# Patient Record
Sex: FEMALE | Race: WHITE | NOT HISPANIC OR LATINO | Employment: UNEMPLOYED | URBAN - METROPOLITAN AREA
[De-identification: names, ages, dates, MRNs, and addresses within clinical notes are randomized per-mention and may not be internally consistent; named-entity substitution may affect disease eponyms.]

---

## 2017-05-18 ENCOUNTER — TRANSFERRED RECORDS (OUTPATIENT)
Dept: HEALTH INFORMATION MANAGEMENT | Facility: CLINIC | Age: 57
End: 2017-05-18

## 2017-05-18 ENCOUNTER — RECORDS - HEALTHEAST (OUTPATIENT)
Dept: LAB | Facility: CLINIC | Age: 57
End: 2017-05-18

## 2017-05-18 LAB
CHOLEST SERPL-MCNC: 230 MG/DL
FASTING STATUS PATIENT QL REPORTED: ABNORMAL
HDLC SERPL-MCNC: 68 MG/DL
LDLC SERPL CALC-MCNC: 138 MG/DL
TRIGL SERPL-MCNC: 121 MG/DL

## 2017-11-13 ENCOUNTER — TRANSFERRED RECORDS (OUTPATIENT)
Dept: HEALTH INFORMATION MANAGEMENT | Facility: CLINIC | Age: 57
End: 2017-11-13

## 2017-11-13 LAB — PHQ9 SCORE: 6

## 2017-11-17 ASSESSMENT — MIFFLIN-ST. JEOR: SCORE: 1126.07

## 2017-11-18 ENCOUNTER — ANESTHESIA - HEALTHEAST (OUTPATIENT)
Dept: SURGERY | Facility: HOSPITAL | Age: 57
End: 2017-11-18

## 2017-11-20 ENCOUNTER — SURGERY - HEALTHEAST (OUTPATIENT)
Dept: SURGERY | Facility: HOSPITAL | Age: 57
End: 2017-11-20

## 2018-02-01 ENCOUNTER — TRANSFERRED RECORDS (OUTPATIENT)
Dept: HEALTH INFORMATION MANAGEMENT | Facility: CLINIC | Age: 58
End: 2018-02-01

## 2018-02-20 ENCOUNTER — TRANSFERRED RECORDS (OUTPATIENT)
Dept: HEALTH INFORMATION MANAGEMENT | Facility: CLINIC | Age: 58
End: 2018-02-20

## 2018-03-20 LAB — PHQ9 SCORE: 2

## 2018-06-15 ENCOUNTER — TRANSFERRED RECORDS (OUTPATIENT)
Dept: HEALTH INFORMATION MANAGEMENT | Facility: CLINIC | Age: 58
End: 2018-06-15

## 2018-06-15 LAB — PHQ9 SCORE: 0

## 2018-08-03 ENCOUNTER — TELEPHONE (OUTPATIENT)
Dept: INTERNAL MEDICINE | Facility: CLINIC | Age: 58
End: 2018-08-03

## 2018-08-03 ENCOUNTER — MEDICAL CORRESPONDENCE (OUTPATIENT)
Dept: HEALTH INFORMATION MANAGEMENT | Facility: CLINIC | Age: 58
End: 2018-08-03

## 2018-08-03 ENCOUNTER — OFFICE VISIT (OUTPATIENT)
Dept: INTERNAL MEDICINE | Facility: CLINIC | Age: 58
End: 2018-08-03
Payer: MEDICARE

## 2018-08-03 VITALS
WEIGHT: 147 LBS | DIASTOLIC BLOOD PRESSURE: 68 MMHG | HEART RATE: 80 BPM | SYSTOLIC BLOOD PRESSURE: 138 MMHG | TEMPERATURE: 98.2 F | BODY MASS INDEX: 28.86 KG/M2 | HEIGHT: 60 IN

## 2018-08-03 DIAGNOSIS — Z00.00 ENCOUNTER FOR ROUTINE ADULT HEALTH EXAMINATION WITHOUT ABNORMAL FINDINGS: Primary | ICD-10-CM

## 2018-08-03 DIAGNOSIS — Z12.31 VISIT FOR SCREENING MAMMOGRAM: ICD-10-CM

## 2018-08-03 DIAGNOSIS — F32.5 MAJOR DEPRESSION IN COMPLETE REMISSION (H): ICD-10-CM

## 2018-08-03 DIAGNOSIS — M15.0 PRIMARY OSTEOARTHRITIS INVOLVING MULTIPLE JOINTS: ICD-10-CM

## 2018-08-03 DIAGNOSIS — J30.2 CHRONIC SEASONAL ALLERGIC RHINITIS, UNSPECIFIED TRIGGER: ICD-10-CM

## 2018-08-03 DIAGNOSIS — H61.23 BILATERAL IMPACTED CERUMEN: ICD-10-CM

## 2018-08-03 DIAGNOSIS — F03.A0 MILD DEMENTIA (H): ICD-10-CM

## 2018-08-03 DIAGNOSIS — K59.09 OTHER CONSTIPATION: Primary | ICD-10-CM

## 2018-08-03 DIAGNOSIS — I10 BENIGN ESSENTIAL HYPERTENSION: ICD-10-CM

## 2018-08-03 PROBLEM — H90.3 SENSORY HEARING LOSS, BILATERAL: Status: ACTIVE | Noted: 2018-08-03

## 2018-08-03 PROBLEM — H90.3 SENSORY HEARING LOSS, BILATERAL: Status: RESOLVED | Noted: 2018-08-03 | Resolved: 2018-08-03

## 2018-08-03 LAB
ALBUMIN UR-MCNC: NEGATIVE MG/DL
APPEARANCE UR: CLEAR
BACTERIA #/AREA URNS HPF: ABNORMAL /HPF
BASOPHILS # BLD AUTO: 0.1 10E9/L (ref 0–0.2)
BASOPHILS NFR BLD AUTO: 0.6 %
BILIRUB UR QL STRIP: NEGATIVE
COLOR UR AUTO: YELLOW
DIFFERENTIAL METHOD BLD: ABNORMAL
EOSINOPHIL # BLD AUTO: 0.2 10E9/L (ref 0–0.7)
EOSINOPHIL NFR BLD AUTO: 2.9 %
ERYTHROCYTE [DISTWIDTH] IN BLOOD BY AUTOMATED COUNT: 14 % (ref 10–15)
GLUCOSE UR STRIP-MCNC: NEGATIVE MG/DL
HCT VFR BLD AUTO: 47.5 % (ref 35–47)
HGB BLD-MCNC: 15.3 G/DL (ref 11.7–15.7)
HGB UR QL STRIP: NEGATIVE
KETONES UR STRIP-MCNC: NEGATIVE MG/DL
LEUKOCYTE ESTERASE UR QL STRIP: ABNORMAL
LYMPHOCYTES # BLD AUTO: 2 10E9/L (ref 0.8–5.3)
LYMPHOCYTES NFR BLD AUTO: 25.5 %
MCH RBC QN AUTO: 27.9 PG (ref 26.5–33)
MCHC RBC AUTO-ENTMCNC: 32.2 G/DL (ref 31.5–36.5)
MCV RBC AUTO: 87 FL (ref 78–100)
MONOCYTES # BLD AUTO: 1 10E9/L (ref 0–1.3)
MONOCYTES NFR BLD AUTO: 13.5 %
MUCOUS THREADS #/AREA URNS LPF: PRESENT /LPF
NEUTROPHILS # BLD AUTO: 4.4 10E9/L (ref 1.6–8.3)
NEUTROPHILS NFR BLD AUTO: 57.5 %
NITRATE UR QL: NEGATIVE
NON-SQ EPI CELLS #/AREA URNS LPF: ABNORMAL /LPF
PH UR STRIP: 5 PH (ref 5–7)
PLATELET # BLD AUTO: 301 10E9/L (ref 150–450)
RBC # BLD AUTO: 5.49 10E12/L (ref 3.8–5.2)
RBC #/AREA URNS AUTO: ABNORMAL /HPF
SOURCE: ABNORMAL
SP GR UR STRIP: 1.02 (ref 1–1.03)
UROBILINOGEN UR STRIP-ACNC: 0.2 EU/DL (ref 0.2–1)
WBC # BLD AUTO: 7.7 10E9/L (ref 4–11)
WBC #/AREA URNS AUTO: ABNORMAL /HPF

## 2018-08-03 PROCEDURE — 36415 COLL VENOUS BLD VENIPUNCTURE: CPT | Performed by: INTERNAL MEDICINE

## 2018-08-03 PROCEDURE — 85025 COMPLETE CBC W/AUTO DIFF WBC: CPT | Performed by: INTERNAL MEDICINE

## 2018-08-03 PROCEDURE — 81001 URINALYSIS AUTO W/SCOPE: CPT | Performed by: INTERNAL MEDICINE

## 2018-08-03 PROCEDURE — 84443 ASSAY THYROID STIM HORMONE: CPT | Performed by: INTERNAL MEDICINE

## 2018-08-03 PROCEDURE — 80053 COMPREHEN METABOLIC PANEL: CPT | Performed by: INTERNAL MEDICINE

## 2018-08-03 PROCEDURE — 80061 LIPID PANEL: CPT | Performed by: INTERNAL MEDICINE

## 2018-08-03 PROCEDURE — G0439 PPPS, SUBSEQ VISIT: HCPCS | Performed by: INTERNAL MEDICINE

## 2018-08-03 RX ORDER — ATOMOXETINE 18 MG/1
CAPSULE ORAL
Status: CANCELLED | COMMUNITY
Start: 2018-08-03

## 2018-08-03 RX ORDER — CITALOPRAM HYDROBROMIDE 40 MG/1
40 TABLET ORAL DAILY
Qty: 90 TABLET | Refills: 1 | Status: SHIPPED | OUTPATIENT
Start: 2018-08-03

## 2018-08-03 RX ORDER — OMEGA-3 FATTY ACIDS/FISH OIL 300-1000MG
CAPSULE ORAL
Qty: 120 CAPSULE | Refills: 4 | Status: SHIPPED | OUTPATIENT
Start: 2018-08-03 | End: 2020-04-30

## 2018-08-03 RX ORDER — DONEPEZIL HYDROCHLORIDE 10 MG/1
10 TABLET, FILM COATED ORAL AT BEDTIME
Qty: 90 TABLET | Refills: 4 | Status: SHIPPED | OUTPATIENT
Start: 2018-08-03

## 2018-08-03 RX ORDER — CHOLECALCIFEROL (VITAMIN D3) 50 MCG
2000 TABLET ORAL DAILY
Qty: 100 TABLET | Refills: 4 | Status: SHIPPED | OUTPATIENT
Start: 2018-08-03 | End: 2021-04-29

## 2018-08-03 RX ORDER — LANOLIN ALCOHOL/MO/W.PET/CERES
CREAM (GRAM) TOPICAL
Status: CANCELLED | COMMUNITY
Start: 2018-08-03

## 2018-08-03 RX ORDER — MULTIPLE VITAMINS W/ MINERALS TAB 9MG-400MCG
1 TAB ORAL DAILY
Qty: 100 TABLET | Refills: 4 | Status: SHIPPED | OUTPATIENT
Start: 2018-08-03

## 2018-08-03 RX ORDER — FERROUS SULFATE 325(65) MG
325 TABLET ORAL
Qty: 90 TABLET | Refills: 4 | Status: SHIPPED | OUTPATIENT
Start: 2018-08-03

## 2018-08-03 RX ORDER — CETIRIZINE HYDROCHLORIDE 10 MG/1
10 TABLET ORAL DAILY
Qty: 90 TABLET | Refills: 4 | Status: SHIPPED | OUTPATIENT
Start: 2018-08-03 | End: 2021-01-28

## 2018-08-03 NOTE — TELEPHONE ENCOUNTER
Pt saw Dr. Dunlap in clinic today. Left msg for group home nurse to call back. Please ask which Metamucil pt takes, exactly, so Dr Can do a Rx. Please fax Rx to [facility] as listed in chart. Also, notify group home staff that pt's printed rxs were faxed to [facility] and copies were mailed to home along with her other form from today's office visit.   JOSE Goel

## 2018-08-03 NOTE — TELEPHONE ENCOUNTER
Katherin Vila with patient's group home calls. Patient informed her she received a message from our office, but couldn't understand what we wanted and asked her to call our office. Katherin is listed on consent to communicate. Informed er     Metamucil powder multi-benefit sugar free (using generic brand) - patient takes once daily.    Katherin also provides the following information given to her by their staff for Mammogram and Colonoscopy.    Please abstract the following data from this visit with this patient into the appropriate field in Epic:    Colonoscopy done on this date: 10/1/2017 (approximately), by this group: unknown, results were had a few polyps removed.   Mammogram done on this date: 2/1/2018 (approximately), by this group: ramin, results were normal.

## 2018-08-03 NOTE — LETTER
[facility]  [address], [address]  [place] [address]  [phone]        August 14, 2018    Valentine Vu  [address] [place] [address]            Dear Ms. Valentine Vu:      The cholesterol level LDL is at goal.   The kidney function (GFR) and liver function tests (AST and ALT) are within normal limits.     The fasting glucose is within normal limits.   The thyroid test (TSH) is within normal limits.   The blood counts (CBC) are within normal limits.  If you have any further questions or problems, please contact our office.    Sincerely,        Sabrina Dunlap M.D.

## 2018-08-03 NOTE — PROGRESS NOTES
Patient present with caretaker today.     SUBJECTIVE:   CC: Valentine Vu is an 57 year old woman who presents for preventive health visit.     Physical   Annual:     Getting at least 3 servings of Calcium per day:  Yes    Bi-annual eye exam:  Yes    Dental care twice a year:  Yes    Sleep apnea or symptoms of sleep apnea:  None    Diet:  Regular (no restrictions)    Frequency of exercise:  6-7 days/week    Duration of exercise:  Greater than 60 minutes    Taking medications regularly:  Yes    Medication side effects:  Not applicable and None        PROBLEMS TO ADD ON...    Today's PHQ-2 Score:   PHQ-2 ( 1999 [employer]) 8/3/2018   Q1: Little interest or pleasure in doing things 0   Q2: Feeling down, depressed or hopeless 0   PHQ-2 Score 0   Q1: Little interest or pleasure in doing things Not at all   Q2: Feeling down, depressed or hopeless Not at all   PHQ-2 Score 0       Abuse: Current or Past(Physical, Sexual or Emotional)- No  Do you feel safe in your environment - Yes    Social History   Substance Use Topics     Smoking status: Never Smoker     Smokeless tobacco: Never Used     Alcohol use No     Alcohol Use 8/3/2018   If you drink alcohol do you typically have greater than 3 drinks per day OR greater than 7 drinks per week? Not Applicable     Reviewed orders with patient.  Reviewed health maintenance and updated orders accordingly - Yes  Labs reviewed in [facility]    Patient over age 50, mutual decision to screen reflected in health maintenance.    Pertinent mammograms are reviewed under the imaging tab.  History of abnormal Pap smear: NO - age 30- 65 PAP every 3 years recommended     Reviewed and updated as needed this visit by clinical staff  Tobacco  Allergies  Soc Hx        Reviewed and updated as needed this visit by Provider            Review of Systems  CONSTITUTIONAL: NEGATIVE for fever, chills, change in weight  INTEGUMENTARY/SKIN: NEGATIVE for worrisome rashes, moles or lesions  EYES: NEGATIVE for vision  "changes or irritation  ENT: NEGATIVE for ear, mouth and throat problems  RESP: NEGATIVE for significant cough or SOB  BREAST: NEGATIVE for masses, tenderness or discharge  CV: NEGATIVE for chest pain, palpitations or peripheral edema  GI: NEGATIVE for nausea, abdominal pain, heartburn, or change in bowel habits  : NEGATIVE for unusual urinary or vaginal symptoms. No vaginal bleeding.  MUSCULOSKELETAL: NEGATIVE for significant arthralgias or myalgia  NEURO: NEGATIVE for weakness, dizziness or paresthesias  PSYCHIATRIC: NEGATIVE for changes in mood or affect      OBJECTIVE:   /68 (BP Location: Left arm, Patient Position: Chair, Cuff Size: Adult Large)  Pulse 80  Temp 98.2  F (36.8  C) (Oral)  Ht 5' 0.24\" (1.53 m)  Wt 147 lb (66.7 kg)  BMI 28.48 kg/m2   /68 (BP Location: Left arm, Patient Position: Chair, Cuff Size: Adult Large)  Pulse 80  Temp 98.2  F (36.8  C) (Oral)  Ht 5' 0.24\" (1.53 m)  Wt 147 lb (66.7 kg)  BMI 28.48 kg/m2    Physical Exam  GENERAL APPEARANCE: healthy, alert and no distress  EYES: Eyes grossly normal to inspection, PERRL and conjunctivae and sclerae normal  HENT: ear canals and TM's normal, nose and mouth without ulcers or lesions, oropharynx clear and oral mucous membranes moist  NECK: no adenopathy, no asymmetry, masses, or scars and thyroid normal to palpation  RESP: lungs clear to auscultation - no rales, rhonchi or wheezes  BREAST: normal without masses, tenderness or nipple discharge and no palpable axillary masses or adenopathy  CV: regular rate and rhythm, normal S1 S2, no S3 or S4, no murmur, click or rub, no peripheral edema and peripheral pulses strong  ABDOMEN: soft, nontender, no hepatosplenomegaly, no masses and bowel sounds normal  MS: no musculoskeletal defects are noted and gait is age appropriate without ataxia  SKIN: no suspicious lesions or rashes  NEURO: Normal strength and tone, sensory exam grossly normal, mentation intact and speech normal  PSYCH: " "mentation appears normal and affect normal/bright      ASSESSMENT/PLAN:       ICD-10-CM    1. Encounter for routine adult health examination without abnormal findings Z00.00 Lipid panel reflex to direct LDL Fasting     Comprehensive metabolic panel     CBC with platelets differential     TSH with free T4 reflex     UA with Microscopic reflex to Culture   2. Bilateral impacted cerumen H61.23 carbamide peroxide (DEBROX) 6.5 % otic solution   3. Benign essential hypertension I10 ferrous sulfate (IRON) 325 (65 Fe) MG tablet   4. Mild dementia F03.90 donepezil (ARICEPT) 10 MG tablet   5. Chronic seasonal allergic rhinitis, unspecified trigger J30.2 cetirizine (ZYRTEC) 10 MG tablet   6. Major depression in complete remission (H) F32.5 citalopram (CELEXA) 40 MG tablet   7. Primary osteoarthritis involving multiple joints M15.0 multivitamin, therapeutic with minerals (MULTI-VITAMIN) TABS tablet     Cholecalciferol (VITAMIN D) 2000 units tablet     ibuprofen 200 MG capsule   8. Visit for screening mammogram Z12.31 *MA Screening Digital Bilateral   obtain records for colonoscopy and pap smear, which she reports is uptodate    COUNSELING:  Reviewed preventive health counseling, as reflected in patient instructions    BP Readings from Last 1 Encounters:   08/03/18 138/68     Estimated body mass index is 28.48 kg/(m^2) as calculated from the following:    Height as of this encounter: 5' 0.24\" (1.53 m).    Weight as of this encounter: 147 lb (66.7 kg).           reports that she has never smoked. She has never used smokeless tobacco.      Counseling Resources:  ATP IV Guidelines  Pooled Cohorts Equation Calculator  Breast Cancer Risk Calculator  FRAX Risk Assessment  ICSI Preventive Guidelines  Dietary Guidelines for Americans, 2010  [employer]'s MyPlate  ASA Prophylaxis  Lung CA Screening    Sabrina Dunlap MD  [facility] for HPI/ROS submitted by the patient on 8/3/2018   PHQ-2 Score: 0    "

## 2018-08-03 NOTE — MR AVS SNAPSHOT
"              After Visit Summary   8/3/2018    Valentine Vu    MRN: [id number]           Patient Information     Date Of Birth          1960        Visit Information        Provider Department      8/3/2018 8:00 AM Sabrina Dunlap MD [facility]        Today's Diagnoses     Encounter for routine adult health examination without abnormal findings    -  1    Bilateral impacted cerumen        Benign essential hypertension        Mild dementia        Chronic seasonal allergic rhinitis, unspecified trigger        Major depression in complete remission (H)        Primary osteoarthritis involving multiple joints        Visit for screening mammogram           Follow-ups after your visit        Future tests that were ordered for you today     Open Future Orders        Priority Expected Expires Ordered    *MA Screening Digital Bilateral Routine  8/3/2019 8/3/2018            Who to contact     If you have questions or need follow up information about today's clinic visit or your schedule please contact [facility] directly at [phone].  Normal or non-critical lab and imaging results will be communicated to you by MyChart, letter or phone within 4 business days after the clinic has received the results. If you do not hear from us within 7 days, please contact the clinic through MyChart or phone. If you have a critical or abnormal lab result, we will notify you by phone as soon as possible.  Submit refill requests through [facility] or call your pharmacy and they will forward the refill request to us. Please allow 3 business days for your refill to be completed.          Additional Information About Your Visit        MyChart Information     LYYN lets you send messages to your doctor, view your test results, renew your prescriptions, schedule appointments and more. To sign up, go to [email or web address] . Click on \"Log in\" on the left side of the screen, which will take you to " "the Welcome page. Then click on \"Sign up Now\" on the right side of the page.     You will be asked to enter the access code listed below, as well as some personal information. Please follow the directions to create your username and password.     Your access code is: [email or web address]  Expires: 2018  7:54 AM     Your access code will  in 90 days. If you need help or a new code, please call your [place] clinic or [phone].        Care EveryWhere ID     This is your Care EveryWhere ID. This could be used by other organizations to access your [place] medical records  [id number]        Your Vitals Were     Pulse Temperature Height BMI (Body Mass Index)          80 98.2  F (36.8  C) (Oral) 5' 0.24\" (1.53 m) 28.48 kg/m2         Blood Pressure from Last 3 Encounters:   18 138/68    Weight from Last 3 Encounters:   18 147 lb (66.7 kg)              We Performed the Following     CBC with platelets differential     Comprehensive metabolic panel     Lipid panel reflex to direct LDL Fasting     TSH with free T4 reflex     UA with Microscopic reflex to Culture          Today's Medication Changes          These changes are accurate as of 8/3/18 12:47 PM.  If you have any questions, ask your nurse or doctor.               Start taking these medicines.        Dose/Directions    carbamide peroxide 6.5 % otic solution   Commonly known as:  DEBROX   Used for:  Bilateral impacted cerumen   Started by:  Sabrina Dunlap MD        Use twice daily for 4 days prior to appt for ear cleaning.   Quantity:  15 mL   Refills:  4       cetirizine 10 MG tablet   Commonly known as:  zyrTEC   Used for:  Chronic seasonal allergic rhinitis, unspecified trigger   Started by:  Sabrina Dunlap MD        Dose:  10 mg   Take 1 tablet (10 mg) by mouth daily   Quantity:  90 tablet   Refills:  4       citalopram 40 MG tablet   Commonly known as:  celeXA   Used for:  Major depression in complete remission (H)   Started by:  " Sabrina Dunlap MD        Dose:  40 mg   Take 1 tablet (40 mg) by mouth daily   Quantity:  90 tablet   Refills:  1       donepezil 10 MG tablet   Commonly known as:  ARICEPT   Used for:  Mild dementia   Started by:  Sabrina Dunlap MD        Dose:  10 mg   Take 1 tablet (10 mg) by mouth At Bedtime   Quantity:  90 tablet   Refills:  4       ferrous sulfate 325 (65 Fe) MG tablet   Commonly known as:  IRON   Used for:  Benign essential hypertension   Started by:  Sabrina Dunlap MD        Dose:  325 mg   Take 1 tablet (325 mg) by mouth daily (with breakfast)   Quantity:  90 tablet   Refills:  4       ibuprofen 200 MG capsule   Used for:  Primary osteoarthritis involving multiple joints   Started by:  Sabrina Dunlap MD        One tablet every 4 to 6 hours as needed   Quantity:  120 capsule   Refills:  4       multivitamin, therapeutic with minerals Tabs tablet   Used for:  Primary osteoarthritis involving multiple joints   Started by:  Sabrina Dunlap MD        Dose:  1 tablet   Take 1 tablet by mouth daily   Quantity:  100 tablet   Refills:  4       vitamin D 2000 units tablet   Used for:  Primary osteoarthritis involving multiple joints   Started by:  Sabrina Dunlap MD        Dose:  2000 Units   Take 2,000 Units by mouth daily   Quantity:  100 tablet   Refills:  4            Where to get your medicines      Some of these will need a paper prescription and others can be bought over the counter.  Ask your nurse if you have questions.     Bring a paper prescription for each of these medications     carbamide peroxide 6.5 % otic solution    cetirizine 10 MG tablet    citalopram 40 MG tablet    donepezil 10 MG tablet    ferrous sulfate 325 (65 Fe) MG tablet    ibuprofen 200 MG capsule    multivitamin, therapeutic with minerals Tabs tablet    vitamin D 2000 units tablet                Primary Care Provider Fax #    Physician No Ref-Primary [phone]       No address on file        Equal  Access to Services     [facility]: Hadii aad ku hadterepoonam Rgali, wavasquezda luqadaha, qaybta kaalmablair murguia. So [facility] [phone].    ATENCIÓN: Si habla español, tiene a delatorre disposición servicios gratuitos de asistencia lingüística. Llame al [phone].    We comply with applicable federal civil rights laws and [place] laws. We do not discriminate on the basis of race, color, national origin, age, disability, sex, sexual orientation, or gender identity.            Thank you!     Thank you for choosing [facility]  for your care. Our goal is always to provide you with excellent care. Hearing back from our patients is one way we can continue to improve our services. Please take a few minutes to complete the written survey that you may receive in the mail after your visit with us. Thank you!             Your Updated Medication List - Protect others around you: Learn how to safely use, store and throw away your medicines at www.disposemymeds.org.          This list is accurate as of 8/3/18 12:47 PM.  Always use your most recent med list.                   Brand Name Dispense Instructions for use Diagnosis    carbamide peroxide 6.5 % otic solution    DEBROX    15 mL    Use twice daily for 4 days prior to appt for ear cleaning.    Bilateral impacted cerumen       cetirizine 10 MG tablet    zyrTEC    90 tablet    Take 1 tablet (10 mg) by mouth daily    Chronic seasonal allergic rhinitis, unspecified trigger       citalopram 40 MG tablet    celeXA    90 tablet    Take 1 tablet (40 mg) by mouth daily    Major depression in complete remission (H)       donepezil 10 MG tablet    ARICEPT    90 tablet    Take 1 tablet (10 mg) by mouth At Bedtime    Mild dementia       ferrous sulfate 325 (65 Fe) MG tablet    IRON    90 tablet    Take 1 tablet (325 mg) by mouth daily (with breakfast)    Benign essential hypertension       ibuprofen 200 MG capsule     120 capsule     One tablet every 4 to 6 hours as needed    Primary osteoarthritis involving multiple joints       multivitamin, therapeutic with minerals Tabs tablet     100 tablet    Take 1 tablet by mouth daily    Primary osteoarthritis involving multiple joints       vitamin D 2000 units tablet     100 tablet    Take 2,000 Units by mouth daily    Primary osteoarthritis involving multiple joints

## 2018-08-04 LAB
ALBUMIN SERPL-MCNC: 3.9 G/DL (ref 3.4–5)
ALP SERPL-CCNC: 69 U/L (ref 40–150)
ALT SERPL W P-5'-P-CCNC: 27 U/L (ref 0–50)
ANION GAP SERPL CALCULATED.3IONS-SCNC: 5 MMOL/L (ref 3–14)
AST SERPL W P-5'-P-CCNC: 14 U/L (ref 0–45)
BILIRUB SERPL-MCNC: 0.4 MG/DL (ref 0.2–1.3)
BUN SERPL-MCNC: 15 MG/DL (ref 7–30)
CALCIUM SERPL-MCNC: 8.9 MG/DL (ref 8.5–10.1)
CHLORIDE SERPL-SCNC: 108 MMOL/L (ref 94–109)
CHOLEST SERPL-MCNC: 157 MG/DL
CO2 SERPL-SCNC: 29 MMOL/L (ref 20–32)
CREAT SERPL-MCNC: 0.55 MG/DL (ref 0.52–1.04)
GFR SERPL CREATININE-BSD FRML MDRD: >90 ML/MIN/1.7M2
GLUCOSE SERPL-MCNC: 88 MG/DL (ref 70–99)
HDLC SERPL-MCNC: 62 MG/DL
LDLC SERPL CALC-MCNC: 79 MG/DL
NONHDLC SERPL-MCNC: 95 MG/DL
POTASSIUM SERPL-SCNC: 5.3 MMOL/L (ref 3.4–5.3)
PROT SERPL-MCNC: 6.9 G/DL (ref 6.8–8.8)
SODIUM SERPL-SCNC: 142 MMOL/L (ref 133–144)
TRIGL SERPL-MCNC: 79 MG/DL
TSH SERPL DL<=0.005 MIU/L-ACNC: 0.74 MU/L (ref 0.4–4)

## 2018-08-04 ASSESSMENT — PATIENT HEALTH QUESTIONNAIRE - PHQ9: SUM OF ALL RESPONSES TO PHQ QUESTIONS 1-9: 1

## 2018-08-07 NOTE — TELEPHONE ENCOUNTER
Left V/M for pt/group home nurse below and asked her to call back to ask what dosage of Metamucil she uses.

## 2018-08-11 ENCOUNTER — TELEPHONE (OUTPATIENT)
Dept: INTERNAL MEDICINE | Facility: CLINIC | Age: 58
End: 2018-08-11

## 2018-08-12 NOTE — TELEPHONE ENCOUNTER
Please let the patient know that the urine is borderline for an infection.    Please check to see if she is experiencing any symptoms currently.    thanks

## 2018-08-14 NOTE — TELEPHONE ENCOUNTER
Call back from care giver. Updated. Patient reports she has not had any symptoms. Advised to continue to monitor and call if any concerns.

## 2018-09-28 ENCOUNTER — APPOINTMENT (OUTPATIENT)
Dept: MRI IMAGING | Facility: CLINIC | Age: 58
End: 2018-09-28
Attending: EMERGENCY MEDICINE
Payer: MEDICARE

## 2018-09-28 ENCOUNTER — HOSPITAL ENCOUNTER (OUTPATIENT)
Facility: CLINIC | Age: 58
Setting detail: OBSERVATION
Discharge: NURSING, REHAB OR LONG-TERM CARE | End: 2018-09-29
Attending: EMERGENCY MEDICINE | Admitting: INTERNAL MEDICINE
Payer: MEDICARE

## 2018-09-28 DIAGNOSIS — R55 SYNCOPE, UNSPECIFIED SYNCOPE TYPE: ICD-10-CM

## 2018-09-28 DIAGNOSIS — R40.4 TRANSIENT ALTERATION OF AWARENESS: ICD-10-CM

## 2018-09-28 DIAGNOSIS — N39.0 URINARY TRACT INFECTION WITHOUT HEMATURIA, SITE UNSPECIFIED: Primary | ICD-10-CM

## 2018-09-28 LAB
ALBUMIN UR-MCNC: NEGATIVE MG/DL
ANION GAP SERPL CALCULATED.3IONS-SCNC: 3 MMOL/L (ref 3–14)
APPEARANCE UR: ABNORMAL
BASOPHILS # BLD AUTO: 0.1 10E9/L (ref 0–0.2)
BASOPHILS NFR BLD AUTO: 0.8 %
BILIRUB UR QL STRIP: NEGATIVE
BUN SERPL-MCNC: 17 MG/DL (ref 7–30)
CALCIUM SERPL-MCNC: 8.5 MG/DL (ref 8.5–10.1)
CHLORIDE SERPL-SCNC: 107 MMOL/L (ref 94–109)
CO2 SERPL-SCNC: 30 MMOL/L (ref 20–32)
COLOR UR AUTO: YELLOW
CREAT SERPL-MCNC: 0.7 MG/DL (ref 0.52–1.04)
DIFFERENTIAL METHOD BLD: ABNORMAL
EOSINOPHIL # BLD AUTO: 0.4 10E9/L (ref 0–0.7)
EOSINOPHIL NFR BLD AUTO: 5.6 %
ERYTHROCYTE [DISTWIDTH] IN BLOOD BY AUTOMATED COUNT: 14.1 % (ref 10–15)
GFR SERPL CREATININE-BSD FRML MDRD: 86 ML/MIN/1.7M2
GLUCOSE BLDC GLUCOMTR-MCNC: 111 MG/DL (ref 70–99)
GLUCOSE SERPL-MCNC: 103 MG/DL (ref 70–99)
GLUCOSE UR STRIP-MCNC: NEGATIVE MG/DL
HCT VFR BLD AUTO: 46.1 % (ref 35–47)
HGB BLD-MCNC: 15.1 G/DL (ref 11.7–15.7)
HGB UR QL STRIP: NEGATIVE
IMM GRANULOCYTES # BLD: 0 10E9/L (ref 0–0.4)
IMM GRANULOCYTES NFR BLD: 0.3 %
KETONES UR STRIP-MCNC: NEGATIVE MG/DL
LEUKOCYTE ESTERASE UR QL STRIP: ABNORMAL
LYMPHOCYTES # BLD AUTO: 1.9 10E9/L (ref 0.8–5.3)
LYMPHOCYTES NFR BLD AUTO: 26.2 %
MCH RBC QN AUTO: 28.3 PG (ref 26.5–33)
MCHC RBC AUTO-ENTMCNC: 32.8 G/DL (ref 31.5–36.5)
MCV RBC AUTO: 86 FL (ref 78–100)
MONOCYTES # BLD AUTO: 0.8 10E9/L (ref 0–1.3)
MONOCYTES NFR BLD AUTO: 11 %
MUCOUS THREADS #/AREA URNS LPF: PRESENT /LPF
NEUTROPHILS # BLD AUTO: 4 10E9/L (ref 1.6–8.3)
NEUTROPHILS NFR BLD AUTO: 56.1 %
NITRATE UR QL: NEGATIVE
NRBC # BLD AUTO: 0 10*3/UL
NRBC BLD AUTO-RTO: 0 /100
PH UR STRIP: 5 PH (ref 5–7)
PLATELET # BLD AUTO: 340 10E9/L (ref 150–450)
POTASSIUM SERPL-SCNC: 4 MMOL/L (ref 3.4–5.3)
RBC # BLD AUTO: 5.34 10E12/L (ref 3.8–5.2)
RBC #/AREA URNS AUTO: 5 /HPF (ref 0–2)
SODIUM SERPL-SCNC: 140 MMOL/L (ref 133–144)
SOURCE: ABNORMAL
SP GR UR STRIP: 1.03 (ref 1–1.03)
SQUAMOUS #/AREA URNS AUTO: 3 /HPF (ref 0–1)
TROPONIN I SERPL-MCNC: <0.015 UG/L (ref 0–0.04)
TSH SERPL DL<=0.005 MIU/L-ACNC: 2.2 MU/L (ref 0.4–4)
UROBILINOGEN UR STRIP-MCNC: 0 MG/DL (ref 0–2)
WBC # BLD AUTO: 7.2 10E9/L (ref 4–11)
WBC #/AREA URNS AUTO: 73 /HPF (ref 0–5)

## 2018-09-28 PROCEDURE — 81001 URINALYSIS AUTO W/SCOPE: CPT | Performed by: EMERGENCY MEDICINE

## 2018-09-28 PROCEDURE — 25000128 H RX IP 250 OP 636: Performed by: EMERGENCY MEDICINE

## 2018-09-28 PROCEDURE — 96374 THER/PROPH/DIAG INJ IV PUSH: CPT | Mod: 59

## 2018-09-28 PROCEDURE — 70544 MR ANGIOGRAPHY HEAD W/O DYE: CPT | Mod: XS

## 2018-09-28 PROCEDURE — 87086 URINE CULTURE/COLONY COUNT: CPT | Performed by: EMERGENCY MEDICINE

## 2018-09-28 PROCEDURE — 70553 MRI BRAIN STEM W/O & W/DYE: CPT

## 2018-09-28 PROCEDURE — 99285 EMERGENCY DEPT VISIT HI MDM: CPT | Mod: 25

## 2018-09-28 PROCEDURE — 25500064 ZZH RX 255 OP 636: Performed by: EMERGENCY MEDICINE

## 2018-09-28 PROCEDURE — 00000146 ZZHCL STATISTIC GLUCOSE BY METER IP

## 2018-09-28 PROCEDURE — 84443 ASSAY THYROID STIM HORMONE: CPT | Performed by: EMERGENCY MEDICINE

## 2018-09-28 PROCEDURE — 80048 BASIC METABOLIC PNL TOTAL CA: CPT | Performed by: EMERGENCY MEDICINE

## 2018-09-28 PROCEDURE — 85025 COMPLETE CBC W/AUTO DIFF WBC: CPT | Performed by: EMERGENCY MEDICINE

## 2018-09-28 PROCEDURE — A9585 GADOBUTROL INJECTION: HCPCS | Performed by: EMERGENCY MEDICINE

## 2018-09-28 PROCEDURE — 93005 ELECTROCARDIOGRAM TRACING: CPT

## 2018-09-28 PROCEDURE — 70549 MR ANGIOGRAPH NECK W/O&W/DYE: CPT

## 2018-09-28 PROCEDURE — 84484 ASSAY OF TROPONIN QUANT: CPT | Performed by: EMERGENCY MEDICINE

## 2018-09-28 RX ORDER — CEFTRIAXONE 1 G/1
1 INJECTION, POWDER, FOR SOLUTION INTRAMUSCULAR; INTRAVENOUS ONCE
Status: COMPLETED | OUTPATIENT
Start: 2018-09-28 | End: 2018-09-29

## 2018-09-28 RX ORDER — GADOBUTROL 604.72 MG/ML
10 INJECTION INTRAVENOUS ONCE
Status: COMPLETED | OUTPATIENT
Start: 2018-09-28 | End: 2018-09-28

## 2018-09-28 RX ADMIN — CEFTRIAXONE SODIUM 1 G: 1 INJECTION, POWDER, FOR SOLUTION INTRAMUSCULAR; INTRAVENOUS at 23:56

## 2018-09-28 RX ADMIN — GADOBUTROL 10 ML: 604.72 INJECTION INTRAVENOUS at 22:12

## 2018-09-28 ASSESSMENT — ENCOUNTER SYMPTOMS
WEAKNESS: 0
LIGHT-HEADEDNESS: 0
DIAPHORESIS: 1
SHORTNESS OF BREATH: 0
SPEECH DIFFICULTY: 1

## 2018-09-28 NOTE — IP AVS SNAPSHOT
` ` Patient Information     Patient Name Sex Valentine Justin ([id number]) Female 1960       Room Bed    225      Patient Demographics     Address Phone E-mail Address    [address] [place] [address] [phone] (Home)  [phone] (Mobile) [email or web address].      Patient Ethnicity & Race     Ethnic Group Patient Race    American White      Emergency Contact(s)     Name Relation Home Work Mobile    [place]iana Valencia [phone]    JACKIE ABARCA [phone]       Documents on File        Status Date Received Description       Documents for the Patient    Affiliate Privacy placeholder   phase3    Consent for EHR Access Received 18     Insurance Card Received 18 Medicare    Patient ID Received 18 [place] ID 2020    [facility] Specified Other       Privacy Notice - [place] Received 18     Consent for Services - Hospital and Clinic Received 18     HIE Auth Received 18     Consent for Services - Hospital and Clinic       Consent to Communicate Received 18     HIM JOSE Authorization - File Only  18 DR THORNE    HIM JOSE Authorization - File Only  18 [facility]    HIM JOSE Authorization - File Only  18 [facility]    Immunization Record  18 IMMUNIZATION RECORD ENTIRA    HIM JOSE Authorization - File Only  18 DR. THORNE       Documents for the Encounter    CMS IM for Patient Signature       Observation Notice Received 18 Obs ltr/video given per TEDDY Escobar RN in ED      Admission Information     Attending Provider Admitting Provider Admission Type Admission Date/Time    Darryl Zayas MD Eklund, Derek William, MD Emergency 18    Discharge Date Hospital Service Auth/Cert Status Service Area     Observation Incomplete [facility]    Unit Room/Bed Admission Status        OBSERVATION DEPT  Admission (Confirmed)       Admission      Complaint    Syncope      Hospital Account     Name Acct ID Class Status Primary Coverage    Valentine Vu [id number] Observation Open MEDICARE - MEDICARE            Guarantor Account (for Hospital Account #[id number])     Name Relation to Pt Service Area Active? Acct Type    Valentine Vu Self FCS Yes Personal/Family    Address Phone          [address] [place] [address] [phone](H)              Coverage Information (for Hospital Account #[id number])     F/O Payor/Plan Precert #    MEDICARE/MEDICARE     Subscriber Subscriber #    Valentine Vu [id number]    Address Phone    [employer] CLAIMS  [address]  [facility] [place] [address] [phone]

## 2018-09-28 NOTE — IP AVS SNAPSHOT
MRN:[id number]                      After Visit Summary   9/28/2018    Valentine Vu    MRN: [id number]           Thank you!     Thank you for choosing [facility] for your care. Our goal is always to provide you with excellent care. Hearing back from our patients is one way we can continue to improve our services. Please take a few minutes to complete the written survey that you may receive in the mail after you visit. If you would like to speak to someone directly about your visit please contact Patient Relations at [phone]. Thank you!          Patient Information     Date Of Birth          1960        About your hospital stay     You were admitted on:  September 29, 2018 You last received care in the:  [facility] Observation Department    You were discharged on:  September 29, 2018        Reason for your hospital stay       You were registered to the observation unit for an episode of altered level of consciousness, possible syncope. Your evaluation included labs - BMP, CBC, troponin, TSH which were normal. You urine was abnormal and indicates a possible UTI, urine culture is pending. MRI and MR angiogram of the head and neck was normal. And ECHO was also obtained that was normal. You will be sent home with oral antibiotics to cover the UTI. An outpatient heart monitor has also been ordered and will be placed as an outpatient some time next week. If this cardiac is normal, then Neurology referral may be necessary.                  Who to Call     For medical emergencies, please call 911.  For non-urgent questions about your medical care, please call your primary care provider or clinic, [phone]          Attending Provider     Provider Specialty    Timothy Felix MD Emergency Medicine    Chana, Darryl Pereira MD Internal Medicine       Primary Care Provider Office Phone # Fax #    Sabrina Dunlap [phone]      After Care  "Instructions     Activity       Your activity upon discharge: activity as tolerated            Diet       Follow this diet upon discharge: resume your pre-admission diet                  Follow-up Appointments     Follow-up and recommended labs and tests        1. A cardiac monitor has been ordered, called a Quin, to monitor your heart as an outpatient. Please call [phone] to get this scheduled.   2. You will be sent home with an oral antibiotics for probable UTI. Your urine culture is still pending. Please follow up with your family doctor to discuss these results.   3. Follow up with your family doctor within 1 week to discuss your recent admission and for re-evaluation.                             Future tests that were ordered for you     Zio Patch Holter                 Pending Results     Date and Time Order Name Status Description    9/28/2018 2460 Urine Culture In process             Statement of Approval     Ordered          09/29/18 1324  I have reviewed and agree with all the recommendations and orders detailed in this document.  EFFECTIVE NOW     Approved and electronically signed by:  Lindsay Bellamy PA-C             Admission Information     Date & Time Provider Department Dept. Phone    9/28/2018 Darryl Zayas MD [facility] Observation Department [phone]      Your Vitals Were     Blood Pressure Pulse Temperature Respirations Height Weight    121/65 (BP Location: Right arm) 74 96.7  F (35.9  C) (Oral) 20 1.524 m (5') 65.8 kg (145 lb)    Pulse Oximetry BMI (Body Mass Index)                96% 28.32 kg/m2          MyChart Information     [employer] lets you send messages to your doctor, view your test results, renew your prescriptions, schedule appointments and more. To sign up, go to [email or web address] . Click on \"Log in\" on the left side of the screen, which will take you to the Welcome page. Then click on \"Sign up Now\" on the right side of the page. "     You will be asked to enter the access code listed below, as well as some personal information. Please follow the directions to create your username and password.     Your access code is: [email or web address]  Expires: 2018  7:54 AM     Your access code will  in 90 days. If you need help or a new code, please call your [place] clinic or [phone].        Care EveryWhere ID     This is your Care EveryWhere ID. This could be used by other organizations to access your [place] medical records  [id number]        Equal Access to Services     [facility]: Hadii dk jaquez hadasho Somarcela, waaxda luqadaha, qaybta kaalmada ebony, blair taylor . So [facility] [phone].    ATENCIÓN: Si habla español, tiene a delatorre disposición servicios gratuitos de asistencia lingüística. Llame al [phone].    We comply with applicable federal civil rights laws and [place] laws. We do not discriminate on the basis of race, color, national origin, age, disability, sex, sexual orientation, or gender identity.               Review of your medicines      START taking        Dose / Directions    nitroFURantoin (macrocrystal-monohydrate) 100 MG capsule   Commonly known as:  MACROBID   Used for:  Urinary tract infection without hematuria, site unspecified        Dose:  100 mg   Take 1 capsule (100 mg) by mouth 2 times daily   Quantity:  14 capsule   Refills:  0         CONTINUE these medicines which have NOT CHANGED        Dose / Directions    carbamide peroxide 6.5 % otic solution   Commonly known as:  DEBROX   Used for:  Bilateral impacted cerumen        Use twice daily for 4 days prior to appt for ear cleaning.   Quantity:  15 mL   Refills:  4       cetirizine 10 MG tablet   Commonly known as:  zyrTEC   Used for:  Chronic seasonal allergic rhinitis, unspecified trigger        Dose:  10 mg   Take 1 tablet (10 mg) by mouth daily   Quantity:  90 tablet   Refills:  4       citalopram 40 MG tablet   Commonly  known as:  celeXA   Used for:  Major depression in complete remission (H)        Dose:  40 mg   Take 1 tablet (40 mg) by mouth daily   Quantity:  90 tablet   Refills:  1       donepezil 10 MG tablet   Commonly known as:  ARICEPT   Used for:  Mild dementia        Dose:  10 mg   Take 1 tablet (10 mg) by mouth At Bedtime   Quantity:  90 tablet   Refills:  4       ferrous sulfate 325 (65 Fe) MG tablet   Commonly known as:  IRON   Used for:  Benign essential hypertension        Dose:  325 mg   Take 1 tablet (325 mg) by mouth daily (with breakfast)   Quantity:  90 tablet   Refills:  4       ibuprofen 200 MG capsule   Used for:  Primary osteoarthritis involving multiple joints        One tablet every 4 to 6 hours as needed   Quantity:  120 capsule   Refills:  4       multivitamin, therapeutic with minerals Tabs tablet   Used for:  Primary osteoarthritis involving multiple joints        Dose:  1 tablet   Take 1 tablet by mouth daily   Quantity:  100 tablet   Refills:  4       psyllium 58.6 % Powd   Commonly known as:  METAMUCIL MULTIHEALTH FIBER   Used for:  Other constipation        Dose:  1 Tablespoonful   Take 18 g (1 Tablespoonful) by mouth daily   Quantity:  1 Bottle   Refills:  1       STRATTERA 18 MG capsule   Generic drug:  atomoxetine        Dose:  54 mg   Take 54 mg by mouth every evening   Refills:  0       triamcinolone 0.5 % cream   Commonly known as:  KENALOG        Apply topically 3 times daily as needed for irritation (duration of treatment: 7-14 days PRN red marks on arm)   Refills:  0       vitamin D 2000 units tablet   Used for:  Primary osteoarthritis involving multiple joints        Dose:  2000 Units   Take 2,000 Units by mouth daily   Quantity:  100 tablet   Refills:  4            Where to get your medicines      These medications were sent to [facility] [address] [address] [address]     Phone:  [phone]     Mindy  (macrocrystal-monohydrate) 100 MG capsule                Protect others around you: Learn how to safely use, store and throw away your medicines at www.disposemymeds.org.             Medication List: This is a list of all your medications and when to take them. Check marks below indicate your daily home schedule. Keep this list as a reference.      Medications           Morning Afternoon Evening Bedtime As Needed    carbamide peroxide 6.5 % otic solution   Commonly known as:  DEBROX   Use twice daily for 4 days prior to appt for ear cleaning.                                cetirizine 10 MG tablet   Commonly known as:  zyrTEC   Take 1 tablet (10 mg) by mouth daily                                citalopram 40 MG tablet   Commonly known as:  celeXA   Take 1 tablet (40 mg) by mouth daily                                donepezil 10 MG tablet   Commonly known as:  ARICEPT   Take 1 tablet (10 mg) by mouth At Bedtime                                ferrous sulfate 325 (65 Fe) MG tablet   Commonly known as:  IRON   Take 1 tablet (325 mg) by mouth daily (with breakfast)                                ibuprofen 200 MG capsule   One tablet every 4 to 6 hours as needed                                multivitamin, therapeutic with minerals Tabs tablet   Take 1 tablet by mouth daily                                nitroFURantoin (macrocrystal-monohydrate) 100 MG capsule   Commonly known as:  MACROBID   Take 1 capsule (100 mg) by mouth 2 times daily                                psyllium 58.6 % Powd   Commonly known as:  METAMUCIL MULTIHEALTH FIBER   Take 18 g (1 Tablespoonful) by mouth daily                                STRATTERA 18 MG capsule   Take 54 mg by mouth every evening   Generic drug:  atomoxetine                                triamcinolone 0.5 % cream   Commonly known as:  KENALOG   Apply topically 3 times daily as needed for irritation (duration of treatment: 7-14 days PRN red marks on arm)                                 vitamin D 2000 units tablet   Take 2,000 Units by mouth daily

## 2018-09-28 NOTE — IP AVS SNAPSHOT
[facility] OBSERVATION DEPARTMENT: [phone]                                              INTERAGENCY TRANSFER FORM - PHYSICIAN ORDERS   2018                    Hospital Admission Date: 2018  JOYCE CAMACHO   : 1960  Sex: Female        Attending Provider: Darryl Zayas MD     Allergies:  No Known Allergies    Infection:  None   Service:  Observation    Ht:  1.524 m (5')   Wt:  65.8 kg (145 lb)   Admission Wt:  65 kg (143 lb 4.8 oz)    BMI:  28.32 kg/m 2   BSA:  1.67 m 2            Patient PCP Information     Provider PCP Type    Sabrina Dunlap MD General      ED Clinical Impression     Diagnosis Description Comment Added By Time Added    Transient alteration of awareness [R40.4] Transient alteration of awareness [R40.4]  Timothy Felix MD 2018 11:58 PM      Hospital Problems as of 2018              Priority Class Noted POA    Syncope Medium  2018 Yes      Non-Hospital Problems as of 2018              Priority Class Noted    Major depression in complete remission (H) Medium  8/3/2018    Seasonal allergic rhinitis Medium  8/3/2018    Mild dementia Medium  8/3/2018    Benign essential hypertension Medium  8/3/2018      Code Status History     Date Active Date Inactive Code Status Order ID Comments User Context    2018  1:23 PM  Full Code [id number]  Lindsay Bellamy PA-C Outpatient    2018  1:46 AM 2018  1:23 PM Full Code [id number]  Darryl Zayas MD Inpatient         Medication Review      START taking        Dose / Directions Comments    nitroFURantoin (macrocrystal-monohydrate) 100 MG capsule   Commonly known as:  MACROBID   Used for:  Urinary tract infection without hematuria, site unspecified        Dose:  100 mg   Take 1 capsule (100 mg) by mouth 2 times daily   Quantity:  14 capsule   Refills:  0          CONTINUE these medications which have NOT CHANGED        Dose / Directions Comments    carbamide  peroxide 6.5 % otic solution   Commonly known as:  DEBROX   Used for:  Bilateral impacted cerumen        Use twice daily for 4 days prior to appt for ear cleaning.   Quantity:  15 mL   Refills:  4        cetirizine 10 MG tablet   Commonly known as:  zyrTEC   Used for:  Chronic seasonal allergic rhinitis, unspecified trigger        Dose:  10 mg   Take 1 tablet (10 mg) by mouth daily   Quantity:  90 tablet   Refills:  4        citalopram 40 MG tablet   Commonly known as:  celeXA   Used for:  Major depression in complete remission (H)        Dose:  40 mg   Take 1 tablet (40 mg) by mouth daily   Quantity:  90 tablet   Refills:  1        donepezil 10 MG tablet   Commonly known as:  ARICEPT   Used for:  Mild dementia        Dose:  10 mg   Take 1 tablet (10 mg) by mouth At Bedtime   Quantity:  90 tablet   Refills:  4        ferrous sulfate 325 (65 Fe) MG tablet   Commonly known as:  IRON   Used for:  Benign essential hypertension        Dose:  325 mg   Take 1 tablet (325 mg) by mouth daily (with breakfast)   Quantity:  90 tablet   Refills:  4        ibuprofen 200 MG capsule   Used for:  Primary osteoarthritis involving multiple joints        One tablet every 4 to 6 hours as needed   Quantity:  120 capsule   Refills:  4        multivitamin, therapeutic with minerals Tabs tablet   Used for:  Primary osteoarthritis involving multiple joints        Dose:  1 tablet   Take 1 tablet by mouth daily   Quantity:  100 tablet   Refills:  4        psyllium 58.6 % Powd   Commonly known as:  METAMUCIL MULTIHEALTH FIBER   Used for:  Other constipation        Dose:  1 Tablespoonful   Take 18 g (1 Tablespoonful) by mouth daily   Quantity:  1 Bottle   Refills:  1        STRATTERA 18 MG capsule   Generic drug:  atomoxetine        Dose:  54 mg   Take 54 mg by mouth every evening   Refills:  0        triamcinolone 0.5 % cream   Commonly known as:  KENALOG        Apply topically 3 times daily as needed for irritation (duration of treatment: 7-14  days PRN red marks on arm)   Refills:  0        vitamin D 2000 units tablet   Used for:  Primary osteoarthritis involving multiple joints        Dose:  2000 Units   Take 2,000 Units by mouth daily   Quantity:  100 tablet   Refills:  4                Summary of Visit     Reason for your hospital stay       You were registered to the observation unit for an episode of altered level of consciousness, possible syncope. Your evaluation included labs - BMP, CBC, troponin, TSH which were normal. You urine was abnormal and indicates a possible UTI, urine culture is pending. MRI and MR angiogram of the head and neck was normal. And ECHO was also obtained that was normal. You will be sent home with oral antibiotics to cover the UTI. An outpatient heart monitor has also been ordered and will be placed as an outpatient some time next week. If this cardiac is normal, then Neurology referral may be necessary.             After Care     Activity       Your activity upon discharge: activity as tolerated       Diet       Follow this diet upon discharge: resume your pre-admission diet             Radiology & Cardiology Orders     Future Labs/Procedures Complete By Expires    Zio Patch Holter  As directed 11/13/2018      Radiology & Cardiology Orders     Zio Patch Holter                 Follow-Up Appointment Instructions     Future Labs/Procedures    Follow-up and recommended labs and tests      Comments:    1. A cardiac monitor has been ordered, called a Ziopatch, to monitor your heart as an outpatient. Please call [phone] to get this scheduled.   2. You will be sent home with an oral antibiotics for probable UTI. Your urine culture is still pending. Please follow up with your family doctor to discuss these results.   3. Follow up with your family doctor within 1 week to discuss your recent admission and for re-evaluation.      Follow-Up Appointment Instructions     Follow-up and recommended labs and tests        1. A cardiac  monitor has been ordered, called a Ziopatch, to monitor your heart as an outpatient. Please call [phone] to get this scheduled.   2. You will be sent home with an oral antibiotics for probable UTI. Your urine culture is still pending. Please follow up with your family doctor to discuss these results.   3. Follow up with your family doctor within 1 week to discuss your recent admission and for re-evaluation.             Statement of Approval     Ordered          09/29/18 4054  I have reviewed and agree with all the recommendations and orders detailed in this document.  EFFECTIVE NOW     Approved and electronically signed by:  Lindsay Bellamy PA-C

## 2018-09-28 NOTE — IP AVS SNAPSHOT
[facility] Observation Department    [address]    [facility] [address]    Phone:  [phone]                                       After Visit Summary   9/28/2018    Valentine Vu    MRN: [id number]           After Visit Summary Signature Page     I have received my discharge instructions, and my questions have been answered. I have discussed any challenges I see with this plan with the nurse or doctor.    ..........................................................................................................................................  Patient/Patient Representative Signature      ..........................................................................................................................................  Patient Representative Print Name and Relationship to Patient    ..................................................               ................................................  Date                                   Time    ..........................................................................................................................................  Reviewed by Signature/Title    ...................................................              ..............................................  Date                                               Time          22EPIC Rev 08/18

## 2018-09-29 ENCOUNTER — APPOINTMENT (OUTPATIENT)
Dept: CARDIOLOGY | Facility: CLINIC | Age: 58
End: 2018-09-29
Attending: INTERNAL MEDICINE
Payer: MEDICARE

## 2018-09-29 VITALS
WEIGHT: 145 LBS | RESPIRATION RATE: 16 BRPM | DIASTOLIC BLOOD PRESSURE: 68 MMHG | SYSTOLIC BLOOD PRESSURE: 131 MMHG | HEART RATE: 89 BPM | HEIGHT: 60 IN | OXYGEN SATURATION: 95 % | BODY MASS INDEX: 28.47 KG/M2 | TEMPERATURE: 96.3 F

## 2018-09-29 LAB — INTERPRETATION ECG - MUSE: NORMAL

## 2018-09-29 PROCEDURE — 99235 HOSP IP/OBS SAME DATE MOD 70: CPT | Performed by: INTERNAL MEDICINE

## 2018-09-29 PROCEDURE — G0378 HOSPITAL OBSERVATION PER HR: HCPCS

## 2018-09-29 PROCEDURE — 93306 TTE W/DOPPLER COMPLETE: CPT

## 2018-09-29 PROCEDURE — 93306 TTE W/DOPPLER COMPLETE: CPT | Mod: 26 | Performed by: INTERNAL MEDICINE

## 2018-09-29 RX ORDER — ACETAMINOPHEN 650 MG/1
650 SUPPOSITORY RECTAL EVERY 4 HOURS PRN
Status: DISCONTINUED | OUTPATIENT
Start: 2018-09-29 | End: 2018-09-29 | Stop reason: HOSPADM

## 2018-09-29 RX ORDER — ATOMOXETINE 18 MG/1
54 CAPSULE ORAL
Status: ON HOLD | COMMUNITY
End: 2019-05-28

## 2018-09-29 RX ORDER — TRIAMCINOLONE ACETONIDE 5 MG/G
CREAM TOPICAL 3 TIMES DAILY PRN
COMMUNITY
End: 2019-02-22

## 2018-09-29 RX ORDER — CITALOPRAM HYDROBROMIDE 20 MG/1
40 TABLET ORAL DAILY
Status: DISCONTINUED | OUTPATIENT
Start: 2018-09-29 | End: 2018-09-29 | Stop reason: HOSPADM

## 2018-09-29 RX ORDER — ONDANSETRON 4 MG/1
4 TABLET, ORALLY DISINTEGRATING ORAL EVERY 6 HOURS PRN
Status: DISCONTINUED | OUTPATIENT
Start: 2018-09-29 | End: 2018-09-29 | Stop reason: HOSPADM

## 2018-09-29 RX ORDER — NALOXONE HYDROCHLORIDE 0.4 MG/ML
.1-.4 INJECTION, SOLUTION INTRAMUSCULAR; INTRAVENOUS; SUBCUTANEOUS
Status: DISCONTINUED | OUTPATIENT
Start: 2018-09-29 | End: 2018-09-29 | Stop reason: HOSPADM

## 2018-09-29 RX ORDER — ONDANSETRON 2 MG/ML
4 INJECTION INTRAMUSCULAR; INTRAVENOUS EVERY 6 HOURS PRN
Status: DISCONTINUED | OUTPATIENT
Start: 2018-09-29 | End: 2018-09-29 | Stop reason: HOSPADM

## 2018-09-29 RX ORDER — ACETAMINOPHEN 325 MG/1
650 TABLET ORAL EVERY 4 HOURS PRN
Status: DISCONTINUED | OUTPATIENT
Start: 2018-09-29 | End: 2018-09-29 | Stop reason: HOSPADM

## 2018-09-29 RX ORDER — CEFTRIAXONE 1 G/1
1 INJECTION, POWDER, FOR SOLUTION INTRAMUSCULAR; INTRAVENOUS EVERY 24 HOURS
Status: DISCONTINUED | OUTPATIENT
Start: 2018-09-29 | End: 2018-09-29 | Stop reason: HOSPADM

## 2018-09-29 RX ORDER — NITROFURANTOIN 25; 75 MG/1; MG/1
100 CAPSULE ORAL 2 TIMES DAILY
Qty: 14 CAPSULE | Refills: 0 | Status: SHIPPED | OUTPATIENT
Start: 2018-09-29 | End: 2018-10-15

## 2018-09-29 RX ORDER — DONEPEZIL HYDROCHLORIDE 10 MG/1
10 TABLET, FILM COATED ORAL AT BEDTIME
Status: DISCONTINUED | OUTPATIENT
Start: 2018-09-29 | End: 2018-09-29 | Stop reason: HOSPADM

## 2018-09-29 RX ORDER — CITALOPRAM HYDROBROMIDE 20 MG/1
40 TABLET ORAL DAILY
Status: DISCONTINUED | OUTPATIENT
Start: 2018-09-29 | End: 2018-09-29

## 2018-09-29 NOTE — H&P
[facility]  Hospitalist Admission Note  Name: Valentine Vu    MRN: [id number]  YOB: 1960    Age: 57 year old  Date of admission: 9/28/2018  Primary care provider: Sabrina Dunlap    Chief Complaint: Syncope    Assessment and Plan:   Syncope: A witnessed brief episode of loss of consciousness while sitting lasting about 2-3 minutes followed by short period of slurred speech then back to normal mental status.  Eyes rolled back and had an loss of postural tone.  She was noted to be sweaty and clammy.  No focal deficits on neuro exam.  Now at baseline.  Per the patient she has been having episodes in the evenings similar to this in the past week that have been unwitnessed.  MRI brain and MRA brain and neck without sign of stroke or significant stenosis.  Absence seizure possible, however no history of this.  No bladder or bowel incontinence.  Relatively very short postictal state if this was going on.  Suspect vasovagal syncope episodes the most likely especially given that she feels very warm and has clammy skin.  Cardiac etiology possible and is never had echocardiogram or monitoring of heart rhythm.  EKG is normal.  Does not appear to be on any medications that could be causing this.  Possible UTI as below and will treat this.  -Telemetry overnight and consideration to return to clinic on Monday for Zio patch monitor  -Obtain TTE to look at valves for syncope  -Seizure seems less likely had hold off on any neuro consultation at this time    Possible UTI: Denies dysuria, hematuria, urinary frequency.  Does have some mild suprapubic tenderness on exam.  Urinalysis with 73 WBCs and large LE so possibility of UTI.  No fever or leukocytosis.  Was given ceftriaxone.  Syncope episodes above possibly related.  -Continue ceftriaxone, but would only provide short course  -Urine culture added on    Mental retardation, cognitive deficits: Continue PTA donepezil.    MDD, anxiety: Continue PTA  Celexa.      DVT Prophylaxis: Low Risk/Ambulatory with no VTE prophylaxis indicated  Code Status: Full Code, discussed with her healthcare agent  FEN: Regular diet, no IV fluids  Discharge Dispo: Back to living facility  Estimated Disch Date / # of Days until Disch: Admit to observation for syncope evaluation.  If telemetry normal and TTE normal likely can discharge after this tomorrow      History of Present Illness:  Valentine Vu is a 57 year old female with PMH including mental retardation, cognitive deficits, anxiety, and MDD who presents following a witnessed episode of loss of consciousness.  The patient was in the backseat of the car and her sister was in the front.  Patient was fishing with her seatbelt and when her sister looked back she noticed that she stopped moving and her eyes were glazed over.  She did not respond to verbal stimuli.  She then shook her knee and the patient was limp and did not respond.  This lasted for 2-3 minutes.  After this she regained consciousness and had mild slurred speech for couple minutes.  Then back to her baseline normal mental status.  The patient felt warm and clammy at that time.  The patient reports that she has had similar episodes to this this past week but usually later in the evening.  These have been unwitnessed.  She denies any associated lightheadedness, palpitations, chest pain, abdominal pain.  On review of systems she denies any fevers, chills, flank pain, dysuria, urinary frequency, hematuria, diarrhea, nausea, vomiting.  Has not had issues with syncope in the past.  No history of seizures.    History obtained from patient, family members, medical record, and from Dr. Felix in the emergency department.  Vitally stable.  Afebrile.  CBC, BMP, troponin, TSH unremarkable.  Urinalysis with 73 white blood cells and large LE.  Suprapubic tenderness it was given ceftriaxone for possible UTI.  Urine culture added on.  EKG showing normal sinus rhythm with normal  intervals.  Request for observation admission for syncope evaluation.  ED provider did not feel comfortable sending the patient home with outpatient follow-up.  Admit to the observation unit for telemetry and echo in the morning.     Past Medical History reviewed:  Mental retardation  Deficits  MDD, anxiety    Past Surgical History reviewed:  Knee replacement in 2010  Tonsillectomy    Social History reviewed:  Never smoker, no alcohol, lives in a group home    Family History reviewed:  Brother  of leukemia    Allergies:  No Known Allergies  Medications:  Prior to Admission medications    Medication Sig Last Dose Taking? Auth Provider   carbamide peroxide (DEBROX) 6.5 % otic solution Use twice daily for 4 days prior to appt for ear cleaning.   Sabrina Dunlap MD   cetirizine (ZYRTEC) 10 MG tablet Take 1 tablet (10 mg) by mouth daily   Sabrina Dunlap MD   Cholecalciferol (VITAMIN D) 2000 units tablet Take 2,000 Units by mouth daily   Sabrina Dunlap MD   citalopram (CELEXA) 40 MG tablet Take 1 tablet (40 mg) by mouth daily   Sabrina Dunlap MD   donepezil (ARICEPT) 10 MG tablet Take 1 tablet (10 mg) by mouth At Bedtime   Sabrina Dunlap MD   ferrous sulfate (IRON) 325 (65 Fe) MG tablet Take 1 tablet (325 mg) by mouth daily (with breakfast)   Sabrina Dunlap MD   ibuprofen 200 MG capsule One tablet every 4 to 6 hours as needed   Sabrina Dunlap MD   multivitamin, therapeutic with minerals (MULTI-VITAMIN) TABS tablet Take 1 tablet by mouth daily   Sabrina Dunlap MD   psyllium (METAMUCIL MULTIHEALTH FIBER) 58.6 % POWD Take 18 g (1 Tablespoonful) by mouth daily   Sabrina Dunlap MD     Review of Systems:  A Comprehensive greater than 10 system review of systems was carried out.  Pertinent positives and negatives are noted above.  Otherwise negative.     Physical Exam:  Blood pressure 136/90, pulse 79, temperature 98.2  F (36.8  C), temperature source Oral, resp. rate 18,  weight 65 kg (143 lb 4.8 oz), SpO2 97 %.  Wt Readings from Last 1 Encounters:   09/28/18 65 kg (143 lb 4.8 oz)     Exam:  Constitutional: Awake, NAD  Eyes: sclera white   HEENT: atraumatic, MMM  Respiratory: no respiratory distress, lungs cta bilaterally, no crackles or wheeze  Cardiovascular: RRR.  No murmur   GI: non-tender, not distended, bowel sounds present  Skin: no rash or lesions, acyanotic  Musculoskeletal/extremities: atraumatic, no major deformities. No edema  Neurologic: A&O, speech clear, strength equal bilaterally, light touch sensation intact  Psychiatric: calm, cooperative, normal affect    Lab and imaging data personally reviewed:  Labs:    Recent Labs  Lab 09/28/18 2123   WBC 7.2   HGB 15.1   HCT 46.1   MCV 86          Recent Labs  Lab 09/28/18 2123      POTASSIUM 4.0   CHLORIDE 107   CO2 30   ANIONGAP 3   *   BUN 17   CR 0.70   GFRESTIMATED 86   GFRESTBLACK >90   [place] 8.5       Recent Labs  Lab 09/28/18 2123   TSH 2.20       Recent Labs  Lab 09/28/18 2123   TROPI <0.015       Recent Labs  Lab 09/28/18  2308   COLOR Yellow   APPEARANCE Slightly Cloudy   URINEGLC Negative   URINEBILI Negative   URINEKETONE Negative   SG 1.031   UBLD Negative   URINEPH 5.0   PROTEIN Negative   NITRITE Negative   LEUKEST Large*   RBCU 5*   WBCU 73*       EKG: NSR    Imaging:  Recent Results (from the past 24 hour(s))   MR Head w/o Contrast Angiogram    Narrative    MR ANGIOGRAM OF THE HEAD WITHOUT CONTRAST   9/28/2018 10:38 PM     HISTORY: Slurred speech     TECHNIQUE: 3D time-of-flight MR angiogram of the head without  contrast.    COMPARISON: None.    FINDINGS: The bilateral intracranial vertebral arteries, basilar  artery, and posterior cerebral arteries are patent. The internal  carotid arteries, anterior cerebral arteries, and middle cerebral  arteries are patent. No aneurysms or vascular malformations are  identified.       Impression    IMPRESSION:  Normal MR angiogram of the head.         TANA VELA MD   MR Brain w/o & w Contrast    Narrative    MRI BRAIN WITHOUT AND WITH CONTRAST  9/28/2018 10:56 PM    HISTORY: Slurred speech, evaluate CVA.      TECHNIQUE: Multiplanar, multisequence MRI of the brain without and  with 10 mL Gadavist.    COMPARISON: None.    FINDINGS: The cerebral hemispheres, brainstem, and cerebellum  demonstrate normal morphology and signal. No evidence of acute  ischemia, hemorrhage, mass, mass effect, or hydrocephalus. No abnormal  enhancement or diffusion restriction is identified. The visualized  calvarium, skull base, paranasal sinuses, and extracranial soft  tissues are unremarkable.      Impression    IMPRESSION: Unremarkable MRI of the head with and without contrast. No  evidence of infarct.    TANA VELA MD   MR Neck w/o & w Contrast Angiogram    Narrative    MRA NECK WITHOUT AND WITH CONTRAST  9/28/2018 10:57 PM     HISTORY: Evaluate for dissection, vertebrobasilar flow, carotid  stenosis;     TECHNIQUE: 2D time-of-flight MR angiogram of the neck without contrast  and 3D MR angiogram of the neck with  10 mL Gadavist. Estimates of  carotid stenoses are made relative to the distal internal carotid  artery diameters except as noted.    COMPARISON: None.    FINDINGS:    Normal origin of the great vessels from the aortic arch.    Right carotid artery: The right common and internal carotid arteries  are patent. No significant stenosis.      Left carotid artery: The left common and internal carotid arteries are  patent. No significant stenosis.      Vertebral arteries: Vertebral arteries appear patent without evidence  of dissection. No significant stenosis.        Impression    IMPRESSION: Normal MR angiogram of the neck.        MD Darryl ROGERS MD  Hospitalist  [facility]

## 2018-09-29 NOTE — DISCHARGE SUMMARY
Replaced by [facility] Unit  Discharge Summary        Valentine Vu MRN# [id number]   YOB: 1960 Age: 57 year old     Date of Admission:  9/28/2018  Date of Discharge:  9/29/2018  Admitting Physician:  Darryl Zayas MD  Discharge Physician: Lindsay Bellamy PA-C, WILNER  Discharging Service: Hospitalist      Primary Provider: Sabrina Dunlap  Primary Care Physician Phone Number: [phone]         Primary Discharge Diagnoses:    Valentine Vu is a 57 year old female with PMH including mental retardation, cognitive deficits, anxiety, and MDD who was admitted on 9/28/18 following a witnessed episode of loss of consciousness.  The patient was in the backseat of the car and her sister was in the front.  Patient was putting her seatbelt on when her sister looked back and noticed that the patient had stopped moving and her eyes were glazed over.  She did not respond to verbal stimuli.  She then shook her knee and the patient was limp and did not respond.  This lasted for 2-3 minutes.  After this she regained consciousness and had mild slurred speech for couple minutes.  Then back to her baseline normal mental status.  The patient felt warm and clammy at that time.  The patient reports that she has had similar episodes to this this past week but usually later in the evening.  These have been unwitnessed.  She denied any associated lightheadedness, palpitations, chest pain, abdominal pain, fevers, chills, flank pain, dysuria, urinary frequency, hematuria, diarrhea, nausea, vomiting.  Has not had issues with syncope in the past.  No history of seizures. Labs were obtained including BMP, CBC, TSH, and troponin, that were all normal. UA was abnormal MRI of the brain and MRA of the head and neck were also obtained that were normal. She was monitored on telemetry overnight, which showed SR, and an ECHO was obtained. ECHO was normal as well.      1. Syncopal Episode: telemetry and ECHO were  all normal. Patient will be sent home with plans to place a Ziopatch for the next 2 weeks for further evaluation. If this is normal, then plan to have patient follow up with outpatient Neurology.     2. UTI - UC still pending. Will cover with Macrobid 100mg BID for 7 days.           Secondary Discharge Diagnoses:   Mental retardation   Cognitive deficits   MDD  Anxiety            Code Status:      Full Code        Brief Hospital Summary:         Please refer to initial admission history and physical for further details.   Briefly, Valentine Vu was admitted on 9/28/2018 for episode of syncope.  Initial work up in the ED did not reveal evidence of significant cardiac arrhthymias or neurologic abnormalities.  Pt was registered to the Observation Unit for further evaluation.      Pt was placed on telemetry and started on IVF hydration. ECHO was performed to r/o significant valvular dysfunction with results outlined below. Labs reviewed and significant results addressed. On the day of discharge, pt has not had any further episodes of syncope, no significant arrhythmias detected or concerning ECHO findings as the cause of syncope. Vitals were stable and pt was deemed safe for discharge. Medications were reviewed and adjustments made as necessary. Pt is instructed to follow up as below.           Significant Lab During Hospitalization:      No results for input(s): PH, PHV, PO2, PO2V, SAT, PCO2, PCO2V, HCO3, HCO3V in the last 168 hours.    Recent Labs  Lab 09/28/18  2123   WBC 7.2   HGB 15.1   HCT 46.1   MCV 86             Lab Results   Component Value Date     09/28/2018     08/03/2018     10/10/2010    Lab Results   Component Value Date    CHLORIDE 107 09/28/2018    CHLORIDE 108 08/03/2018    CHLORIDE 102 10/10/2010    Lab Results   Component Value Date    BUN 17 09/28/2018    BUN 15 08/03/2018    BUN 18 10/07/2010      Lab Results   Component Value Date    POTASSIUM 4.0 09/28/2018    POTASSIUM  5.3 08/03/2018    POTASSIUM 4.1 10/10/2010    Lab Results   Component Value Date    CO2 30 09/28/2018    CO2 29 08/03/2018    CO2 30 10/10/2010    Lab Results   Component Value Date    CR 0.70 09/28/2018    CR 0.55 08/03/2018    CR 0.66 10/07/2010        No results for input(s): CULT in the last 168 hours.    Recent Labs  Lab 09/28/18 2123      POTASSIUM 4.0   CHLORIDE 107   CO2 30   ANIONGAP 3   *   BUN 17   CR 0.70   GFRESTIMATED 86   GFRESTBLACK >90   OR[place]O 8.5     No results for input(s): NTBNPI, NTBNP in the last 168 hours.  No results for input(s): DD in the last 168 hours.  No results for input(s): INR in the last 168 hours.  No results for input(s): LACT in the last 168 hours.  No results for input(s): LIPASE in the last 168 hours.  No results for input(s): CHOL, HDL, LDL, TRIG, CHOLHDLRATIO in the last 168 hours.    Recent Labs  Lab 09/28/18 2123   TSH 2.20       Recent Labs  Lab 09/28/18 2123   TROPI <0.015       Recent Labs  Lab 09/28/18  2308   COLOR Yellow   APPEARANCE Slightly Cloudy   URINEGLC Negative   URINEBILI Negative   URINEKETONE Negative   SG 1.031   UBLD Negative   URINEPH 5.0   PROTEIN Negative   NITRITE Negative   LEUKEST Large*   RBCU 5*   WBCU 73*                Significant Imaging During Hospitalization:      Recent Results (from the past 48 hour(s))   MR Head w/o Contrast Angiogram    Narrative    MR ANGIOGRAM OF THE HEAD WITHOUT CONTRAST   9/28/2018 10:38 PM     HISTORY: Slurred speech     TECHNIQUE: 3D time-of-flight MR angiogram of the head without  contrast.    COMPARISON: None.    FINDINGS: The bilateral intracranial vertebral arteries, basilar  artery, and posterior cerebral arteries are patent. The internal  carotid arteries, anterior cerebral arteries, and middle cerebral  arteries are patent. No aneurysms or vascular malformations are  identified.       Impression    IMPRESSION:  Normal MR angiogram of the head.        TANA VELA MD   MR Brain w/o & w  Contrast    Narrative    MRI BRAIN WITHOUT AND WITH CONTRAST  2018 10:56 PM    HISTORY: Slurred speech, evaluate CVA.      TECHNIQUE: Multiplanar, multisequence MRI of the brain without and  with 10 mL Gadavist.    COMPARISON: None.    FINDINGS: The cerebral hemispheres, brainstem, and cerebellum  demonstrate normal morphology and signal. No evidence of acute  ischemia, hemorrhage, mass, mass effect, or hydrocephalus. No abnormal  enhancement or diffusion restriction is identified. The visualized  calvarium, skull base, paranasal sinuses, and extracranial soft  tissues are unremarkable.      Impression    IMPRESSION: Unremarkable MRI of the head with and without contrast. No  evidence of infarct.    TANA VELA MD   MR Neck w/o & w Contrast Angiogram    Narrative    MRA NECK WITHOUT AND WITH CONTRAST  2018 10:57 PM     HISTORY: Evaluate for dissection, vertebrobasilar flow, carotid  stenosis;     TECHNIQUE: 2D time-of-flight MR angiogram of the neck without contrast  and 3D MR angiogram of the neck with  10 mL Gadavist. Estimates of  carotid stenoses are made relative to the distal internal carotid  artery diameters except as noted.    COMPARISON: None.    FINDINGS:    Normal origin of the great vessels from the aortic arch.    Right carotid artery: The right common and internal carotid arteries  are patent. No significant stenosis.      Left carotid artery: The left common and internal carotid arteries are  patent. No significant stenosis.      Vertebral arteries: Vertebral arteries appear patent without evidence  of dissection. No significant stenosis.        Impression    IMPRESSION: Normal MR angiogram of the neck.        TANA VELA MD     [id number]  [id number]  [id number]  [id number]^ROYA^LACY^[facility]  Echocardiography Laboratory  [address] [place] [address]        Name: JOYCE CAMACHO  MRN: [id number]  : 1960  Study Date: 2018 09:24  AM  Age: 57 yrs  Gender: Female  Patient Location: [facility]  Reason For Study: Syncope  Ordering Physician: LACY PRYOR  Referring Physician: GLENYS SYLVESTER  Performed By: Adrian Bynum RDCS     BSA: 1.6 m2  Height: 60 in  Weight: 145 lb  HR: 79  BP: 137/73 mmHg  _____________________________________________________________________________  __        Procedure  Complete Portable Echo Adult.  _____________________________________________________________________________  __        Interpretation Summary     Left ventricular systolic function is normal.The visual ejection fraction is  estimated at 60-65%.  The right ventricular systolic function is normal.  There is mild (1+) mitral regurgitation.  _____________________________________________________________________________  __        Left Ventricle  The left ventricle is normal in size. Proximal septal thickening is noted.  There is mild concentric left ventricular hypertrophy. Left ventricular  diastolic function is indeterminate. Left ventricular systolic function is  normal. The visual ejection fraction is estimated at 60-65%. No regional wall  motion abnormalities noted.     Right Ventricle  The right ventricle is normal size. The right ventricular systolic function is  normal.     Atria  Normal left atrial size. Right atrial size is normal. There is no color  Doppler evidence of an atrial shunt.     Mitral Valve  There is mild (1+) mitral regurgitation.        Tricuspid Valve  There is trace tricuspid regurgitation. The right ventricular systolic  pressure is approximated at 24.5 mmHg plus the right atrial pressure.     Aortic Valve  The aortic valve is trileaflet. No aortic regurgitation is present. No aortic  stenosis is present.     Pulmonic Valve  The pulmonic valve is not well visualized.     Vessels  The aortic root is normal size. Normal size ascending aorta. The IVC is normal  in size and reactivity with respiration, suggesting normal central  venous  pressure.     Pericardium  There is no pericardial effusion.     _____________________________________________________________________________  __  MMode/2D Measurements & Calculations  IVSd: 1.3 cm  LVIDd: 3.0 cm  LVIDs: 1.6 cm  LVPWd: 0.91 cm  FS: 45.8 %  LV mass(C)d: 97.2 grams  LV mass(C)dI: 59.7 grams/m2     Ao root diam: 2.5 cm  LA dimension: 3.1 cm  asc Aorta Diam: 3.2 cm  LA/Ao: 1.2  LA Volume (BP): 24.2 ml  LA Volume Index (BP): 14.8 ml/m2  RWT: 0.61        Doppler Measurements & Calculations  MV E max tessa: 68.1 cm/sec  MV A max tessa: 89.5 cm/sec  MV E/A: 0.76  MV dec time: 0.15 sec     Ao V2 max: 173.7 cm/sec  Ao max P.0 mmHg  TR max tessa: 247.3 cm/sec  TR max P.5 mmHg  E/E' avg: 10.6  Lateral E/e': 9.1  Medial E/e': 12.0           _____________________________________________________________________________  __           Report approved by: Taty Bowman 2018 12:43 PM         Pending Results:        Unresulted Labs Ordered in the Past 30 Days of this Admission     Date and Time Order Name Status Description    2018 2340 Urine Culture In process               Consultations This Hospital Stay:      No consultations were requested during this admission         Discharge Instructions and Follow-Up:      Follow-up Appointments     Follow-up and recommended labs and tests        1. A cardiac monitor has been ordered, called a Ziopatch, to monitor your   heart as an outpatient. Please call [phone] to get this scheduled.   2. You will be sent home with an oral antibiotics for probable UTI. Your   urine culture is still pending. Please follow up with your family doctor   to discuss these results.   3. Follow up with your family doctor within 1 week to discuss your recent   admission and for re-evaluation.                  Pt instructed to follow up with PCP in 7 days and with Consultant if indicated.   Follow-up Labs CBC        Discharge Disposition:      Discharged to home          Discharge Medications:        Current Discharge Medication List      START taking these medications    Details   nitroFURantoin, macrocrystal-monohydrate, (MACROBID) 100 MG capsule Take 1 capsule (100 mg) by mouth 2 times daily  Qty: 14 capsule, Refills: 0    Associated Diagnoses: Urinary tract infection without hematuria, site unspecified         CONTINUE these medications which have NOT CHANGED    Details   atomoxetine (STRATTERA) 18 MG capsule Take 54 mg by mouth every evening      cetirizine (ZYRTEC) 10 MG tablet Take 1 tablet (10 mg) by mouth daily  Qty: 90 tablet, Refills: 4    Associated Diagnoses: Chronic seasonal allergic rhinitis, unspecified trigger      Cholecalciferol (VITAMIN D) 2000 units tablet Take 2,000 Units by mouth daily  Qty: 100 tablet, Refills: 4    Associated Diagnoses: Primary osteoarthritis involving multiple joints      citalopram (CELEXA) 40 MG tablet Take 1 tablet (40 mg) by mouth daily  Qty: 90 tablet, Refills: 1    Associated Diagnoses: Major depression in complete remission (H)      donepezil (ARICEPT) 10 MG tablet Take 1 tablet (10 mg) by mouth At Bedtime  Qty: 90 tablet, Refills: 4    Associated Diagnoses: Mild dementia      ferrous sulfate (IRON) 325 (65 Fe) MG tablet Take 1 tablet (325 mg) by mouth daily (with breakfast)  Qty: 90 tablet, Refills: 4    Associated Diagnoses: Benign essential hypertension      multivitamin, therapeutic with minerals (MULTI-VITAMIN) TABS tablet Take 1 tablet by mouth daily  Qty: 100 tablet, Refills: 4    Associated Diagnoses: Primary osteoarthritis involving multiple joints      psyllium (METAMUCIL MULTIHEALTH FIBER) 58.6 % POWD Take 18 g (1 Tablespoonful) by mouth daily  Qty: 1 Bottle, Refills: 1    Associated Diagnoses: Other constipation      carbamide peroxide (DEBROX) 6.5 % otic solution Use twice daily for 4 days prior to appt for ear cleaning.  Qty: 15 mL, Refills: 4    Associated Diagnoses: Bilateral impacted cerumen      ibuprofen  200 MG capsule One tablet every 4 to 6 hours as needed  Qty: 120 capsule, Refills: 4    Associated Diagnoses: Primary osteoarthritis involving multiple joints      triamcinolone (KENALOG) 0.5 % cream Apply topically 3 times daily as needed for irritation (duration of treatment: 7-14 days PRN red marks on arm)                  Allergies:       No Known Allergies        Condition and Physical on Discharge:      Discharge condition: Stable   Vitals: Blood pressure 121/65, pulse 74, temperature 96.7  F (35.9  C), temperature source Oral, resp. rate 20, height 1.524 m (5'), weight 65.8 kg (145 lb), SpO2 96 %.  145 lbs 0 oz      GENERAL:  Comfortable.  PSYCH: pleasant, No acute distress.  HEENT:  PERRLA. Normal conjunctiva, normal hearing, nasal mucosa and Oropharynx are normal.  NECK:  Supple, no neck vein distention, adenopathy or bruits, normal thyroid.  HEART:  Normal S1, S2 with no murmur, no pericardial rub, gallops or S3 or S4.  LUNGS:  Clear to auscultation, normal Respiratory effort. No wheezing, rales or ronchi.  ABDOMEN:  Soft, no hepatosplenomegaly, normal bowel sounds. Non-tender, non distended.   EXTREMITIES:  No pedal edema, +2 pulses bilateral and equal.  SKIN:  Dry to touch, No rash, wound or ulcerations.  NEUROLOGIC:  CN 2-12 intact, BL 5/5 symmetric upper and lower extremity strength, sensation is intact with no focal deficits.

## 2018-09-29 NOTE — ED NOTES
[facility]  ED Nurse Handoff Report    Valentine Vu is a 57 year old female   ED Chief complaint: Neurologic Problem  . ED Diagnosis:   Final diagnoses:   Transient alteration of awareness     Allergies: No Known Allergies    Code Status: Full Code  Activity level - Baseline/Home:  Independent. Activity Level - Current:   Independent. Lift room needed: No. Bariatric: No   Needed: No   Isolation: No. Infection: Not Applicable.     Vital Signs:   Vitals:    09/28/18 2200 09/28/18 2315 09/28/18 2330 09/28/18 2357   BP:  129/86 136/90    Pulse:       Resp:       Temp:    98.2  F (36.8  C)   TempSrc:    Oral   SpO2: 96% 97% 97%    Weight:           Cardiac Rhythm:  ,      Pain level:    Patient confused: No. Patient Falls Risk: No.   Elimination Status: Has voided   Patient Report - Initial Complaint: Pt with episodes of feeling very hot and sweaty over the last few days.  Tonight family member was with her and stated pt was completely non verbal during episode and was unable to get her attention for two full minutes.  Family states pt came out of it with slurred speech.  Arrives with sx resolved.. Focused Assessment: Cognitive - Cognitive/Neuro/Behavioral WDL:  WDL except; all (Episode of unresponsiveness about 1 hour ago. Pt was hot and sweaty and not responding. Pt said she was able to hear everyone but couldn't respond. Family says pt had slurred speech afterwards and was slow to respond. Pt says she's had these episodes sev) Level Of Consciousness: alert Arousal Level: opens eyes spontaneously Orientation: slow to respond; oriented x 4 Follows Commands: yes Speech: clear Best Language: 0 - No aphasia  Estrella Coma Scale - Best Eye Response: 4-->(E4) spontaneous Best Motor Response: 6-->(M6) obeys commands Best Verbal Response: 5-->(V5) oriented [place] Coma Scale Score: 15  Pupils - Pupil PERRLA: yes  Motor Strength - Left Upper: 5 - active movement against gravity and full resistance Right  Upper: 5 - active movement against gravity and full resistance Left Lower: 5 - active movement against gravity and full resistance Right Lower: 5 - active movement against gravity and full resistance   Tests Performed: labs, MRI, UA, EKG. Abnormal Results:   Labs Ordered and Resulted from Time of ED Arrival Up to the Time of Departure from the ED   CBC WITH PLATELETS DIFFERENTIAL - Abnormal; Notable for the following:        Result Value    RBC Count 5.34 (*)     All other components within normal limits   BASIC METABOLIC PANEL - Abnormal; Notable for the following:     Glucose 103 (*)     All other components within normal limits   ROUTINE UA WITH MICROSCOPIC - Abnormal; Notable for the following:     Leukocyte Esterase Urine Large (*)     WBC Urine 73 (*)     RBC Urine 5 (*)     Squamous Epithelial /HPF Urine 3 (*)     Mucous Urine Present (*)     All other components within normal limits   GLUCOSE BY METER - Abnormal; Notable for the following:     Glucose 111 (*)     All other components within normal limits   TSH WITH FREE T4 REFLEX   TROPONIN I   URINE CULTURE AEROBIC BACTERIAL     MR Neck w/o & w Contrast Angiogram   Final Result   IMPRESSION: Normal MR angiogram of the neck.           TANA VELA MD      MR Brain w/o & w Contrast   Final Result   IMPRESSION: Unremarkable MRI of the head with and without contrast. No   evidence of infarct.      TANA VELA MD      MR Head w/o Contrast Angiogram   Final Result   IMPRESSION:  Normal MR angiogram of the head.           TANA VELA MD        .   Treatments provided: rocephin  Family Comments: sister roseline at bedside; group home staff member  OBS brochure/video discussed/provided to patient:  Yes  ED Medications:   Medications   cefTRIAXone (ROCEPHIN) 1 g vial to attach to  mL bag for ADULTS or NS 50 mL bag for PEDS (1 g Intravenous New Bag 9/28/18 8581)   gadobutrol (GADAVIST) injection 10 mL (10 mLs Intravenous Given 9/28/18 2211)   sodium  chloride (PF) 0.9% PF flush 60 mL (60 mLs Intravenous Given 9/28/18 7084)     Drips infusing:  No  For the majority of the shift, the patient's behavior Green. Interventions performed were none.     Severe Sepsis OR Septic Shock Diagnosis Present: No      ED Nurse Name/Phone Number: Claudia Escobar,   12:06 AM  RECEIVING UNIT ED HANDOFF REVIEW    Above ED Nurse Handoff Report was reviewed: Yes  Reviewed by: Vicki Nuñez on September 29, 2018 at 1:11 AM

## 2018-09-29 NOTE — ED PROVIDER NOTES
"  History     Chief Complaint:  Neurologic Problem    HPI   Valentine Vu is a 57 year old female, with history of mild retardation, hypertension, dementia, anxiety and major depression, who presents with her sister and staff member from home to the emergency department for evaluation of possible neurologic problem. Sister noted that patient was baseline while at dinner and around 2000, they were leaving the restaurant and were in the car. Per sister, she could hear the patient fussing with the seatbelt. Sister turned to patient to ask her a question and noted that patient did not respond. Patient is hard of hearing and sister thought that patient could not hear so spoke even louder, but patient again did not respond. Sister endorsed that \"it looked like no one was there when I looked at her eyes\", thus pulled over and shook the patient and yet again, patient did not response. Sister noted after approximately 2-3 minutes, patient was able to respond and sister noted that her speech was slurred. Patient endorsed that she was able to hear patient, but was not able to respond. She noted that this was \"earlier than it normally happens\", admitting that she has similar episodes daily around 2100 for the last two weeks. She notes she typically becomes diaphoretic before one of these episodes, but returns to baseline after 2-3 minutes.    Here in the ED, patient endorses she feels baseline. She denies any lightheadedness, chest pain, shortness of breath, one sided weakness or double/blurry vision. Of note, patient had a recent flu shot this week and got new glasses in July.     Allergies:  No Known Drug Allergies     Medications:    Debrox  Zyrtec  Vitamin D  Celexa   Aricept  Iron  Ibuprofen  Multi-vitamin  Psyllium    Past Medical History:    Anxiety  Hearing loss of right ear  Mildly mentally retarded  Hypertension  Mild dementia  Major depression    Past Surgical History:    Knee replacement  Tonsillectomy    Family " History:    TIA    Social History:  The patient was accompanied to the ED by sister and home staff member.  Smoking Status: No  Smokeless Tobacco: No  Alcohol Use: No   Marital Status:  Single [1]     Review of Systems   Constitutional: Positive for diaphoresis.   Eyes: Negative for visual disturbance.   Respiratory: Negative for shortness of breath.    Cardiovascular: Negative for chest pain.   Neurological: Positive for speech difficulty. Negative for weakness and light-headedness.   All other systems reviewed and are negative.    Physical Exam   Vitals:  Patient Vitals for the past 24 hrs:   BP Temp Temp src Pulse Heart Rate Resp SpO2 Weight   09/28/18 2357 - 98.2  F (36.8  C) Oral - - - - -   09/28/18 2330 136/90 - - - - - 97 % -   09/28/18 2315 129/86 - - - - - 97 % -   09/28/18 2200 - - - - 82 - 96 % -   09/28/18 2145 - - - - 93 - 96 % -   09/28/18 2130 - - - - 99 - 97 % -   09/28/18 2115 - - - - - - 96 % -   09/28/18 2114 156/79 97.7  F (36.5  C) Oral 79 79 18 98 % 65 kg (143 lb 4.8 oz)      Physical Exam  Constitutional: Well developed, nontox appearance  Head: Atraumatic.   Mouth/Throat: Oropharynx is clear and moist.   Neck:  no stridor  Eyes: no scleral icterus, PERRL, EOMI, visual fields intact  Cardiovascular: RRR, 2+ bilat radial pulses  Pulmonary/Chest: nml resp effort, Clear BS bilat  Abdominal: ND, +BS, soft, NT, no rebound or guarding   : no CVA tenderness bilat  Ext: Warm, well perfused, no edema  Neurological: A&O,  CNII-XII intact, nml finger to nose, 5/5 strength throughout upper and lower ext, symmetric; sensation grossly intact  Skin: Skin is warm and dry.   Psychiatric: Behavior is normal. Thought content normal.   Nursing note and vitals reviewed.  Emergency Department Course     ECG:  ECG taken at 2136, ECG read at 2140 by Dr. Felix  Normal sinus rhythm  Normal ECG  Rate 79 bpm. IL interval 162. QRS duration 90. QT/QTc 380/435. P-R-T axes 20 29 68.     Imaging:  Radiology findings were  communicated with the patient and family who voiced understanding of the findings.  MR Brain w/o & w/ Contrast:  IMPRESSION: Unremarkable MRI of the head with and without contrast. No  evidence of infarct.  Reading per radiology.    MR Neck w/o & w Contrast Angiogram:  IMPRESSION: Normal MR angiogram of the neck.   Reading per radiology.     MR Head w/o Contrast Angiogram:  IMPRESSION:  Normal MR angiogram of the head.    Reading per radiology.     Laboratory:  Laboratory findings were communicated with the patient and family who voiced understanding of the findings.  CBC: AWNL (WBC 7.2, HGB 15.1, )  BMP: Glucose 103 (H) o/w WNL (Creatinine 0.70)  Glucose by Meter (Collected 2114): 111 (H)  Troponin (Collected 2123): <0.015  TSH with free T4 reflex: 2.20     UA with Microscopic: Leukocyte Esterase Urine Large (A), WBC/HPF 73 (H), RBC/HPF 5 (H), Mucous Urine Present (A), Squamous Epithelial/HPF Urine 3 (H) o/w WNL     Interventions:  2356 Rocephin 1 g IV     Emergency Department Course:  Nursing notes and vitals reviewed.  IV was inserted and blood was drawn for laboratory testing, results above.  The patient provided a urine sample here in the emergency department. This was sent for laboratory testing, findings above.  EKG obtained in the ED, see results above.    The patient was sent for a MR Brain w/o & w/ Contrast, MR Neck w/o & w Contrast Angiogram, MR Head w/o Contrast Angiogram while in the emergency department, results above.      9:17 PM: I performed an exam of the patient as documented above. History obtained from patient and sister.  9:56 PM: I spoke with Radiology service regarding patient's presentation, findings, and plan of care.   11:45 PM: Checked patient and discussed plan of care. Updated regarding results.   12:04 AM: I spoke with Dr. Zayas of the Hospitalist service regarding patient's presentation, findings, and plan of care. They will admit patient for further care.     I discussed the  treatment plan with the patient. They expressed understanding of this plan and consented to admission. I discussed the patient with Dr. Zayas, who will admit the patient to a monitored bed for further evaluation and treatment.     I personally reviewed the laboratory results with the Patient, sister and caregiver and answered all related questions prior to admission.    Impression & Plan      Medical Decision Makin year old female presenting w/ transient alteration in consciousness     DDx includes syncope, near syncope, seizure, electrolyte abnormality, infection, TIA, CVA.  The history is somewhat convoluted is not classically fit any of the aforementioned etiologies.  Patient's sister reports the patient may be stating she remembers the incident when she may have just heard her sister and staff member recounting the episode.   EKG interpretation as noted above.  Labs and imaging ordered as noted above.  Labs significant for UA with elevated white blood cells, leukoesterase, otherwise unremarkable, troponin undetectable.  Imaging sig for MRI without CVA or obvious acute vascular abnormality.  We will the patient has no urinary symptoms, a culture was ordered and she is given ceftriaxone in the event that this is an atypical presentation of a UTI. It seems unlikely the patient had a TIA if she completely lost consciousness.  It seems possible that the patient is experiencing syncope or seizures but there is no obvious discrete postictal period.  The patient reports that she has been having similar episodes each day for the last week to a week and a half.  I think it would be reasonable to admit the patient to observation with potential that these are recurrent syncopal episodes although symptomatology is not classic.  The patient was subsequently admitted to observation for further evaluation and management.  She and her family are counseled on results, diagnosis and disposition.  They are understanding and  agreeable to plan.  Patient was admitted in stable condition.       Diagnosis:    ICD-10-CM    1. Transient alteration of awareness R40.4 Urine Culture        Disposition:   Admission.    Scribe Disclosure:  I, Candelaria Patiño, am serving as a scribe at 9:16 PM on 9/28/2018 to document services personally performed by Timothy Felix MD, based on my observations and the provider's statements to me.  9/28/2018   [facility] EMERGENCY DEPARTMENT       Timothy Felix MD  09/29/18 0120

## 2018-09-29 NOTE — PLAN OF CARE
Problem: Patient Care Overview  Goal: Plan of Care/Patient Progress Review  PRIMARY DIAGNOSIS: SYNCOPE/TIA  OUTPATIENT/OBSERVATION GOALS TO BE MET BEFORE DISCHARGE:  1. Orthostatic performed: No    2. Diagnostic testing complete & at baseline neurologic testing: Yes    3. Cleared by consultants (if involved): No    4. Interpretation of cardiac rhythm per telemetry tech: SR    5. Tolerating adequate PO diet and medications: Yes    6. Return to near baseline physical activity or neurologic status: Yes    Discharge Planner Nurse   Safe discharge environment identified: Yes  Barriers to discharge: No       Entered by: Vicki Nuñez 09/29/2018 4:35 AM     Please review provider order for any additional goals.   Nurse to notify provider when observation goals have been met and patient is ready for discharge.   Pt up SBA, VSS. A/O. Slow to respond to answers which is baseline. [facility] group home caregiver at bedside. Will continue to monitor.

## 2018-09-29 NOTE — ED TRIAGE NOTES
"Episode of unresponsiveness about 1 hour ago. Pt was hot and sweaty and not responding. Pt said she was able to hear everyone but couldn't respond. Family says pt had slurred speech afterwards and was slow to respond. Pt says she's had these episodes several times over the last few weeks. She says she just laid down when she \"felt it coming on\" before and she felt better. . A&Ox3. ABCs intact. VSS.   "

## 2018-09-29 NOTE — CONSULTS
Writer met with pt and sister, [place], in pt's room. Sister indicated she is pt's guardian. Pt is from a group home and staff [facility] was here this morning. [place] plans to transport pt back to group home and d/c'd from observation unit. Writer called Jocelyn and spoke with her- pt able to return today.     Discharge orders can be faxed to [phone]. [facility] would prefer any medications are filled here if possible, if not the orders should be sent to [facility] who delivers to the group home- p: [phone] f: [phone].  Additionally, Jocelyn would like a call from the MD or RN once discharge orders are completed so she can be made aware of the outcome of this hospitalization and any follow ups needed.

## 2018-09-29 NOTE — PLAN OF CARE
Problem: Patient Care Overview  Goal: Plan of Care/Patient Progress Review  ROOM # 225    Living Situation (if not independent, order SW consult): lives in group home  Facility name:  : [facility]- group home and sister is primary contact    Activity level at baseline: independent  Activity level on admit: SBA      Patient registered to observation; given Patient Bill of Rights; given the opportunity to ask questions about observation status and their plan of care.  Patient has been oriented to the observation room, bathroom and call light is in place.    Discussed discharge goals and expectations with patient/family.

## 2018-09-29 NOTE — PHARMACY-ADMISSION MEDICATION HISTORY
Admission medication history interview status for this patient is complete. See [facility] admission navigator for allergy information, prior to admission medications and immunization status.     Medication history interview source(s):Patient and Caregiver  Medication history resources (including written lists, pill bottles, clinic record):Medlist  Primary pharmacy:NA    Changes made to PTA medication list:  Added: Triamcinolone and Straterra  Deleted: None  Changed: None    Actions taken by pharmacist (provider contacted, etc):None     Additional medication history information:None    Medication reconciliation/reorder completed by provider prior to medication history? Yes    Do you take OTC medications (eg tylenol, ibuprofen, fish oil, eye/ear drops, etc)? Y(Y/N)    For patients on insulin therapy: N (Y/N)    Prior to Admission medications    Medication Sig Last Dose Taking? Auth Provider   atomoxetine (STRATTERA) 18 MG capsule Take 54 mg by mouth every evening 9/28/2018 at PM Yes Unknown, Entered By History   cetirizine (ZYRTEC) 10 MG tablet Take 1 tablet (10 mg) by mouth daily 9/28/2018 at AM Yes Sabrina Dunlap MD   Cholecalciferol (VITAMIN D) 2000 units tablet Take 2,000 Units by mouth daily 9/28/2018 at AM  Yes Sabrina Dunlap MD   citalopram (CELEXA) 40 MG tablet Take 1 tablet (40 mg) by mouth daily 9/28/2018 at AM Yes Sabrina Dunlap MD   donepezil (ARICEPT) 10 MG tablet Take 1 tablet (10 mg) by mouth At Bedtime 9/28/2018 at PM Yes Sabrina Dunlap MD   ferrous sulfate (IRON) 325 (65 Fe) MG tablet Take 1 tablet (325 mg) by mouth daily (with breakfast) 9/28/2018 at AM Yes Sabrina Dunlap MD   multivitamin, therapeutic with minerals (MULTI-VITAMIN) TABS tablet Take 1 tablet by mouth daily 9/28/2018 at AM Yes Sabrina Dunlap MD   psyllium (METAMUCIL MULTIHEALTH FIBER) 58.6 % POWD Take 18 g (1 Tablespoonful) by mouth daily 9/28/2018 at PM Yes Sabrina Dunlap MD   carbamide peroxide  (DEBROX) 6.5 % otic solution Use twice daily for 4 days prior to appt for ear cleaning. Unknown at Unknown time  Sabrina Dunlap MD   ibuprofen 200 MG capsule One tablet every 4 to 6 hours as needed Unknown at Unknown time  Sabrina Dunlap MD   triamcinolone (KENALOG) 0.5 % cream Apply topically 3 times daily as needed for irritation Unknown at Unknown time  Unknown, Entered By History

## 2018-09-29 NOTE — PLAN OF CARE
Problem: Patient Care Overview  Goal: Plan of Care/Patient Progress Review  Outcome: Adequate for Discharge Date Met: 09/29/18  Patient's After Visit Summary was reviewed with patient and sister (guardian)  Patient verbalized understanding of After Visit Summary, recommended follow up and was given an opportunity to ask questions.   Discharge medications sent home with patient/family: YES, antibiotic sent with sister   Discharged with sister    OBSERVATION patient END time: 1428    Patient was stable and provided a wheelchair ride out to car. Sister/guardian is providing transportation back to group home and orders were sent with sister.

## 2018-09-29 NOTE — ED TRIAGE NOTES
Pt with episodes of feeling very hot and sweaty over the last few days.  Tonight family member was with her and stated pt was completely non verbal during episode and was unable to get her attention for two full minutes.  Family states pt came out of it with slurred speech.  Arrives with sx resolved.

## 2018-09-29 NOTE — ED TRIAGE NOTES
Pt delayed in answering questions. Not answering orientation questions correctly the first time but corrects self quickly

## 2018-09-29 NOTE — PLAN OF CARE
Problem: Patient Care Overview  Goal: Plan of Care/Patient Progress Review  Outcome: No Change  PRIMARY DIAGNOSIS: TIA  OUTPATIENT/OBSERVATION GOALS TO BE MET BEFORE DISCHARGE:  1. Orthostatic performed: NA    2. Diagnostic testing complete & at baseline neurologic testing: Yes    3. Cleared by consultants (if involved): NA    4. Interpretation of cardiac rhythm per telemetry tech: SR rate 88    5. Tolerating adequate PO diet and medications: Yes    6. Return to near baseline physical activity or neurologic status: Yes    Discharge Planner Nurse   Safe discharge environment identified: Yes  Barriers to discharge: No       Entered by: Cami Glez 09/29/2018 9:38 AM     Please review provider order for any additional goals.   Nurse to notify provider when observation goals have been met and patient is ready for discharge.    Patient is alert and oriented. Patient denies any current lightheadedness/dizziness. Patient says she has had multiple episodes lately (last week or so) where she knows what she wants to say but can not get the words out. Patient says she gets very hot and clammy during these episodes also. Plan is for echo and to continue to monitor on tele. Patient is from group home and staff is present in the room now.

## 2018-09-30 LAB
BACTERIA SPEC CULT: NORMAL
SPECIMEN SOURCE: NORMAL

## 2018-10-01 NOTE — TELEPHONE ENCOUNTER
Group home called to make an appt and also to let you know that the pt is receiving a heart monitor this Wed oct 3.

## 2018-10-02 ENCOUNTER — HEALTH MAINTENANCE LETTER (OUTPATIENT)
Age: 58
End: 2018-10-02

## 2018-10-03 ENCOUNTER — HOSPITAL ENCOUNTER (OUTPATIENT)
Dept: CARDIOLOGY | Facility: CLINIC | Age: 58
Discharge: HOME | End: 2018-10-03
Attending: PHYSICIAN ASSISTANT | Admitting: PHYSICIAN ASSISTANT
Payer: MEDICARE

## 2018-10-03 DIAGNOSIS — R55 SYNCOPE, UNSPECIFIED SYNCOPE TYPE: ICD-10-CM

## 2018-10-03 DIAGNOSIS — R40.4 TRANSIENT ALTERATION OF AWARENESS: ICD-10-CM

## 2018-10-03 PROCEDURE — 0296T ZIO PATCH HOLTER: CPT | Performed by: PHYSICIAN ASSISTANT

## 2018-10-03 PROCEDURE — 0298T ZZC EXT ECG > 48HR TO 21 DAY REVIEW AND INTERPRETATN: CPT | Performed by: INTERNAL MEDICINE

## 2018-10-15 ENCOUNTER — OFFICE VISIT (OUTPATIENT)
Dept: INTERNAL MEDICINE | Facility: CLINIC | Age: 58
End: 2018-10-15
Payer: MEDICARE

## 2018-10-15 VITALS
OXYGEN SATURATION: 100 % | SYSTOLIC BLOOD PRESSURE: 116 MMHG | BODY MASS INDEX: 27.85 KG/M2 | RESPIRATION RATE: 17 BRPM | HEART RATE: 99 BPM | TEMPERATURE: 98.4 F | DIASTOLIC BLOOD PRESSURE: 74 MMHG | WEIGHT: 142.6 LBS

## 2018-10-15 DIAGNOSIS — R55 SYNCOPE, UNSPECIFIED SYNCOPE TYPE: Primary | ICD-10-CM

## 2018-10-15 DIAGNOSIS — R06.83 SNORING: ICD-10-CM

## 2018-10-15 DIAGNOSIS — G44.209 TENSION HEADACHE: ICD-10-CM

## 2018-10-15 PROCEDURE — 99214 OFFICE O/P EST MOD 30 MIN: CPT | Performed by: INTERNAL MEDICINE

## 2018-10-15 RX ORDER — OMEGA-3 FATTY ACIDS/FISH OIL 300-1000MG
200-400 CAPSULE ORAL EVERY 4 HOURS PRN
Qty: 120 CAPSULE | Refills: 0 | Status: SHIPPED | OUTPATIENT
Start: 2018-10-15 | End: 2018-11-07 | Stop reason: DRUGHIGH

## 2018-10-15 NOTE — PROGRESS NOTES
SUBJECTIVE:   Valentine Vu is a 57 year old female who presents to clinic today for the following health issues:      Present with caretaker.    Hospital Follow-up Visit:    Hospital/Nursing Home/IP Rehab Facility: [facility]  Date of Admission: 9/28/18  Date of Discharge: 9/29/18  Reason(s) for Admission: Loss of consciousness    She has had 5 episodes approximately over the past 6 months that last 2-3 minutes.  She can hear people but not respond.  Denies any chest pain or shortness of breath or lightheadedness associated with it   She went to the hospital.  MRI of brain and neck unremarkable.  Echocardiogram unremarkable.  Labs unremarkable- CBC, CMP, TSH, troponin.  UA revealed possible UTI. Urine culture did not grow bacteria.  She was treated with antibiotic.    The patient reports that she has not hand any episodes since her hospital stay.  No new medications over the past 6 months.  She reports that her mood seems stable.    She has been having more headaches recently.  She also has had pain in her feet at times.  Ibuprofen 200mg 1 tab every 4-6 hours.  No eye pain.    No sinus pain.  She is taking zyrtec for allergies.  Denies any neck pain.  Headache occurs in the morning.  Does not snore or wake up abruptly.            Problems taking medications regularly:  None       Medication changes since discharge: macrobid       Problems adhering to non-medication therapy:  None    Summary of hospitalization:  [facility] discharge summary reviewed  Diagnostic Tests/Treatments reviewed.  Follow up needed: none  Other Healthcare Providers Involved in Patient s Care:         None  Update since discharge: improved.     Post Discharge Medication Reconciliation: discharge medications reconciled, continue medications without change.  Plan of care communicated with patient and caregiver     Coding guidelines for this visit:  Type of Medical   Decision Making Face-to-Face Visit       within 7 Days of  discharge Face-to-Face Visit        within 14 days of discharge   Moderate Complexity [id number]   High Complexity [id number]              PROBLEMS TO ADD ON...    Problem list and histories reviewed & adjusted, as indicated.  Additional history: as documented    Labs reviewed in EPIC    Reviewed and updated as needed this visit by clinical staff  Tobacco  Allergies  Meds  Med Hx  Surg Hx  Fam Hx  Soc Hx      Reviewed and updated as needed this visit by Provider         ROS:  CONSTITUTIONAL: NEGATIVE for fever, chills, change in weight  RESP: NEGATIVE for significant cough or SOB  CV: NEGATIVE for chest pain, palpitations or peripheral edema  Neuro: see above    OBJECTIVE:     /74  Pulse 99  Temp 98.4  F (36.9  C) (Oral)  Resp 17  Wt 142 lb 9.6 oz (64.7 kg)  SpO2 100%  BMI 27.85 kg/m2  Body mass index is 27.85 kg/(m^2).  GENERAL: healthy, alert and no distress  RESP: lungs clear to auscultation - no rales, rhonchi or wheezes  CV: regular rate and rhythm, normal S1 S2, no S3 or S4, no murmur, click or rub  Neuro: CN II-XII intact; moving all extremities    ASSESSMENT/PLAN:     (R55) Syncope, unspecified syncope type  (primary encounter diagnosis)  Comment: uncertain etiology- cardiac versus neuro mediated  Plan: NEUROLOGY ADULT REFERRAL, CARDIOLOGY EVAL ADULT        REFERRAL        -continue with cardiac monitor    (R06.83) Snoring  Comment:   Plan: SLEEP EVALUATION & MANAGEMENT REFERRAL - ADULT         [place] [place]          [phone] (Age 18 and up)            (G44.209) Tension headache  Comment:   Plan: ibuprofen 200 MG capsule                  Sabrina Dunlap MD  [facility]

## 2018-10-15 NOTE — NURSING NOTE
/74  Pulse 99  Temp 98.4  F (36.9  C) (Oral)  Resp 17  Wt 142 lb 9.6 oz (64.7 kg)  SpO2 100%  BMI 27.85 kg/m2   Patient in for hospital follow up.  Bisi Romero CMA

## 2018-10-15 NOTE — MR AVS SNAPSHOT
After Visit Summary   10/15/2018    Valentine Vu    MRN: [id number]           Patient Information     Date Of Birth          1960        Visit Information        Provider Department      10/15/2018 8:00 AM Sabrina Dunlap MD [facility]        Today's Diagnoses     Syncope, unspecified syncope type    -  1    Snoring        Tension headache           Follow-ups after your visit        Additional Services     CARDIOLOGY EVAL ADULT REFERRAL       Preferred location:  [facility] [phone]   [email or web address]    Please be aware that coverage of these services is subject to the terms and limitations of your health insurance plan.  Call member services at your health plan with any benefit or coverage questions.      Please bring the following to your appointment:  Any x-rays, CTs or MRIs which have been performed. Contact the facility where they were done to arrange for  prior to your scheduled appointment.    List of current medications  This referral request   Any documents/labs given to you for this referral            NEUROLOGY ADULT REFERRAL       Your provider has referred you for the following:   Consult at [facility]: [facility] Neurology [facility] [phone]   [email or web address]    Please be aware that coverage of these services is subject to the terms and limitations of your health insurance plan.  Call member services at your health plan with any benefit or coverage questions.      Please bring the following with you to your appointment:    (1) Any X-Rays, CTs or MRIs which have been performed.  Contact the facility where they were done to arrange for  prior to your scheduled appointment.    (2) List of current medications  (3) This referral request   (4) Any documents/labs given to you for this referral            SLEEP  EVALUATION & MANAGEMENT REFERRAL - [facility]  [phone] (Age 18 and up)       Please be aware that coverage of these services is subject to the terms and limitations of your health insurance plan.  Call member services at your health plan with any benefit or coverage questions.      Please bring the following to your appointment:    >>   List of current medications   >>   This referral request   >>   Any documents/labs given to you for this referral                      Future tests that were ordered for you today     Open Future Orders        Priority Expected Expires Ordered    SLEEP EVALUATION & MANAGEMENT REFERRAL - [facility]  [phone] (Age 18 and up) Routine  10/15/2019 10/15/2018            Who to contact     If you have questions or need follow up information about today's clinic visit or your schedule please contact [facility] directly at [phone].  Normal or non-critical lab and imaging results will be communicated to you by MyChart, letter or phone within 4 business days after the clinic has received the results. If you do not hear from us within 7 days, please contact the clinic through MyChart or phone. If you have a critical or abnormal lab result, we will notify you by phone as soon as possible.  Submit refill requests through [employer] or call your pharmacy and they will forward the refill request to us. Please allow 3 business days for your refill to be completed.          Additional Information About Your Visit        Care EveryWhere ID     This is your Care EveryWhere ID. This could be used by other organizations to access your [facility] medical records  [id number]        Your Vitals Were     Pulse Temperature Respirations Pulse Oximetry BMI (Body Mass Index)       99 98.4  F (36.9  C) (Oral) 17 100% 27.85 kg/m2        Blood Pressure from Last 3 Encounters:   10/15/18 116/74   09/29/18 131/68   08/03/18  138/68    Weight from Last 3 Encounters:   10/15/18 142 lb 9.6 oz (64.7 kg)   09/29/18 145 lb (65.8 kg)   08/03/18 147 lb (66.7 kg)              We Performed the Following     CARDIOLOGY EVAL ADULT REFERRAL     NEUROLOGY ADULT REFERRAL          Today's Medication Changes          These changes are accurate as of 10/15/18  8:40 AM.  If you have any questions, ask your nurse or doctor.               These medicines have changed or have updated prescriptions.        Dose/Directions    * ibuprofen 200 MG capsule   This may have changed:  Another medication with the same name was added. Make sure you understand how and when to take each.   Used for:  Primary osteoarthritis involving multiple joints   Changed by:  Sabrina Dunlap MD        One tablet every 4 to 6 hours as needed   Quantity:  120 capsule   Refills:  4       * ibuprofen 200 MG capsule   This may have changed:  You were already taking a medication with the same name, and this prescription was added. Make sure you understand how and when to take each.   Used for:  Tension headache   Changed by:  Sabrina Dunlap MD        Dose:  200-400 mg   Take 200-400 mg by mouth every 4 hours as needed for fever   Quantity:  120 capsule   Refills:  0       * Notice:  This list has 2 medication(s) that are the same as other medications prescribed for you. Read the directions carefully, and ask your doctor or other care provider to review them with you.      Stop taking these medicines if you haven't already. Please contact your care team if you have questions.     nitroFURantoin (macrocrystal-monohydrate) 100 MG capsule   Commonly known as:  MACROBID   Stopped by:  Sabrina Dunlap MD                Where to get your medicines      Some of these will need a paper prescription and others can be bought over the counter.  Ask your nurse if you have questions.     Bring a paper prescription for each of these medications     ibuprofen 200 MG capsule                 Primary Care Provider Office Phone # Fax #    Sabrina Dunlap [phone]       [address] [facility] [address]        Equal Access to Services     COBY MAE : Hadpartha dk jaquez aguilao Somarcela, waaxda luqadaha, qaybta kaalmada ebony, blair shirley laZigenevieve martinez. So [facility] [phone].    ATENCIÓN: Si habla español, tiene a delatorre disposición servicios gratuitos de asistencia lingüística. Llame al [phone].    We comply with applicable federal civil rights laws and [place] laws. We do not discriminate on the basis of race, color, national origin, age, disability, sex, sexual orientation, or gender identity.            Thank you!     Thank you for choosing [facility]  for your care. Our goal is always to provide you with excellent care. Hearing back from our patients is one way we can continue to improve our services. Please take a few minutes to complete the written survey that you may receive in the mail after your visit with us. Thank you!             Your Updated Medication List - Protect others around you: Learn how to safely use, store and throw away your medicines at www.disposemymeds.org.          This list is accurate as of 10/15/18  8:40 AM.  Always use your most recent med list.                   Brand Name Dispense Instructions for use Diagnosis    carbamide peroxide 6.5 % otic solution    DEBROX    15 mL    Use twice daily for 4 days prior to appt for ear cleaning.    Bilateral impacted cerumen       cetirizine 10 MG tablet    zyrTEC    90 tablet    Take 1 tablet (10 mg) by mouth daily    Chronic seasonal allergic rhinitis, unspecified trigger       citalopram 40 MG tablet    celeXA    90 tablet    Take 1 tablet (40 mg) by mouth daily    Major depression in complete remission (H)       donepezil 10 MG tablet    ARICEPT    90 tablet    Take 1 tablet (10 mg) by mouth At Bedtime    Mild dementia       ferrous sulfate 325 (65 Fe) MG tablet     IRON    90 tablet    Take 1 tablet (325 mg) by mouth daily (with breakfast)    Benign essential hypertension       * ibuprofen 200 MG capsule     120 capsule    One tablet every 4 to 6 hours as needed    Primary osteoarthritis involving multiple joints       * ibuprofen 200 MG capsule     120 capsule    Take 200-400 mg by mouth every 4 hours as needed for fever    Tension headache       multivitamin, therapeutic with minerals Tabs tablet     100 tablet    Take 1 tablet by mouth daily    Primary osteoarthritis involving multiple joints       psyllium 58.6 % Powd    METAMUCIL MULTIHEALTH FIBER    1 Bottle    Take 18 g (1 Tablespoonful) by mouth daily    Other constipation       STRATTERA 18 MG capsule   Generic drug:  atomoxetine      Take 54 mg by mouth every evening        triamcinolone 0.5 % cream    KENALOG     Apply topically 3 times daily as needed for irritation (duration of treatment: 7-14 days PRN red marks on arm)        vitamin D 2000 units tablet     100 tablet    Take 2,000 Units by mouth daily    Primary osteoarthritis involving multiple joints       * Notice:  This list has 2 medication(s) that are the same as other medications prescribed for you. Read the directions carefully, and ask your doctor or other care provider to review them with you.

## 2018-10-17 ENCOUNTER — TELEPHONE (OUTPATIENT)
Dept: INTERNAL MEDICINE | Facility: CLINIC | Age: 58
End: 2018-10-17

## 2018-10-17 NOTE — TELEPHONE ENCOUNTER
Panel Management Review      Patient has the following on her problem list: None      Composite cancer screening  Chart review shows that this patient is due/due soon for the following Pap Smear  Summary:    Patient is due/failing the following:   PAP    Action needed:   Patient needs office visit for PAP screening.    Type of outreach:    Sent letter.    Questions for provider review:    None                                                                                                                                    Evonne ARELLANO       Chart routed to Evonne ARELLANO

## 2018-10-17 NOTE — LETTER
[facility]  [address], [address]  [place], [place]  [address]                                            TEL:[phone]  FAX:[phone]      Valentine Vu  [address]  [place] [address]      October 17, 2018    Dear Valentine,         At [facility] we care about your health and well-being.  A review of your chart has indicated that you are due for a PAP screening.  Please contact us at [phone] to schedule an appointment.    If you have already had one or all of the above screening tests at another facility, please call us to update your chart.       Sincerely,      Sabrina Dunlap M.D.

## 2018-11-02 ENCOUNTER — OFFICE VISIT (OUTPATIENT)
Dept: CARDIOLOGY | Facility: CLINIC | Age: 58
End: 2018-11-02
Attending: INTERNAL MEDICINE
Payer: MEDICARE

## 2018-11-02 VITALS
WEIGHT: 142 LBS | SYSTOLIC BLOOD PRESSURE: 118 MMHG | BODY MASS INDEX: 27.88 KG/M2 | HEIGHT: 60 IN | HEART RATE: 84 BPM | DIASTOLIC BLOOD PRESSURE: 80 MMHG

## 2018-11-02 DIAGNOSIS — I10 BENIGN ESSENTIAL HYPERTENSION: ICD-10-CM

## 2018-11-02 DIAGNOSIS — R55 SYNCOPE, UNSPECIFIED SYNCOPE TYPE: Primary | ICD-10-CM

## 2018-11-02 PROCEDURE — 99214 OFFICE O/P EST MOD 30 MIN: CPT | Performed by: INTERNAL MEDICINE

## 2018-11-02 RX ORDER — PHENOL 1.4 %
10 AEROSOL, SPRAY (ML) MUCOUS MEMBRANE
COMMUNITY

## 2018-11-02 NOTE — LETTER
11/2/2018    Sabrina Dunlap MD  [address] [facility] [address]    RE: Valentine Vu       Dear Colleague,    I had the pleasure of seeing Valentine Vu in the [facility].    11/02/2018 New patient for Dr. Sandoval. Pt with recent episodes of LOC.  5 episodes of loss of consciousness in the past 6 months. Lasting 2-3minutes. Can hear people but can't speak.  Uncertain if cardio or neuro etiology.  MRI brain unremarkable  Echo 09/29/2018- no abnormalities. Normal EF, no bubble study done. No PFO per color flow.  Ziopatch placed 10/3/2018.-results 3 episodes of SVT longest 9 beats ?ATach. PACs/PVCs isolated.    HPI and Plan:   See dictation            No orders of the defined types were placed in this encounter.    Orders Placed This Encounter   Medications     Melatonin 10 MG TABS tablet     Sig: Take 30 mg by mouth nightly as needed for sleep     There are no discontinued medications.      Encounter Diagnoses   Name Primary?     Syncope, unspecified syncope type Yes     Benign essential hypertension        CURRENT MEDICATIONS:  Current Outpatient Prescriptions   Medication Sig Dispense Refill     atomoxetine (STRATTERA) 18 MG capsule Take 54 mg by mouth every evening       carbamide peroxide (DEBROX) 6.5 % otic solution Use twice daily for 4 days prior to appt for ear cleaning. 15 mL 4     cetirizine (ZYRTEC) 10 MG tablet Take 1 tablet (10 mg) by mouth daily 90 tablet 4     Cholecalciferol (VITAMIN D) 2000 units tablet Take 2,000 Units by mouth daily 100 tablet 4     citalopram (CELEXA) 40 MG tablet Take 1 tablet (40 mg) by mouth daily 90 tablet 1     donepezil (ARICEPT) 10 MG tablet Take 1 tablet (10 mg) by mouth At Bedtime 90 tablet 4     ferrous sulfate (IRON) 325 (65 Fe) MG tablet Take 1 tablet (325 mg) by mouth daily (with breakfast) 90 tablet 4     ibuprofen 200 MG capsule Take 200-400 mg by mouth every 4 hours as needed for fever 120 capsule 0     ibuprofen 200 MG  "capsule One tablet every 4 to 6 hours as needed 120 capsule 4     Melatonin 10 MG TABS tablet Take 30 mg by mouth nightly as needed for sleep       multivitamin, therapeutic with minerals (MULTI-VITAMIN) TABS tablet Take 1 tablet by mouth daily 100 tablet 4     psyllium (METAMUCIL MULTIHEALTH FIBER) 58.6 % POWD Take 18 g (1 Tablespoonful) by mouth daily 1 Bottle 1     triamcinolone (KENALOG) 0.5 % cream Apply topically 3 times daily as needed for irritation (duration of treatment: 7-14 days PRN red marks on arm)          ALLERGIES   No Known Allergies    PAST MEDICAL HISTORY:  History reviewed. No pertinent past medical history.    PAST SURGICAL HISTORY:  History reviewed. No pertinent surgical history.    FAMILY HISTORY:  History reviewed. No pertinent family history.    SOCIAL HISTORY:  Social History     Social History     Marital status: Single     Spouse name: N/A     Number of children: N/A     Years of education: N/A     Social History Main Topics     Smoking status: Never Smoker     Smokeless tobacco: Never Used     Alcohol use No     Drug use: No     Sexual activity: No     Other Topics Concern     None     Social History Narrative         Review of Systems:  Skin:  Negative       Eyes:  Positive for glasses    ENT:  Negative      Respiratory:  Negative       Cardiovascular:  Negative      Gastroenterology: Negative      Genitourinary:  Positive for nocturia    Musculoskeletal:  Negative      Neurologic:  Positive for headaches increase in frequency lately  Psychiatric:  Positive for anxiety treated and for ADHD  Heme/Lymph/Imm:  Positive for allergies seasonal  Endocrine:  Negative        Physical Exam:  Vitals: /80  Pulse 84  Ht 1.53 m (5' 0.24\")  Wt 64.4 kg (142 lb)  BMI 27.52 kg/m2    Constitutional:           Skin:             Head:           Eyes:           Lymph:      ENT:           Neck:           Respiratory:            Cardiac:                                                       "     GI:           Extremities and Muscular Skeletal:                 Neurological:           Psych:           Recent Lab Results:  LIPID RESULTS:  Lab Results   Component Value Date    CHOL 157 08/03/2018    HDL 62 08/03/2018    LDL 79 08/03/2018    TRIG 79 08/03/2018       LIVER ENZYME RESULTS:  Lab Results   Component Value Date    AST 14 08/03/2018    ALT 27 08/03/2018       CBC RESULTS:  Lab Results   Component Value Date    WBC 7.2 09/28/2018    RBC 5.34 (H) 09/28/2018    HGB 15.1 09/28/2018    HCT 46.1 09/28/2018    MCV 86 09/28/2018    MCH 28.3 09/28/2018    MCHC 32.8 09/28/2018    RDW 14.1 09/28/2018     09/28/2018       BMP RESULTS:  Lab Results   Component Value Date     09/28/2018    POTASSIUM 4.0 09/28/2018    CHLORIDE 107 09/28/2018    CO2 30 09/28/2018    ANIONGAP 3 09/28/2018     (H) 09/28/2018    BUN 17 09/28/2018    CR 0.70 09/28/2018    GFRESTIMATED 86 09/28/2018    GFRESTBLACK >90 09/28/2018    [place] 8.5 09/28/2018        A1C RESULTS:  No results found for: A1C    INR RESULTS:  Lab Results   Component Value Date    INR 1.02 10/07/2010           CC  Sabrina Dunlap MD  [address]  [place] [phone]                    Thank you for allowing me to participate in the care of your patient.      Sincerely,     KYREE VARGAS MD     [facility]    cc:   Sabrina Dunlap MD  [address] [place] [address]

## 2018-11-02 NOTE — LETTER
11/2/2018      Sabrina Dunlap MD  [address] [facility] [address]      RE: Valentine Vu       Dear Colleague,    I had the pleasure of seeing Valentine Vu in the [facility].    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  Valentine is 57.  She lives in a group home due to longstanding mild personality and memory dysfunction.  She works part-time.  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  She works every day.      She has had 3 episodes, one in June and two in 09/2018 that have brought her to attention.  In June, she was standing for quite a period of time.  She says she felt a little lightheaded and nauseated and warm, and subsequently ended up slumping to the floor.  She did not hurt herself.  This was apparently witnessed.  She woke awakened somewhat confused and disoriented, but this was not evaluated further.      In September, she had been standing for a while and then was sitting down.  She had this sense that things just were not quite right.  She could not really respond.  She says she was warm.  She did not have chest pain, palpitations or strictly dizziness.      The second event occurred while she was sitting in a car.  Her sister was driving.  Then the sister looked over and noted that she had a glazed look to her eyes.  She hit her leg a couple of times trying to get her to respond and she did not do so.  Valentine says she remembers this event happening, but just could not respond.  She was taken to the emergency room, her blood sugars were 88.  EKG showed sinus rhythm and was normal.  Lab work was totally normal including CBC, complete metabolic profile, renal function, liver profile, cholesterol, etc.  Hemoglobin 15 grams, white count 7000.  TSH was normal at 2.2.  Troponin negative.      They did a resting echo the next day.  This study showed normal LV and RV systolic function.   There was mild mitral insufficiency.  The aortic valve was normal.  Right heart pressures were normal at 24 mmHg plus right atrial pressure.  The inferior vena cava was not dilated, supporting normal right atrial pressures.      She subsequently wore a ZIO Patch event recorder dated 10/17 this year.  This study showed primarily sinus rhythm.  She had one 4-beat run of supraventricular tachycardia that was faintly irregular of unclear etiology.  It is a PSVT for sure, but the exact mechanism from the 4 beats provided was not clear.  She had an average heart rate of 92 beats a minute, maximum heart rate of 162 and a minimum heart rate of 47.  Rare PVCs and rare PACs were noted.  Nothing in this would have explained the episodes noted above.      There was no reported tonic-clonic activity.  She had an MRI of the head and neck structures.  Angiography revealed normal vessels in the neck and the head and the normal brain anatomy.      PAST MEDICAL HISTORY:   1.  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   2.  Depression.   3.  Occasional hypertension.      MEDICATIONS:  As listed in Epic, none of which are particularly renowned for lowering blood pressure, but include Zyrtec, Calcium and vitamin D, Celexa, Aricept, oral iron, ibuprofen, melatonin, multivitamins and Metamucil.  She also takes Strattera 18 mg nightly.      Strattera reportedly has a very low incidence of potential seizure.  Hypotension is not particularly reported.      She is not on any blood pressure lowering agents per se.      SOCIAL HISTORY:  She does not smoke.  She has a rare alcoholic beverage.  She lives in a group home.  She lives a quietly normal life.  She is active, but does not exercise.      DRUG INTOLERANCES:  None are identified.      FAMILY HISTORY:  Noncontributory.      PHYSICAL EXAMINATION:   GENERAL:  Slightly built woman in no distress.  She is 5 feet tall, weighs 142 pounds.  She is faintly overweight.    VITAL SIGNS:  Blood pressure 118/80, heart rate 80 beats a minute and regular.   HEAD AND NECK:  Normal.  Carotids were equal, no bruits heard.   NECK:  No adenopathy noted.   HEART:  Regular without gallop, murmur, or rub.   LUNGS:  Clear to auscultation.   ABDOMEN:  Flat without organomegaly.   EXTREMITIES:  Free of edema, +2 radial and +2 pedal pulses are noted.        The first syncopal spell in June has an orthostatic kind of a flavor to it, but it was clearly not documented.       After the other 2 episodes that occurred while she was sitting, orthostasis seems less likely.  It should be noted her echo showed a rather small left ventricle measuring 3.0 cm.  She is a small person herself, but this small ventricular measurement is somewhat suggestive of volume depletion.  Subsequent to these episodes, she has been hydrating and drinking more which I supported wholeheartedly and even encouraged her to use a little bit of salt.      Hypoglycemia would seem to be a remote possibility.  A seizure disorder of the petit mal or less than grand mal seizure might be a consideration.  Appropriately, she is having a neurology consult next month.  Whether Neurology will buy into the idea of a possible seizure disorder remains to be seen.      Her event recorder did not support any obvious arrhythmia that would explain these episodes.      Should she have repeat episodes that we cannot get adequate monitoring I would favor consideration of placing a Reveal device to rule out cardiac arrhythmia.      One could try a tilt table study to promote and rule out dramatic orthostatic hypotension.  Currently, she does not have much to suggest that as a diagnosis per se.  I do think her first episode in June may well have been due to a complex combination of volume depletion, vasovagal reaction, etc.  All of this is conjecture without proof.      For the time being, I did not order more tests.  I was interested in hearing what  Neurology had to say.  I think aggressive hydration and being careful not to have often prolonged standing is prudent.  The fact that she had 2 episodes while she was sitting is quite remarkable.      IMPRESSION:   1.  Possibly hypotensive mechanism for at least one of these altered mental status episodes.  The other two are less clear.  No obvious cardiac etiology is defined on the surface.     2.  No clearcut rhythm issues appreciated.   3.  Relatively normal heart otherwise,  although the left ventricle is a bit small, perhaps supporting volume depletion.      We will help as we can subsequently.  We will see how things go.  I do not believe this is purely a cardiovascular issue.  It may well be associated with a component of hypotension and volume depletion, but time will tell.  We will follow her and help as possible.      cc:   Sabrina Dunlap MD   [facility]    [address] [place]  [address]         KYREE VARGAS MD, [facility]             D: 2018   T: 2018   MT: MARIFER      Name:     JOYCE CAMACHO   MRN:      [id number]        Account:      [id number]   :      1960           Service Date: 2018      Document: [id number]         Outpatient Encounter Prescriptions as of 2018   Medication Sig Dispense Refill     atomoxetine (STRATTERA) 18 MG capsule Take 54 mg by mouth every evening       carbamide peroxide (DEBROX) 6.5 % otic solution Use twice daily for 4 days prior to appt for ear cleaning. 15 mL 4     cetirizine (ZYRTEC) 10 MG tablet Take 1 tablet (10 mg) by mouth daily 90 tablet 4     Cholecalciferol (VITAMIN D) 2000 units tablet Take 2,000 Units by mouth daily 100 tablet 4     citalopram (CELEXA) 40 MG tablet Take 1 tablet (40 mg) by mouth daily 90 tablet 1     donepezil (ARICEPT) 10 MG tablet Take 1 tablet (10 mg) by mouth At Bedtime 90 tablet 4     ferrous sulfate (IRON) 325 (65 Fe) MG tablet Take 1 tablet (325 mg) by mouth daily (with  breakfast) 90 tablet 4     ibuprofen 200 MG capsule One tablet every 4 to 6 hours as needed 120 capsule 4     Melatonin 10 MG TABS tablet Take 30 mg by mouth nightly as needed for sleep       multivitamin, therapeutic with minerals (MULTI-VITAMIN) TABS tablet Take 1 tablet by mouth daily 100 tablet 4     psyllium (METAMUCIL MULTIHEALTH FIBER) 58.6 % POWD Take 18 g (1 Tablespoonful) by mouth daily 1 Bottle 1     triamcinolone (KENALOG) 0.5 % cream Apply topically 3 times daily as needed for irritation (duration of treatment: 7-14 days PRN red marks on arm)        [DISCONTINUED] ibuprofen 200 MG capsule Take 200-400 mg by mouth every 4 hours as needed for fever 120 capsule 0     No facility-administered encounter medications on file as of 11/2/2018.        Again, thank you for allowing me to participate in the care of your patient.      Sincerely,    KYREE VARGAS MD     [facility]

## 2018-11-02 NOTE — MR AVS SNAPSHOT
"              After Visit Summary   11/2/2018    Valentine Vu    MRN: [id number]           Patient Information     Date Of Birth          1960        Visit Information        Provider Department      11/2/2018 11:15 AM Tello Sandoval MD [facility]        Today's Diagnoses     Syncope, unspecified syncope type    -  1    Benign essential hypertension           Follow-ups after your visit        Your next 10 appointments already scheduled     Nov 07, 2018  9:40 AM CST   [facility]    [address]  [facility] [id number] [phone]              Who to contact     If you have questions or need follow up information about today's clinic visit or your schedule please contact [facility] directly at [phone].  Normal or non-critical lab and imaging results will be communicated to you by MyChart, letter or phone within 4 business days after the clinic has received the results. If you do not hear from us within 7 days, please contact the clinic through MyChart or phone. If you have a critical or abnormal lab result, we will notify you by phone as soon as possible.  Submit refill requests through [employer] or call your pharmacy and they will forward the refill request to us. Please allow 3 business days for your refill to be completed.          Additional Information About Your Visit        Care EveryWhere ID     This is your Care EveryWhere ID. This could be used by other organizations to access your [place] medical records  [id number]        Your Vitals Were     Pulse Height BMI (Body Mass Index)             84 1.53 m (5' 0.24\") 27.52 kg/m2          Blood Pressure from Last 3 Encounters:   11/02/18 118/80   10/15/18 116/74   09/29/18 131/68    Weight from Last 3 Encounters:   11/02/18 64.4 kg (142 lb)   10/15/18 64.7 kg (142 lb " 9.6 oz)   09/29/18 65.8 kg (145 lb)              Today, you had the following     No orders found for display       Primary Care Provider Office Phone # Fax #    Sabrina Dunlap [phone]       [address]  [facility] [address]        Equal Access to Services     [facility] : Hadii aad ku hadasho Soomaali, waaxda luqadaha, qaybta kaalmada adeegyada, waxay idiin hayaan adeeg robertosh la'aan . So [facility] [phone].    ATENCIÓN: Si habla español, tiene a delatorre disposición servicios gratuitos de asistencia lingüística. Llame al [phone].    We comply with applicable federal civil rights laws and [place] laws. We do not discriminate on the basis of race, color, national origin, age, disability, sex, sexual orientation, or gender identity.            Thank you!     Thank you for choosing [facility]  for your care. Our goal is always to provide you with excellent care. Hearing back from our patients is one way we can continue to improve our services. Please take a few minutes to complete the written survey that you may receive in the mail after your visit with us. Thank you!             Your Updated Medication List - Protect others around you: Learn how to safely use, store and throw away your medicines at www.disposemymeds.org.          This list is accurate as of 11/2/18 12:24 PM.  Always use your most recent med list.                   Brand Name Dispense Instructions for use Diagnosis    carbamide peroxide 6.5 % otic solution    DEBROX    15 mL    Use twice daily for 4 days prior to appt for ear cleaning.    Bilateral impacted cerumen       cetirizine 10 MG tablet    zyrTEC    90 tablet    Take 1 tablet (10 mg) by mouth daily    Chronic seasonal allergic rhinitis, unspecified trigger       citalopram 40 MG tablet    celeXA    90 tablet    Take 1 tablet (40 mg) by mouth daily    Major depression in complete remission (H)       donepezil 10 MG  tablet    ARICEPT    90 tablet    Take 1 tablet (10 mg) by mouth At Bedtime    Mild dementia       ferrous sulfate 325 (65 Fe) MG tablet    IRON    90 tablet    Take 1 tablet (325 mg) by mouth daily (with breakfast)    Benign essential hypertension       * ibuprofen 200 MG capsule     120 capsule    One tablet every 4 to 6 hours as needed    Primary osteoarthritis involving multiple joints       * ibuprofen 200 MG capsule     120 capsule    Take 200-400 mg by mouth every 4 hours as needed for fever    Tension headache       Melatonin 10 MG Tabs tablet      Take 30 mg by mouth nightly as needed for sleep        multivitamin, therapeutic with minerals Tabs tablet     100 tablet    Take 1 tablet by mouth daily    Primary osteoarthritis involving multiple joints       psyllium 58.6 % Powd    METAMUCIL MULTIHEALTH FIBER    1 Bottle    Take 18 g (1 Tablespoonful) by mouth daily    Other constipation       STRATTERA 18 MG capsule   Generic drug:  atomoxetine      Take 54 mg by mouth every evening        triamcinolone 0.5 % cream    KENALOG     Apply topically 3 times daily as needed for irritation (duration of treatment: 7-14 days PRN red marks on arm)        vitamin D 2000 units tablet     100 tablet    Take 2,000 Units by mouth daily    Primary osteoarthritis involving multiple joints       * Notice:  This list has 2 medication(s) that are the same as other medications prescribed for you. Read the directions carefully, and ask your doctor or other care provider to review them with you.

## 2018-11-02 NOTE — PROGRESS NOTES
11/02/2018 New patient for Dr. Sandoval. Pt with recent episodes of LOC.  5 episodes of loss of consciousness in the past 6 months. Lasting 2-3minutes. Can hear people but can't speak.  Uncertain if cardio or neuro etiology.  MRI brain unremarkable  Echo 09/29/2018- no abnormalities. Normal EF, no bubble study done. No PFO per color flow.  Ziopatch placed 10/3/2018.-results 3 episodes of SVT longest 9 beats ?ATach. PACs/PVCs isolated.    HPI and Plan:   See dictation            No orders of the defined types were placed in this encounter.    Orders Placed This Encounter   Medications     Melatonin 10 MG TABS tablet     Sig: Take 30 mg by mouth nightly as needed for sleep     There are no discontinued medications.      Encounter Diagnoses   Name Primary?     Syncope, unspecified syncope type Yes     Benign essential hypertension        CURRENT MEDICATIONS:  Current Outpatient Prescriptions   Medication Sig Dispense Refill     atomoxetine (STRATTERA) 18 MG capsule Take 54 mg by mouth every evening       carbamide peroxide (DEBROX) 6.5 % otic solution Use twice daily for 4 days prior to appt for ear cleaning. 15 mL 4     cetirizine (ZYRTEC) 10 MG tablet Take 1 tablet (10 mg) by mouth daily 90 tablet 4     Cholecalciferol (VITAMIN D) 2000 units tablet Take 2,000 Units by mouth daily 100 tablet 4     citalopram (CELEXA) 40 MG tablet Take 1 tablet (40 mg) by mouth daily 90 tablet 1     donepezil (ARICEPT) 10 MG tablet Take 1 tablet (10 mg) by mouth At Bedtime 90 tablet 4     ferrous sulfate (IRON) 325 (65 Fe) MG tablet Take 1 tablet (325 mg) by mouth daily (with breakfast) 90 tablet 4     ibuprofen 200 MG capsule Take 200-400 mg by mouth every 4 hours as needed for fever 120 capsule 0     ibuprofen 200 MG capsule One tablet every 4 to 6 hours as needed 120 capsule 4     Melatonin 10 MG TABS tablet Take 30 mg by mouth nightly as needed for sleep       multivitamin, therapeutic with minerals (MULTI-VITAMIN) TABS tablet Take 1  "tablet by mouth daily 100 tablet 4     psyllium (METAMUCIL MULTIHEALTH FIBER) 58.6 % POWD Take 18 g (1 Tablespoonful) by mouth daily 1 Bottle 1     triamcinolone (KENALOG) 0.5 % cream Apply topically 3 times daily as needed for irritation (duration of treatment: 7-14 days PRN red marks on arm)          ALLERGIES   No Known Allergies    PAST MEDICAL HISTORY:  History reviewed. No pertinent past medical history.    PAST SURGICAL HISTORY:  History reviewed. No pertinent surgical history.    FAMILY HISTORY:  History reviewed. No pertinent family history.    SOCIAL HISTORY:  Social History     Social History     Marital status: Single     Spouse name: N/A     Number of children: N/A     Years of education: N/A     Social History Main Topics     Smoking status: Never Smoker     Smokeless tobacco: Never Used     Alcohol use No     Drug use: No     Sexual activity: No     Other Topics Concern     None     Social History Narrative         Review of Systems:  Skin:  Negative       Eyes:  Positive for glasses    ENT:  Negative      Respiratory:  Negative       Cardiovascular:  Negative      Gastroenterology: Negative      Genitourinary:  Positive for nocturia    Musculoskeletal:  Negative      Neurologic:  Positive for headaches increase in frequency lately  Psychiatric:  Positive for anxiety treated and for ADHD  Heme/Lymph/Imm:  Positive for allergies seasonal  Endocrine:  Negative        Physical Exam:  Vitals: /80  Pulse 84  Ht 1.53 m (5' 0.24\")  Wt 64.4 kg (142 lb)  BMI 27.52 kg/m2    Constitutional:           Skin:             Head:           Eyes:           Lymph:      ENT:           Neck:           Respiratory:            Cardiac:                                                           GI:           Extremities and Muscular Skeletal:                 Neurological:           Psych:           Recent Lab Results:  LIPID RESULTS:  Lab Results   Component Value Date    CHOL 157 08/03/2018    HDL 62 08/03/2018    " LDL 79 08/03/2018    TRIG 79 08/03/2018       LIVER ENZYME RESULTS:  Lab Results   Component Value Date    AST 14 08/03/2018    ALT 27 08/03/2018       CBC RESULTS:  Lab Results   Component Value Date    WBC 7.2 09/28/2018    RBC 5.34 (H) 09/28/2018    HGB 15.1 09/28/2018    HCT 46.1 09/28/2018    MCV 86 09/28/2018    MCH 28.3 09/28/2018    MCHC 32.8 09/28/2018    RDW 14.1 09/28/2018     09/28/2018       BMP RESULTS:  Lab Results   Component Value Date     09/28/2018    POTASSIUM 4.0 09/28/2018    CHLORIDE 107 09/28/2018    CO2 30 09/28/2018    ANIONGAP 3 09/28/2018     (H) 09/28/2018    BUN 17 09/28/2018    CR 0.70 09/28/2018    GFRESTIMATED 86 09/28/2018    GFRESTBLACK >90 09/28/2018    OR[place]O 8.5 09/28/2018        A1C RESULTS:  No results found for: A1C    INR RESULTS:  Lab Results   Component Value Date    INR 1.02 10/07/2010           CC  Sabrina Dunlap MD  [address] [place] [address]

## 2018-11-07 ENCOUNTER — OFFICE VISIT (OUTPATIENT)
Dept: INTERNAL MEDICINE | Facility: CLINIC | Age: 58
End: 2018-11-07
Payer: MEDICARE

## 2018-11-07 VITALS
OXYGEN SATURATION: 96 % | DIASTOLIC BLOOD PRESSURE: 78 MMHG | HEIGHT: 60 IN | HEART RATE: 84 BPM | WEIGHT: 141 LBS | SYSTOLIC BLOOD PRESSURE: 128 MMHG | BODY MASS INDEX: 27.68 KG/M2 | TEMPERATURE: 98.2 F

## 2018-11-07 DIAGNOSIS — Z12.4 CERVICAL CANCER SCREENING: Primary | ICD-10-CM

## 2018-11-07 NOTE — MR AVS SNAPSHOT
After Visit Summary   11/7/2018    Valentine Vu    MRN: [id number]           Patient Information     Date Of Birth          1960        Visit Information        Provider Department      11/7/2018 9:40 AM Sabrina Dunlap MD [facility]        Today's Diagnoses     Cervical cancer screening    -  1       Follow-ups after your visit        Additional Services     OB/GYN REFERRAL       Your provider has referred you to:  [facility]: [facility] [phone]   [email or web address]      Please be aware that coverage of these services is subject to the terms and limitations of your health insurance plan.  Call member services at your health plan with any benefit or coverage questions.      Please bring the following with you to your appointment:    (1) Any X-Rays, CTs or MRIs which have been performed.  Contact the facility where they were done to arrange for  prior to your scheduled appointment.   (2) List of current medications   (3) This referral request   (4) Any documents/labs given to you for this referral                  Who to contact     If you have questions or need follow up information about today's clinic visit or your schedule please contact [facility] directly at [phone].  Normal or non-critical lab and imaging results will be communicated to you by MyChart, letter or phone within 4 business days after the clinic has received the results. If you do not hear from us within 7 days, please contact the clinic through MyChart or phone. If you have a critical or abnormal lab result, we will notify you by phone as soon as possible.  Submit refill requests through [employer] or call your pharmacy and they will forward the refill request to us. Please allow 3 business days for your refill to be completed.          Additional Information About Your Visit        Care EveryWhere ID     This is your Care  "EveryWhere ID. This could be used by other organizations to access your [facility] medical records  [id number]        Your Vitals Were     Pulse Temperature Height Pulse Oximetry Breastfeeding? BMI (Body Mass Index)    84 98.2  F (36.8  C) (Oral) 5' 0.25\" (1.53 m) 96% No 27.31 kg/m2       Blood Pressure from Last 3 Encounters:   11/07/18 128/78   11/02/18 118/80   10/15/18 116/74    Weight from Last 3 Encounters:   11/07/18 141 lb (64 kg)   11/02/18 142 lb (64.4 kg)   10/15/18 142 lb 9.6 oz (64.7 kg)              We Performed the Following     OB/GYN REFERRAL          Today's Medication Changes          These changes are accurate as of 11/7/18 10:40 AM.  If you have any questions, ask your nurse or doctor.               These medicines have changed or have updated prescriptions.        Dose/Directions    ibuprofen 200 MG capsule   This may have changed:  Another medication with the same name was removed. Continue taking this medication, and follow the directions you see here.   Used for:  Primary osteoarthritis involving multiple joints   Changed by:  Sabrina Dunlap MD        One tablet every 4 to 6 hours as needed   Quantity:  120 capsule   Refills:  4                Primary Care Provider Office Phone # Fax #    Sabrina Dunlap [phone]       [address] [place] [address]        Equal Access to Services     [facility] CARMELITA AH: Hadii dk jaquez hadtereo Somarcela, waaxda luqadaha, qaybta kaalmada ebony, blair taylor . So [facility] [phone].    ATENCIÓN: Si habla español, tiene a delatorre disposición servicios gratuitos de asistencia lingüística. Llame al [phone].    We comply with applicable federal civil rights laws and [place] laws. We do not discriminate on the basis of race, color, national origin, age, disability, sex, sexual orientation, or gender identity.            Thank you!     Thank you for choosing [facility]  for your care. " Our goal is always to provide you with excellent care. Hearing back from our patients is one way we can continue to improve our services. Please take a few minutes to complete the written survey that you may receive in the mail after your visit with us. Thank you!             Your Updated Medication List - Protect others around you: Learn how to safely use, store and throw away your medicines at www.disposemymeds.org.          This list is accurate as of 11/7/18 10:40 AM.  Always use your most recent med list.                   Brand Name Dispense Instructions for use Diagnosis    carbamide peroxide 6.5 % otic solution    DEBROX    15 mL    Use twice daily for 4 days prior to appt for ear cleaning.    Bilateral impacted cerumen       cetirizine 10 MG tablet    zyrTEC    90 tablet    Take 1 tablet (10 mg) by mouth daily    Chronic seasonal allergic rhinitis, unspecified trigger       citalopram 40 MG tablet    celeXA    90 tablet    Take 1 tablet (40 mg) by mouth daily    Major depression in complete remission (H)       donepezil 10 MG tablet    ARICEPT    90 tablet    Take 1 tablet (10 mg) by mouth At Bedtime    Mild dementia       ferrous sulfate 325 (65 Fe) MG tablet    IRON    90 tablet    Take 1 tablet (325 mg) by mouth daily (with breakfast)    Benign essential hypertension       ibuprofen 200 MG capsule     120 capsule    One tablet every 4 to 6 hours as needed    Primary osteoarthritis involving multiple joints       Melatonin 10 MG Tabs tablet      Take 30 mg by mouth nightly as needed for sleep        multivitamin, therapeutic with minerals Tabs tablet     100 tablet    Take 1 tablet by mouth daily    Primary osteoarthritis involving multiple joints       psyllium 58.6 % Powd    METAMUCIL MULTIHEALTH FIBER    1 Bottle    Take 18 g (1 Tablespoonful) by mouth daily    Other constipation       STRATTERA 18 MG capsule   Generic drug:  atomoxetine      Take 54 mg by mouth every evening        triamcinolone 0.5 %  cream    KENALOG     Apply topically 3 times daily as needed for irritation (duration of treatment: 7-14 days PRN red marks on arm)        vitamin D 2000 units tablet     100 tablet    Take 2,000 Units by mouth daily    Primary osteoarthritis involving multiple joints

## 2018-11-07 NOTE — NURSING NOTE
"/78  Pulse 84  Temp 98.2  F (36.8  C) (Oral)  Ht 5' 0.25\" (1.53 m)  Wt 141 lb (64 kg)  SpO2 96%  Breastfeeding? No  BMI 27.31 kg/m2    "

## 2018-11-12 ENCOUNTER — TELEPHONE (OUTPATIENT)
Dept: INTERNAL MEDICINE | Facility: CLINIC | Age: 58
End: 2018-11-12

## 2018-11-12 DIAGNOSIS — R55 SYNCOPE, UNSPECIFIED SYNCOPE TYPE: Primary | ICD-10-CM

## 2018-11-12 NOTE — TELEPHONE ENCOUNTER
Spoke to group home staff member and told her we can try calling [facility] tomorrow morning to see if they have anything sooner. She noted that the episodes consisted of pt being clammy, sweaty, and non responsive-as reported to this staff member who wasn't present at the time it occurred.

## 2018-11-12 NOTE — TELEPHONE ENCOUNTER
Pt's  (Jocelyn) called to leave a msg for pcp ([facility]).     Pt was seen recently, was given Neurology Referral. Made an appt on 12/13, but pt have been having more 'episodes' lately. Was wondering if there were other neurology or if pcp can call the pt back asap.     Jocelyn's Cellphone - [phone]     Detail Msg - Yes

## 2018-12-04 ENCOUNTER — TRANSFERRED RECORDS (OUTPATIENT)
Dept: HEALTH INFORMATION MANAGEMENT | Facility: CLINIC | Age: 58
End: 2018-12-04

## 2018-12-10 ENCOUNTER — TRANSFERRED RECORDS (OUTPATIENT)
Dept: HEALTH INFORMATION MANAGEMENT | Facility: CLINIC | Age: 58
End: 2018-12-10

## 2018-12-14 ENCOUNTER — TELEPHONE (OUTPATIENT)
Dept: INTERNAL MEDICINE | Facility: CLINIC | Age: 58
End: 2018-12-14

## 2018-12-14 DIAGNOSIS — H57.9 ITCHY EYES: Primary | ICD-10-CM

## 2018-12-14 RX ORDER — TETRAHYDROZOLINE HCL 0.05 %
1 DROPS OPHTHALMIC (EYE) 3 TIMES DAILY PRN
Qty: 15 ML | Refills: 0 | Status: SHIPPED | OUTPATIENT
Start: 2018-12-14 | End: 2019-02-22

## 2018-12-14 NOTE — TELEPHONE ENCOUNTER
Contacted Jocelyn to clarify request.   Patient will be flying to [place] and is only taking a carry-on bag for her vacation. Group home requesting order stating to discontinue fiber from 12/23-12/29 because she cannot take it with her on the flight.     Patient is also complaining of itchy eyes, requesting order for Visine eye drops as needed.     Fax orders to [phone].

## 2018-12-14 NOTE — LETTER
To whom it may concern:  Valentine Vu is a patient under my care.  Okay to discontinue fiber from 12/23-12/29.    Please call with any questions.         Sabrina Dunlap MD

## 2018-12-14 NOTE — TELEPHONE ENCOUNTER
Reason for Call:  Other prescription    Detailed comments: Jocelyn, the manager of [facility] for pt. Called and pt. Is going on trip 12/21-12/29.  Manager needs for  To send over some type of documentation saying that it is ok for pt. Not to take fiber on trip.  She also need her to say if pt. Needs visine     Phone Number Patient can be reached at: Home number on file [phone] (home) Jocelyn or anyone at [facility]     Best Time: asap    Can we leave a detailed message on this number? NO    Call taken on 12/14/2018 at 12:29 PM by Minerva Jiménez

## 2019-01-04 ENCOUNTER — OFFICE VISIT (OUTPATIENT)
Dept: INTERNAL MEDICINE | Facility: CLINIC | Age: 59
End: 2019-01-04
Payer: MEDICARE

## 2019-01-04 VITALS
OXYGEN SATURATION: 100 % | TEMPERATURE: 98 F | DIASTOLIC BLOOD PRESSURE: 70 MMHG | HEART RATE: 83 BPM | SYSTOLIC BLOOD PRESSURE: 120 MMHG | RESPIRATION RATE: 12 BRPM | BODY MASS INDEX: 28.07 KG/M2 | HEIGHT: 60 IN | WEIGHT: 143 LBS

## 2019-01-04 DIAGNOSIS — R55 SYNCOPE, UNSPECIFIED SYNCOPE TYPE: ICD-10-CM

## 2019-01-04 DIAGNOSIS — R06.83 SNORING: Primary | ICD-10-CM

## 2019-01-04 DIAGNOSIS — I10 BENIGN ESSENTIAL HYPERTENSION: ICD-10-CM

## 2019-01-04 DIAGNOSIS — F32.5 MAJOR DEPRESSION IN COMPLETE REMISSION (H): ICD-10-CM

## 2019-01-04 PROCEDURE — 99214 OFFICE O/P EST MOD 30 MIN: CPT | Performed by: INTERNAL MEDICINE

## 2019-01-04 ASSESSMENT — MIFFLIN-ST. JEOR: SCORE: 1149.11

## 2019-01-04 ASSESSMENT — PATIENT HEALTH QUESTIONNAIRE - PHQ9: SUM OF ALL RESPONSES TO PHQ QUESTIONS 1-9: 0

## 2019-01-04 NOTE — PATIENT INSTRUCTIONS
shingrix- new shingles vaccines    Switch Strattera to morning dose    Sleep referral    Check blood pressure if you have an episode.    Follow up with neurology for dementia and for these episodes.

## 2019-01-04 NOTE — NURSING NOTE
"/70   Pulse 83   Temp 98  F (36.7  C) (Oral)   Resp 12   Ht 1.53 m (5' 0.25\")   Wt 64.9 kg (143 lb)   SpO2 100%   BMI 27.70 kg/m      "

## 2019-01-04 NOTE — PROGRESS NOTES
"  SUBJECTIVE:   Valentine Vu is a 58 year old   female who presents to clinic today for the following health issues:    Caretaker with patient today.     Syncopal episodes.  Thanksgiving was last episode with staff.  She has seen cardiology and neurology. The patient reports that she is still having episodes of hot/clammy and then she is incoherent for a couple of minutes.  Denies any pain. No seizure-like activity. Not biting tongue or losing bowels.  Cardiology has thought this may be a hypotenstve episode.  She had a walking EEG, which was within normal limits.    Neurology would consider a trial of anti-seizure medication if episodes continued to happen.    Depression. PHQ9 of 0 today.  She is taking celexa, and has been on this medication for many years.     Dementia.  She has had this for approximately 7 years. She is on aricept.    She has seen neurology.    Snoring.  She does snore.  She feels tired during the day.  She wakes abruptly at night at times.          Problem list and histories reviewed & adjusted, as indicated.      Reviewed and updated as needed this visit by clinical staff  Tobacco  Allergies  Meds  Med Hx  Surg Hx  Fam Hx  Soc Hx      Reviewed and updated as needed this visit by Provider         ROS:  CONSTITUTIONAL: NEGATIVE for fever, chills, change in weight  RESP: NEGATIVE for significant cough or SOB  CV: NEGATIVE for chest pain, palpitations or peripheral edema  Psych: POS depression     OBJECTIVE:     /70   Pulse 83   Temp 98  F (36.7  C) (Oral)   Resp 12   Ht 1.53 m (5' 0.25\")   Wt 64.9 kg (143 lb)   SpO2 100%   BMI 27.70 kg/m    Body mass index is 27.7 kg/m .  GENERAL: healthy, alert and no distress  RESP: lungs clear to auscultation - no rales, rhonchi or wheezes  CV: regular rate and rhythm, normal S1 S2, no S3 or S4, no murmur, click or rub      ASSESSMENT/PLAN:     (R06.83) Snoring  (primary encounter diagnosis)  Comment: assess for ERUM  Plan: SLEEP EVALUATION & " MANAGEMENT REFERRAL - ADULT         -[facility] [place]          [phone] (Age 18 and up)            (F32.5) Major depression in complete remission (H)  Comment: depression stable  Plan: celexa    (R55) Syncope, unspecified syncope type  Comment:   Plan: patient to monitor blood pressure if episode occurs  -patient to take strattera in the morning rather than night  -consider celexa or aricept causing episodes- would need to discuss with pharmacy  Pt to return to neurology for possible anti seizure medication    Sabrina Dunlap MD  [facility]

## 2019-01-09 ENCOUNTER — TRANSFERRED RECORDS (OUTPATIENT)
Dept: HEALTH INFORMATION MANAGEMENT | Facility: CLINIC | Age: 59
End: 2019-01-09

## 2019-01-29 ENCOUNTER — TELEPHONE (OUTPATIENT)
Dept: INTERNAL MEDICINE | Facility: CLINIC | Age: 59
End: 2019-01-29

## 2019-02-12 ENCOUNTER — TELEPHONE (OUTPATIENT)
Dept: INTERNAL MEDICINE | Facility: CLINIC | Age: 59
End: 2019-02-12

## 2019-02-12 NOTE — LETTER
[facility]  [address], [address]  [place], [place]  [address]                                            TEL:[phone]  FAX:[phone]        Valentine Vu  [address] [place] [address]      February 12, 2019    Dear Valentine,    At [facility] we care about your health and well-being.  A review of your chart has indicated that you are due for a PAP smear.  Please contact us at [phone] to schedule an appointment with OB/GYN.            Sincerely,      Sabrina Dunlap M.D.

## 2019-02-12 NOTE — TELEPHONE ENCOUNTER
Panel Management Review      Patient has the following on her problem list:     Depression / Dysthymia review    Measure:  Needs PHQ-9 score of 4 or less during index window.  Administer PHQ-9 and if score is 5 or more, send encounter to provider for next steps.    5 - 7 month window range: utd    PHQ-9 SCORE 8/3/2018 1/4/2019   PHQ-9 Total Score 1 0       If PHQ-9 recheck is 5 or more, route to provider for next steps.    Patient is due for:  None    Hypertension   Last three blood pressure readings:  BP Readings from Last 3 Encounters:   01/04/19 120/70   11/07/18 128/78   11/02/18 118/80     Blood pressure: Passed    HTN Guidelines:  Age 18-59 BP range:  Less than 140/90  Age 60-85 with Diabetes:  Less than 140/90  Age 60-85 without Diabetes:  less than 150/90      Composite cancer screening  Chart review shows that this patient is due/due soon for the following Pap Smear  Summary:    Patient is due/failing the following:   PAP    Action needed:   Patient needs office visit for PAP.    Type of outreach:    Sent letter., PCP referred her to OB/GYN in November 2018, no future appt yet    Questions for provider review:    None                                                                                                                                    Joi Day CMA       Chart routed to no one .

## 2019-02-22 ENCOUNTER — OFFICE VISIT (OUTPATIENT)
Dept: OBGYN | Facility: CLINIC | Age: 59
End: 2019-02-22
Payer: MEDICARE

## 2019-02-22 ENCOUNTER — HOSPITAL ENCOUNTER (OUTPATIENT)
Dept: MAMMOGRAPHY | Facility: CLINIC | Age: 59
Discharge: HOME | End: 2019-02-22
Attending: INTERNAL MEDICINE | Admitting: INTERNAL MEDICINE
Payer: MEDICARE

## 2019-02-22 VITALS
HEIGHT: 60 IN | BODY MASS INDEX: 27.48 KG/M2 | WEIGHT: 140 LBS | SYSTOLIC BLOOD PRESSURE: 118 MMHG | DIASTOLIC BLOOD PRESSURE: 64 MMHG

## 2019-02-22 DIAGNOSIS — Z12.39 BREAST SCREENING: ICD-10-CM

## 2019-02-22 DIAGNOSIS — Z12.4 SCREENING FOR MALIGNANT NEOPLASM OF CERVIX: Primary | ICD-10-CM

## 2019-02-22 PROCEDURE — 77063 BREAST TOMOSYNTHESIS BI: CPT

## 2019-02-22 PROCEDURE — G0476 HPV COMBO ASSAY CA SCREEN: HCPCS | Performed by: ADVANCED PRACTICE MIDWIFE

## 2019-02-22 PROCEDURE — G0145 SCR C/V CYTO,THINLAYER,RESCR: HCPCS | Performed by: ADVANCED PRACTICE MIDWIFE

## 2019-02-22 PROCEDURE — 99201 ZZC OFFICE/OUTPT VISIT, NEW, LEVEL I: CPT | Performed by: ADVANCED PRACTICE MIDWIFE

## 2019-02-22 ASSESSMENT — MIFFLIN-ST. JEOR: SCORE: 1135.51

## 2019-02-22 NOTE — PROGRESS NOTES
"S: Valentine Vu is her for routine health maintenance of a pap smear. She has no other concerns and is declining screening for HIV and Hep C.     O:/64 (BP Location: Left arm, Patient Position: Sitting, Cuff Size: Adult Regular)   Ht 1.53 m (5' 0.25\")   Wt 63.5 kg (140 lb)   LMP  (LMP Unknown)   BMI 27.12 kg/m    Review Of Systems  Skin: negative  Respiratory: No shortness of breath, dyspnea on exertion, cough, or hemoptysis  Cardiovascular: negative  Gastrointestinal: negative  Genitourinary: negative  Musculoskeletal: negative  PELVIC: vaginal mucosa pink, moist, mildly atrophic. Cervix nulliparous and without lesions or tenderness.    A:   Encounter Diagnosis   Name Primary?     Screening for malignant neoplasm of cervix Yes     P: Repeat pap pending results.   Return to clinic as needed or for annual exam.    Archana Valencia CNM on 2/22/2019 at 10:50 AM      "

## 2019-02-22 NOTE — LETTER
March 4, 2019    Valentine Vu  [address] [place] [address]    Dear MsAsherVu,  This letter is regarding your recent Pap smear (cervical cancer screening) and Human Papillomavirus (HPV) test.  We are happy to inform you that your Pap smear result is normal. Cervical cancer is closely linked with certain types of HPV. Your results showed no evidence of high-risk HPV.  Therefore we recommend you return in 3 years for your next pap smear.  You will still need to return to the clinic every year for an annual exam and other preventive tests.  If you have additional questions regarding this result, please call our registered nurse, Eula at [phone].  Sincerely,    Archana Valencia CNM/noreen

## 2019-02-22 NOTE — NURSING NOTE
"Chief Complaint   Patient presents with     Gyn Exam     Pap        Initial /64 (BP Location: Left arm, Patient Position: Sitting, Cuff Size: Adult Regular)   Ht 1.53 m (5' 0.25\")   Wt 63.5 kg (140 lb)   LMP  (LMP Unknown)   BMI 27.12 kg/m   Estimated body mass index is 27.12 kg/m  as calculated from the following:    Height as of this encounter: 1.53 m (5' 0.25\").    Weight as of this encounter: 63.5 kg (140 lb).  BP completed using cuff size: regular    Questioned patient about current smoking habits.  Pt. has never smoked.      No obstetric history on file.    The following HM Due: pap smear    Maurilio Valencia CMA                "

## 2019-02-25 ENCOUNTER — TELEPHONE (OUTPATIENT)
Dept: INTERNAL MEDICINE | Facility: CLINIC | Age: 59
End: 2019-02-25

## 2019-02-25 NOTE — TELEPHONE ENCOUNTER
Forms received from patient  Standing orders for OTC/PRN medications  Dr[facility]lap[facility] in [facility]   Call for  at , send copy to abstraction

## 2019-02-26 LAB
COPATH REPORT: NORMAL
PAP: NORMAL

## 2019-02-27 LAB
FINAL DIAGNOSIS: NORMAL
HPV HR 12 DNA CVX QL NAA+PROBE: NEGATIVE
HPV16 DNA SPEC QL NAA+PROBE: NEGATIVE
HPV18 DNA SPEC QL NAA+PROBE: NEGATIVE
SPECIMEN DESCRIPTION: NORMAL
SPECIMEN SOURCE CVX/VAG CYTO: NORMAL

## 2019-03-25 ENCOUNTER — TELEPHONE (OUTPATIENT)
Dept: INTERNAL MEDICINE | Facility: CLINIC | Age: 59
End: 2019-03-25

## 2019-03-25 NOTE — TELEPHONE ENCOUNTER
Group Home  Physician med orders  Dr. Dunlap's in basket   Call once complete to see what they want done with it: [phone]

## 2019-04-16 ENCOUNTER — OFFICE VISIT (OUTPATIENT)
Dept: INTERNAL MEDICINE | Facility: CLINIC | Age: 59
End: 2019-04-16
Payer: MEDICARE

## 2019-04-16 VITALS
SYSTOLIC BLOOD PRESSURE: 110 MMHG | HEART RATE: 68 BPM | BODY MASS INDEX: 25.93 KG/M2 | HEIGHT: 60 IN | DIASTOLIC BLOOD PRESSURE: 70 MMHG | TEMPERATURE: 97.7 F | RESPIRATION RATE: 16 BRPM | OXYGEN SATURATION: 99 % | WEIGHT: 132.1 LBS

## 2019-04-16 DIAGNOSIS — R11.0 NAUSEA: Primary | ICD-10-CM

## 2019-04-16 PROCEDURE — 99214 OFFICE O/P EST MOD 30 MIN: CPT | Performed by: NURSE PRACTITIONER

## 2019-04-16 RX ORDER — ONDANSETRON 4 MG/1
4 TABLET, ORALLY DISINTEGRATING ORAL EVERY 8 HOURS PRN
Qty: 20 TABLET | Refills: 0 | Status: SHIPPED | OUTPATIENT
Start: 2019-04-16 | End: 2019-04-21

## 2019-04-16 ASSESSMENT — MIFFLIN-ST. JEOR: SCORE: 1099.67

## 2019-04-16 NOTE — LETTER
+April 16, 2019      Valentine Vu  [address] [place] [address]        To Whom It May Concern:    Valentine Vu was seen in our clinic. She may return to work with the following: no restrictions on or about 4/18/19.      Sincerely,        Namrata Trevino NP

## 2019-04-16 NOTE — PROGRESS NOTES
SUBJECTIVE:   Valentine Vu is a 58 year old female who presents to clinic today for the following   health issues:      Gastrointestinal symptoms      Duration: 1 day    Description:  Nausea, vomiting x 1, lightheadedness    Intensity:  moderate    Accompanying signs and symptoms:  Diarrhea x 1    History  Previous {similar problem: no   Previous evaluation:  none    Aggravating factors: none    Alleviating factors: nothing    Other Therapies tried: clear liquids          Additional history: as documented    Reviewed  and updated as needed this visit by clinical staff  Tobacco  Allergies  Meds  Med Hx  Surg Hx  Fam Hx  Soc Hx        Reviewed and updated as needed this visit by Provider         Patient Active Problem List   Diagnosis     Major depression in complete remission (H)     Seasonal allergic rhinitis     Mild dementia     Benign essential hypertension     Syncope     History reviewed. No pertinent surgical history.    Social History     Tobacco Use     Smoking status: Never Smoker     Smokeless tobacco: Never Used   Substance Use Topics     Alcohol use: No     Family History   Problem Relation Age of Onset     Leukemia Brother      Unknown/Adopted No family hx of          Current Outpatient Medications   Medication Sig Dispense Refill     atomoxetine (STRATTERA) 18 MG capsule Take 54 mg by mouth every evening       carbamide peroxide (DEBROX) 6.5 % otic solution Use twice daily for 4 days prior to appt for ear cleaning. 15 mL 4     cetirizine (ZYRTEC) 10 MG tablet Take 1 tablet (10 mg) by mouth daily 90 tablet 4     Cholecalciferol (VITAMIN D) 2000 units tablet Take 2,000 Units by mouth daily 100 tablet 4     citalopram (CELEXA) 40 MG tablet Take 1 tablet (40 mg) by mouth daily 90 tablet 1     donepezil (ARICEPT) 10 MG tablet Take 1 tablet (10 mg) by mouth At Bedtime 90 tablet 4     ferrous sulfate (IRON) 325 (65 Fe) MG tablet Take 1 tablet (325 mg) by mouth daily (with breakfast) 90 tablet 4      "ibuprofen 200 MG capsule One tablet every 4 to 6 hours as needed 120 capsule 4     Melatonin 10 MG TABS tablet Take 10 mg by mouth nightly as needed for sleep        multivitamin, therapeutic with minerals (MULTI-VITAMIN) TABS tablet Take 1 tablet by mouth daily 100 tablet 4     ondansetron (ZOFRAN-ODT) 4 MG ODT tab Take 1 tablet (4 mg) by mouth every 8 hours as needed for nausea 20 tablet 0     psyllium (METAMUCIL MULTIHEALTH FIBER) 58.6 % POWD Take 18 g (1 Tablespoonful) by mouth daily 1 Bottle 1     BP Readings from Last 3 Encounters:   04/16/19 110/70   02/22/19 118/64   01/04/19 120/70    Wt Readings from Last 3 Encounters:   04/16/19 59.9 kg (132 lb 1.6 oz)   02/22/19 63.5 kg (140 lb)   01/04/19 64.9 kg (143 lb)                    ROS:  Constitutional, HEENT, cardiovascular, pulmonary, gi and gu systems are negative, except as otherwise noted.    OBJECTIVE:     /70 (BP Location: Right arm, Patient Position: Sitting, Cuff Size: Adult Regular)   Pulse 68   Temp 97.7  F (36.5  C) (Oral)   Resp 16   Ht 1.53 m (5' 0.25\")   Wt 59.9 kg (132 lb 1.6 oz)   LMP  (LMP Unknown)   SpO2 99%   BMI 25.59 kg/m    Body mass index is 25.59 kg/m .  GENERAL: healthy, alert and no distress  EYES: Eyes grossly normal to inspection, PERRL and conjunctivae and sclerae normal  HENT: both ears: occluded with wax, oropharynx clear and oral mucous membranes moist  NECK: no adenopathy, no asymmetry, masses, or scars and thyroid normal to palpation  RESP: lungs clear to auscultation - no rales, rhonchi or wheezes  CV: regular rate and rhythm, normal S1 S2, no S3 or S4, no murmur, click or rub, no peripheral edema and peripheral pulses strong  ABDOMEN: soft, nontender, no hepatosplenomegaly, no masses and bowel sounds normal        ASSESSMENT/PLAN:               ICD-10-CM    1. Nausea R11.0 ondansetron (ZOFRAN-ODT) 4 MG ODT tab       Clear liquid diet, AAT  Call if new sx      Namrata Trevino NP  [facility] [place]

## 2019-04-16 NOTE — NURSING NOTE
patient states that last night she started feeling light headed, has a headache, some nausea and vomiting with diarrhea, no temps.

## 2019-04-21 ENCOUNTER — HOSPITAL ENCOUNTER (OUTPATIENT)
Facility: CLINIC | Age: 59
Setting detail: OBSERVATION
Discharge: HOME | End: 2019-04-22
Attending: EMERGENCY MEDICINE | Admitting: HOSPITALIST
Payer: MEDICARE

## 2019-04-21 DIAGNOSIS — R55 SYNCOPE, UNSPECIFIED SYNCOPE TYPE: ICD-10-CM

## 2019-04-21 DIAGNOSIS — H61.23 BILATERAL IMPACTED CERUMEN: Primary | ICD-10-CM

## 2019-04-21 LAB
ALBUMIN UR-MCNC: NEGATIVE MG/DL
ANION GAP SERPL CALCULATED.3IONS-SCNC: 7 MMOL/L (ref 3–14)
APPEARANCE UR: CLEAR
BASOPHILS # BLD AUTO: 0.1 10E9/L (ref 0–0.2)
BASOPHILS NFR BLD AUTO: 0.7 %
BILIRUB UR QL STRIP: NEGATIVE
BUN SERPL-MCNC: 11 MG/DL (ref 7–30)
CALCIUM SERPL-MCNC: 9.2 MG/DL (ref 8.5–10.1)
CHLORIDE SERPL-SCNC: 104 MMOL/L (ref 94–109)
CO2 SERPL-SCNC: 27 MMOL/L (ref 20–32)
COLOR UR AUTO: YELLOW
CREAT SERPL-MCNC: 0.57 MG/DL (ref 0.52–1.04)
D DIMER PPP FEU-MCNC: 0.3 UG/ML FEU (ref 0–0.5)
DIFFERENTIAL METHOD BLD: ABNORMAL
EOSINOPHIL # BLD AUTO: 0 10E9/L (ref 0–0.7)
EOSINOPHIL NFR BLD AUTO: 0.2 %
ERYTHROCYTE [DISTWIDTH] IN BLOOD BY AUTOMATED COUNT: 13.7 % (ref 10–15)
GFR SERPL CREATININE-BSD FRML MDRD: >90 ML/MIN/{1.73_M2}
GLUCOSE SERPL-MCNC: 109 MG/DL (ref 70–99)
GLUCOSE UR STRIP-MCNC: 100 MG/DL
HCT VFR BLD AUTO: 46.1 % (ref 35–47)
HGB BLD-MCNC: 15.4 G/DL (ref 11.7–15.7)
HGB UR QL STRIP: NEGATIVE
HYALINE CASTS #/AREA URNS LPF: 1 /LPF (ref 0–2)
IMM GRANULOCYTES # BLD: 0.1 10E9/L (ref 0–0.4)
IMM GRANULOCYTES NFR BLD: 0.5 %
KETONES UR STRIP-MCNC: NEGATIVE MG/DL
LEUKOCYTE ESTERASE UR QL STRIP: ABNORMAL
LYMPHOCYTES # BLD AUTO: 1.2 10E9/L (ref 0.8–5.3)
LYMPHOCYTES NFR BLD AUTO: 12.2 %
MAGNESIUM SERPL-MCNC: 2.4 MG/DL (ref 1.6–2.3)
MCH RBC QN AUTO: 28.7 PG (ref 26.5–33)
MCHC RBC AUTO-ENTMCNC: 33.4 G/DL (ref 31.5–36.5)
MCV RBC AUTO: 86 FL (ref 78–100)
MONOCYTES # BLD AUTO: 0.8 10E9/L (ref 0–1.3)
MONOCYTES NFR BLD AUTO: 8.4 %
MUCOUS THREADS #/AREA URNS LPF: PRESENT /LPF
NEUTROPHILS # BLD AUTO: 7.8 10E9/L (ref 1.6–8.3)
NEUTROPHILS NFR BLD AUTO: 78 %
NITRATE UR QL: NEGATIVE
NRBC # BLD AUTO: 0 10*3/UL
NRBC BLD AUTO-RTO: 0 /100
PH UR STRIP: 6.5 PH (ref 5–7)
PLATELET # BLD AUTO: 394 10E9/L (ref 150–450)
POTASSIUM SERPL-SCNC: 3.7 MMOL/L (ref 3.4–5.3)
RBC # BLD AUTO: 5.36 10E12/L (ref 3.8–5.2)
RBC #/AREA URNS AUTO: 1 /HPF (ref 0–2)
SODIUM SERPL-SCNC: 138 MMOL/L (ref 133–144)
SOURCE: ABNORMAL
SP GR UR STRIP: 1.01 (ref 1–1.03)
SQUAMOUS #/AREA URNS AUTO: 1 /HPF (ref 0–1)
TROPONIN I SERPL-MCNC: <0.015 UG/L (ref 0–0.04)
UROBILINOGEN UR STRIP-MCNC: NORMAL MG/DL (ref 0–2)
WBC # BLD AUTO: 9.9 10E9/L (ref 4–11)
WBC #/AREA URNS AUTO: 11 /HPF (ref 0–5)

## 2019-04-21 PROCEDURE — 83835 ASSAY OF METANEPHRINES: CPT | Performed by: HOSPITALIST

## 2019-04-21 PROCEDURE — 87086 URINE CULTURE/COLONY COUNT: CPT | Performed by: EMERGENCY MEDICINE

## 2019-04-21 PROCEDURE — 96360 HYDRATION IV INFUSION INIT: CPT

## 2019-04-21 PROCEDURE — 83735 ASSAY OF MAGNESIUM: CPT | Performed by: EMERGENCY MEDICINE

## 2019-04-21 PROCEDURE — 81001 URINALYSIS AUTO W/SCOPE: CPT | Performed by: EMERGENCY MEDICINE

## 2019-04-21 PROCEDURE — 80048 BASIC METABOLIC PNL TOTAL CA: CPT | Performed by: EMERGENCY MEDICINE

## 2019-04-21 PROCEDURE — 99219 ZZC INITIAL OBSERVATION CARE,LEVL II: CPT | Performed by: HOSPITALIST

## 2019-04-21 PROCEDURE — 93005 ELECTROCARDIOGRAM TRACING: CPT

## 2019-04-21 PROCEDURE — 25000132 ZZH RX MED GY IP 250 OP 250 PS 637: Mod: GY | Performed by: HOSPITALIST

## 2019-04-21 PROCEDURE — 99285 EMERGENCY DEPT VISIT HI MDM: CPT | Mod: 25

## 2019-04-21 PROCEDURE — G0378 HOSPITAL OBSERVATION PER HR: HCPCS

## 2019-04-21 PROCEDURE — 99207 ZZC DOWN CODE DUE TO INITIAL EXAM: CPT | Performed by: HOSPITALIST

## 2019-04-21 PROCEDURE — 36415 COLL VENOUS BLD VENIPUNCTURE: CPT | Performed by: HOSPITALIST

## 2019-04-21 PROCEDURE — A9270 NON-COVERED ITEM OR SERVICE: HCPCS | Mod: GY | Performed by: PHYSICIAN ASSISTANT

## 2019-04-21 PROCEDURE — 84484 ASSAY OF TROPONIN QUANT: CPT | Performed by: EMERGENCY MEDICINE

## 2019-04-21 PROCEDURE — A9270 NON-COVERED ITEM OR SERVICE: HCPCS | Mod: GY | Performed by: HOSPITALIST

## 2019-04-21 PROCEDURE — 25000132 ZZH RX MED GY IP 250 OP 250 PS 637: Mod: GY | Performed by: PHYSICIAN ASSISTANT

## 2019-04-21 PROCEDURE — 85025 COMPLETE CBC W/AUTO DIFF WBC: CPT | Performed by: EMERGENCY MEDICINE

## 2019-04-21 PROCEDURE — 25000128 H RX IP 250 OP 636: Performed by: EMERGENCY MEDICINE

## 2019-04-21 PROCEDURE — 85379 FIBRIN DEGRADATION QUANT: CPT | Performed by: EMERGENCY MEDICINE

## 2019-04-21 RX ORDER — LEVETIRACETAM 250 MG/1
250 TABLET ORAL 2 TIMES DAILY
Status: DISCONTINUED | OUTPATIENT
Start: 2019-04-21 | End: 2019-04-22 | Stop reason: HOSPADM

## 2019-04-21 RX ORDER — ACETAMINOPHEN 650 MG/1
650 SUPPOSITORY RECTAL EVERY 4 HOURS PRN
Status: DISCONTINUED | OUTPATIENT
Start: 2019-04-21 | End: 2019-04-22 | Stop reason: HOSPADM

## 2019-04-21 RX ORDER — CITALOPRAM HYDROBROMIDE 20 MG/1
40 TABLET ORAL DAILY
Status: DISCONTINUED | OUTPATIENT
Start: 2019-04-22 | End: 2019-04-22 | Stop reason: HOSPADM

## 2019-04-21 RX ORDER — ONDANSETRON 2 MG/ML
4 INJECTION INTRAMUSCULAR; INTRAVENOUS EVERY 6 HOURS PRN
Status: DISCONTINUED | OUTPATIENT
Start: 2019-04-21 | End: 2019-04-22 | Stop reason: HOSPADM

## 2019-04-21 RX ORDER — CETIRIZINE HYDROCHLORIDE 10 MG/1
10 TABLET ORAL DAILY
Status: DISCONTINUED | OUTPATIENT
Start: 2019-04-22 | End: 2019-04-22 | Stop reason: HOSPADM

## 2019-04-21 RX ORDER — ONDANSETRON 4 MG/1
4 TABLET, ORALLY DISINTEGRATING ORAL EVERY 6 HOURS PRN
Status: DISCONTINUED | OUTPATIENT
Start: 2019-04-21 | End: 2019-04-22 | Stop reason: HOSPADM

## 2019-04-21 RX ORDER — ACETAMINOPHEN 325 MG/1
650 TABLET ORAL EVERY 4 HOURS PRN
Status: DISCONTINUED | OUTPATIENT
Start: 2019-04-21 | End: 2019-04-22 | Stop reason: HOSPADM

## 2019-04-21 RX ORDER — DONEPEZIL HYDROCHLORIDE 10 MG/1
10 TABLET, FILM COATED ORAL AT BEDTIME
Status: DISCONTINUED | OUTPATIENT
Start: 2019-04-21 | End: 2019-04-22 | Stop reason: HOSPADM

## 2019-04-21 RX ORDER — LEVETIRACETAM 250 MG/1
250 TABLET ORAL 2 TIMES DAILY
Status: DISCONTINUED | OUTPATIENT
Start: 2019-04-21 | End: 2019-04-21

## 2019-04-21 RX ORDER — SODIUM CHLORIDE 9 MG/ML
1000 INJECTION, SOLUTION INTRAVENOUS CONTINUOUS
Status: DISCONTINUED | OUTPATIENT
Start: 2019-04-21 | End: 2019-04-21

## 2019-04-21 RX ORDER — ATOMOXETINE 18 MG/1
54 CAPSULE ORAL EVERY EVENING
Status: DISCONTINUED | OUTPATIENT
Start: 2019-04-22 | End: 2019-04-22 | Stop reason: HOSPADM

## 2019-04-21 RX ORDER — TETRAHYDROZOLINE HCL 0.05 %
1 DROPS OPHTHALMIC (EYE) 3 TIMES DAILY PRN
COMMUNITY

## 2019-04-21 RX ORDER — NALOXONE HYDROCHLORIDE 0.4 MG/ML
.1-.4 INJECTION, SOLUTION INTRAMUSCULAR; INTRAVENOUS; SUBCUTANEOUS
Status: DISCONTINUED | OUTPATIENT
Start: 2019-04-21 | End: 2019-04-22 | Stop reason: HOSPADM

## 2019-04-21 RX ADMIN — Medication 10 MG: at 20:34

## 2019-04-21 RX ADMIN — LEVETIRACETAM 250 MG: 250 TABLET, FILM COATED ORAL at 20:33

## 2019-04-21 RX ADMIN — SODIUM CHLORIDE 1000 ML: 9 INJECTION, SOLUTION INTRAVENOUS at 09:07

## 2019-04-21 RX ADMIN — LEVETIRACETAM 250 MG: 250 TABLET, FILM COATED ORAL at 15:06

## 2019-04-21 RX ADMIN — DONEPEZIL HYDROCHLORIDE 10 MG: 10 TABLET ORAL at 20:33

## 2019-04-21 ASSESSMENT — ENCOUNTER SYMPTOMS
NECK PAIN: 0
SHORTNESS OF BREATH: 0
BACK PAIN: 0
ABDOMINAL PAIN: 0

## 2019-04-21 ASSESSMENT — ACTIVITIES OF DAILY LIVING (ADL)
RETIRED_COMMUNICATION: 0-->UNDERSTANDS/COMMUNICATES WITHOUT DIFFICULTY
TOILETING: 0-->INDEPENDENT
FALL_HISTORY_WITHIN_LAST_SIX_MONTHS: YES
WHICH_OF_THE_ABOVE_FUNCTIONAL_RISKS_HAD_A_RECENT_ONSET_OR_CHANGE?: FALL HISTORY
SWALLOWING: 0-->SWALLOWS FOODS/LIQUIDS WITHOUT DIFFICULTY
TRANSFERRING: 0-->INDEPENDENT
RETIRED_EATING: 0-->INDEPENDENT
AMBULATION: 0-->INDEPENDENT
DRESS: 0-->INDEPENDENT
NUMBER_OF_TIMES_PATIENT_HAS_FALLEN_WITHIN_LAST_SIX_MONTHS: 3
BATHING: 0-->INDEPENDENT
COGNITION: 1 - ATTENTION OR MEMORY DEFICITS

## 2019-04-21 ASSESSMENT — MIFFLIN-ST. JEOR: SCORE: 1090.26

## 2019-04-21 NOTE — PROGRESS NOTES
Patient has had 2 episodes of diaphoresis and unresponsiveness since arriving to the unit.  Vitals are stable and HR is SR per tele tech during these episodes.  Family and caregivers are at bedside.  MD is aware and has seen patient several times since coming up from ED.

## 2019-04-21 NOTE — PLAN OF CARE
"PRIMARY DIAGNOSIS: SYNCOPAL EPISODES vs SEIZURE  OUTPATIENT/OBSERVATION GOALS TO BE MET BEFORE DISCHARGE:  1. Orthostatic performed: Yes:          Lying Orthostatic BP: 142/82         Sitting Orthostatic BP: 149/83         Standing Orthostatic BP: 145/88     2. Diagnostic testing complete & at baseline neurologic testing: Yes - pt had extensive workup with Neuro and Cardiology last fall when these episodes initially started.    3. Cleared by consultants (if involved): No - Neuro consulted, will see pt tomorrow, pt started on Keppra per recommendation after initial episodes last fall.    4. Interpretation of cardiac rhythm per telemetry tech: SR, HR 90    5. Tolerating adequate PO diet and medications: Yes    6. Return to near baseline physical activity or neurologic status: Pt continues to have episodes of \"passing out\".  She appears awake during the episodes she's had here on the floor but is unable to respond.  She began having tremors in the RUE and RLE that only lasted a few minutes.  Once episode passes, patient is at her baseline physically and mentally.     Discharge Planner Nurse   Safe discharge environment identified: Yes  Barriers to discharge: Not once medically cleared     Please review provider order for any additional goals.   Nurse to notify provider when observation goals have been met and patient is ready for discharge.  "

## 2019-04-21 NOTE — PHARMACY-ADMISSION MEDICATION HISTORY
Admission medication history interview status for this patient is complete. See [facility] admission navigator for allergy information, prior to admission medications and immunization status.     Medication history resources (including written lists, pill bottles, clinic record): MAR from [facility]  Primary pharmacy: [facility]    Changes made to [facility] medication list:  Added: visine eye drops  Deleted: ondansetron  Changed: metamucil (daily --> daily PRN)    Prior to Admission medications    Medication Sig Last Dose Taking? Auth Provider   atomoxetine (STRATTERA) 18 MG capsule Take 54 mg by mouth every evening 4/20/2019 at hs Yes Unknown, Entered By History   cetirizine (ZYRTEC) 10 MG tablet Take 1 tablet (10 mg) by mouth daily 4/21/2019 at am Yes Sabrina Dunlap MD   Cholecalciferol (VITAMIN D) 2000 units tablet Take 2,000 Units by mouth daily 4/21/2019 at am Yes Sabrina Dunlap MD   citalopram (CELEXA) 40 MG tablet Take 1 tablet (40 mg) by mouth daily 4/21/2019 at am Yes Sabrina Dunlap MD   donepezil (ARICEPT) 10 MG tablet Take 1 tablet (10 mg) by mouth At Bedtime 4/20/2019 at hs Yes Sabrina Dunlap MD   ferrous sulfate (IRON) 325 (65 Fe) MG tablet Take 1 tablet (325 mg) by mouth daily (with breakfast) 4/21/2019 at am Yes Sabrina Dunlap MD   ibuprofen 200 MG capsule One tablet every 4 to 6 hours as needed Past Month at Unknown time Yes Sabrina Dunlap MD   Melatonin 10 MG TABS tablet Take 10 mg by mouth nightly as needed for sleep  Past Week at Unknown time Yes Reported, Patient   multivitamin, therapeutic with minerals (MULTI-VITAMIN) TABS tablet Take 1 tablet by mouth daily 4/21/2019 at am Yes Sabrina Dunlap MD   psyllium (METAMUCIL/KONSYL) 58.6 % powder Take 1 Tablespoonful by mouth daily as needed for constipation Past Month at Unknown time Yes Unknown, Entered By History   tetrahydrozoline (VISINE) 0.05 % ophthalmic solution Place 1 drop into both eyes 3 times daily as needed  (itchy eyes) Past Month at Unknown time Yes Unknown, Entered By History   carbamide peroxide (DEBROX) 6.5 % otic solution Use twice daily for 4 days prior to appt for ear cleaning. More than a month at Unknown time  Sabrina Dunlap MD

## 2019-04-21 NOTE — ED TRIAGE NOTES
"Patient arrives from group home with a group home staff member with complaints of 3 syncopal episodes since Friday. Per staff member report, patient becomes unresponsive for 15-30 seconds before waking back up. Patient reports feeling \"warm\" before and after episode. Patient also reports similar episodes occurring in September where she was seen by a cardiologist and neurologist. Denies any chest pain, dizziness, or shortness of breath.  "

## 2019-04-21 NOTE — H&P
[facility]    History and Physical - Hospitalist Service       Date of Admission:  4/21/2019    Assessment & Plan   Valentine Vu is a 58 year old female with mental retardation, cognitive deficits, anxiety and MDD  admitted on 4/21/2019 with recurrent syncope.    Recurrent syncope    - was admitted in Sept 2018 and work-up was continued as an out pt (saw Dr. Yu, [facility], had 72 hr EEG, follow-up with Dr. Dunlap)    - her work-up was thorough and negative    - as per Neurology: plan was to start an anti-convulsant if her episodes continued    - will presumptively start keppra 250mg BID, I have already spoken to the Neurologist on call who will have someone see her tomorrow    - I witnessed the tail end of an episode here. It lasted about 15 secs. She was always aware of her surroundings. Here tele was normal (sinus tach), her vitals normal (/75). No headache. She was diaphoretic and had flushing with the episode    - with this episode I will check plasma metanephrines to assess for a pheochromocytoma    MR/cognitive deficits    - at baseline    MDD/anxiety    - cont home meds    Full code    Met with family. Answered all questions         Diet: Regular Diet Adult    DVT Prophylaxis: Low Risk/Ambulatory with no VTE prophylaxis indicated  Westfall Catheter: not present  Code Status: Full Code      Disposition Plan   Expected discharge: Tomorrow, recommended to prior living arrangement once work-up done.  Entered: Mohamud Hayes MD 04/21/2019, 2:23 PM     The patient's care was discussed with the Bedside Nurse, Patient, Patient's Family and Neurology Consultant.    Mohamud Hayes MD  [facility]    ______________________________________________________________________    Chief Complaint   Recurrent syncope    History is obtained from the patient and her family    History of Present Illness   Valentine Vu is a 58 year old female with mental retardation, cognitive deficits,  anxiety and MDD  admitted on 4/21/2019 with recurrent syncope. Patient has had issues with syncope since the Fall. She was admitted here in Sept 2018 and a work-up was started (labs, tele, ECHO, MRI/MRA). This work-up was continued with Dr. Sandoval of Cardiology (ziopatch) and the [facility] (72hr EEG). All testing was unrevealing. Plan as outpt was to start anticonvulsant if episodes continue. On Friday morning patient had an episode at her group home. She was helped to the floor. She was diaphoretic. Patient states she gets a warm flush prior to her episodes. She is always aware of the people around her and what is happening. She is able to speak again after about 30 seconds. Family states her speech is a little slurred initially then normalizes. She had another episode on Sat where she fell on her bottom and another this am where she was helped to the floor. All episodes were similar. Patient denies having headaches related to the episodes. She denies chest pain, sob. No recent URI or urinary symptoms. No fever or chills. She did had N/V/D last Tuesday but this has since resolved. No new meds.    Review of Systems    The 10 point Review of Systems is negative other than noted in the HPI or here.     Past Medical History    I have reviewed this patient's medical history and updated it with pertinent information if needed.   Past Medical History:   Diagnosis Date     ADHD      Dementia      Depressive disorder        Past Surgical History   I have reviewed this patient's surgical history and updated it with pertinent information if needed.  Past Surgical History:   Procedure Laterality Date     ORTHOPEDIC SURGERY      Knee replacements       Social History   I have reviewed this patient's social history and updated it with pertinent information if needed.  Social History     Tobacco Use     Smoking status: Never Smoker     Smokeless tobacco: Never Used   Substance Use Topics     Alcohol use: No     Drug use: No        Family History   I have reviewed this patient's family history and updated it with pertinent information if needed.   Family History   Problem Relation Age of Onset     Leukemia Brother      Unknown/Adopted No family hx of    Mom and dad: no medical problems    Prior to Admission Medications   Prior to Admission Medications   Prescriptions Last Dose Informant Patient Reported? Taking?   Cholecalciferol (VITAMIN D) 2000 units tablet 4/21/2019 at am  No Yes   Sig: Take 2,000 Units by mouth daily   Melatonin 10 MG TABS tablet Past Week at Unknown time  Yes Yes   Sig: Take 10 mg by mouth nightly as needed for sleep    atomoxetine (STRATTERA) 18 MG capsule 4/20/2019 at hs  Yes Yes   Sig: Take 54 mg by mouth every evening   carbamide peroxide (DEBROX) 6.5 % otic solution More than a month at Unknown time  No No   Sig: Use twice daily for 4 days prior to appt for ear cleaning.   cetirizine (ZYRTEC) 10 MG tablet 4/21/2019 at am  No Yes   Sig: Take 1 tablet (10 mg) by mouth daily   citalopram (CELEXA) 40 MG tablet 4/21/2019 at am  No Yes   Sig: Take 1 tablet (40 mg) by mouth daily   donepezil (ARICEPT) 10 MG tablet 4/20/2019 at hs  No Yes   Sig: Take 1 tablet (10 mg) by mouth At Bedtime   ferrous sulfate (IRON) 325 (65 Fe) MG tablet 4/21/2019 at am  No Yes   Sig: Take 1 tablet (325 mg) by mouth daily (with breakfast)   ibuprofen 200 MG capsule Past Month at Unknown time  No Yes   Sig: One tablet every 4 to 6 hours as needed   multivitamin, therapeutic with minerals (MULTI-VITAMIN) TABS tablet 4/21/2019 at am  No Yes   Sig: Take 1 tablet by mouth daily   psyllium (METAMUCIL/KONSYL) 58.6 % powder Past Month at Unknown time  Yes Yes   Sig: Take 1 Tablespoonful by mouth daily as needed for constipation   tetrahydrozoline (VISINE) 0.05 % ophthalmic solution Past Month at Unknown time  Yes Yes   Sig: Place 1 drop into both eyes 3 times daily as needed (itchy eyes)      Facility-Administered Medications: None     Allergies    No Known Allergies    Physical Exam   Vital Signs: Temp: 97.8  F (36.6  C) Temp src: Oral BP: 143/71 Pulse: 95 Heart Rate: 80 Resp: 18 SpO2: 94 % O2 Device: None (Room air)    Weight: 130 lbs 14.4 oz    Constitutional: awake, alert, cooperative, no apparent distress, and appears stated age  Respiratory: No increased work of breathing, good air exchange, clear to auscultation bilaterally, no crackles or wheezing  Cardiovascular: Normal apical impulse, regular rate and rhythm, normal S1 and S2, no S3 or S4, and no murmur noted  GI: No scars, normal bowel sounds, soft, non-distended, non-tender, no masses palpated, no hepatosplenomegally    Data   Data reviewed today: I reviewed all medications, new labs and imaging results over the last 24 hours. I personally reviewed the EKG tracing showing NSR.    Most Recent 3 CBC's:  Recent Labs   Lab Test 04/21/19 0908 09/28/18 2123 08/03/18 0921   WBC 9.9 7.2 7.7   HGB 15.4 15.1 15.3   MCV 86 86 87    340 301     Most Recent 3 BMP's:  Recent Labs   Lab Test 04/21/19  0908 09/28/18 2123 08/03/18  0921    140 142   POTASSIUM 3.7 4.0 5.3   CHLORIDE 104 107 108   CO2 27 30 29   BUN 11 17 15   CR 0.57 0.70 0.55   ANIONGAP 7 3 5   ORLANDO 9.2 8.5 8.9   * 103* 88     Most Recent 2 LFT's:  Recent Labs   Lab Test 08/03/18  0921   AST 14   ALT 27   ALKPHOS 69   BILITOTAL 0.4     Most Recent 3 Troponin's:  Recent Labs   Lab Test 04/21/19  0908 09/28/18 2123   TROPI <0.015 <0.015     Most Recent Urinalysis:  Recent Labs   Lab Test 04/21/19  1001  08/03/18 0921   COLOR Yellow   < > Yellow   APPEARANCE Clear   < > Clear   URINEGLC 100*   < > Negative   URINEBILI Negative   < > Negative   URINEKETONE Negative   < > Negative   SG 1.014   < > 1.025   UBLD Negative   < > Negative   URINEPH 6.5   < > 5.0   PROTEIN Negative   < > Negative   UROBILINOGEN  --   --  0.2   NITRITE Negative   < > Negative   LEUKEST Moderate*   < > Small*   RBCU 1   < > O - 2   WBCU 11*   < >  5-10*    < > = values in this interval not displayed.     No results found for this or any previous visit (from the past 24 hour(s)).

## 2019-04-21 NOTE — PLAN OF CARE
ROOM # 222    Living Situation (if not independent, order SW consult):  Facility name: [facility]  : [place]-    Activity level at baseline: Ind  Activity level on admit: X1      Patient registered to observation; given Patient Bill of Rights; given the opportunity to ask questions about observation status and their plan of care.  Patient has been oriented to the observation room, bathroom and call light is in place.    Discussed discharge goals and expectations with patient/family.

## 2019-04-21 NOTE — ED PROVIDER NOTES
History     Chief Complaint:  Loss of Consciousness     HPI   Valentine Vu is a 58 year old female who presents with syncopal episodes. One of the patient's caretakers state that the patient had her first syncopal episode last September, and she received an echocardiogram, results shown below. Neurologists and cardiologists informed the caretakers that there were no significant findings. Since 2 days ago, however, the patient has had 3 episodes of syncope. Her caretaker states that she falls unprovoked and is unresponsive for roughly 30 seconds after each episode. One episode resulted in her falling down the stairs, but the patient denies any head, back or neck pain. This morning, the patient had her third episode of syncope but her male caretaker caught her, prompting her female caretaker to bring her into the emergency department to be further evaluated. The patient denies any chest pain or shortness of breath, abdominal pain, or any other associated symptoms.     09/28/2018 Echocardiogram Interpretation:  Left ventricular systolic function is normal.The visual ejection fraction is  estimated at 60-65%.  The right ventricular systolic function is normal.  There is mild (1+) mitral regurgitation.  Per Radiologist.     Allergies:  No Known Drug Allergies    Medications:    Strattera  Zyrtec  Vitamin D  Celexa  Aricept  Iron  Melatonin  Multi-vitamin  Zofran  Metamucil     Past Medical History:    The patient denies any relevant past medical history.    Past Surgical History:    History reviewed. No pertinent past surgical history.    Family History:    Leukemia    Social History:  Marital Status: Single  Smoking Status: Never  Alcohol Use: No  Drug Use: No    Review of Systems   Respiratory: Negative for shortness of breath.    Cardiovascular: Negative for chest pain.   Gastrointestinal: Negative for abdominal pain.   Musculoskeletal: Negative for back pain and neck pain.   Neurological: Positive for syncope.   All  other systems reviewed and are negative.    Physical Exam     Patient Vitals for the past 24 hrs:   BP Temp Temp src Pulse Heart Rate Resp SpO2   04/21/19 0915 (!) 148/101 -- -- 103 -- -- --   04/21/19 0914 -- -- -- -- -- -- 96 %   04/21/19 0900 (!) 143/93 -- -- 99 -- -- 98 %   04/21/19 0849 168/90 98  F (36.7  C) Oral -- 93 18 97 %       Physical Exam  Constitutional: Well developed, nontox appearance  Head: Atraumatic.   Mouth/Throat: Oropharynx is clear and moist.   Neck:  no stridor, full ROM, no c spine tenderness  Eyes: no scleral icterus  Cardiovascular: RRR, 2+ bilat radial pulses  Pulmonary/Chest: nml resp effort, Clear BS bilat  Abdominal: ND, soft, NT, no rebound or guarding   Ext: Warm, well perfused, no edema, NT, no deformity, full ROM x4  Neurological: A&Ox3, symmetric facies, moves ext x4, sensation intact  Skin: Skin is warm and dry.   Psychiatric: Behavior is normal. Thought content normal.   Nursing note and vitals reviewed.    Emergency Department Course   ECG:  ECG taken at 0852, ECG read at 0853  Normal sinus rhythm  Normal ECG  No significant change compared to 9/28/18  Rate 88 bpm. MA interval 128. QRS duration 78. QT/QTc 358/433. P-R-T axes -5 45 57.    Laboratory:  Urine Culture: Pending  Troponin: <0.015  Magnesium 2.4 (H)  BMP: Glucose 109 (H),   UA: Glucose 100, Leukocyte Esterase Moderate, WBC 11 (H), Mucous Present, o/w Normal.   CBC: WNL. (WBC 9.9, HGB 15.4, )     Interventions:  0907: NS 1L IV Bolus    Emergency Department Course:  Past medical records, nursing notes, and vitals reviewed.  0852: I performed an exam of the patient and obtained history, as documented above.  1158: Spoke with Dr. Hayes regarding admission.    EKG obtained in the ED, see results above.   The patient provided a urine sample here in the emergency department. This was sent for laboratory testing, findings above.  IV inserted and blood drawn.    admitted to hospital    Impression & Plan      Medical  Decision Makin-year-old female presenting with loss of consciousness    Differential diagnosis includes syncope, dysrhythmia, seizure although less likely given no postictal episode per history,  non-epileptiform seizure/pseudoseizure, psychogenic response, electrolyte abnormality, dehydration.  EKG interpretation as noted above and unremarkable. Labs sig for no sig abnml. Pt w/o urinary sx, urine clx pending, will hold abx for now.  Pt's symptoms are not consistent with orthostasis.  Given her multiple syncopal episodes over the last 3-4 days, pt admitted for obs, cardiac monitoring and further work-up.  Patient and group staff counseled on all results, diagnosis and disposition.  They are understanding and agreeable to plan. Patient admitted for observation in stable condition.      Critical Care time:  none    Diagnosis:    ICD-10-CM    1. Syncope, unspecified syncope type R55 Urine Culture       Disposition:  Admitted to obs    Discharge Medications:     Medication List      There are no discharge medications for this visit.         Westley Horn  2019   [facility] EMERGENCY DEPARTMENT    I, Westley Horn, am serving as a scribe at 8:52 AM on 2019 to document services personally performed by Timothy Felix MD based on my observations and the provider's statements to me.          Timothy Felix MD  19 [phone]

## 2019-04-21 NOTE — ED NOTES
[facility]  ED Nurse Handoff Report    Valentine Vu is a 58 year old female   ED Chief complaint: Loss of Consciousness  . ED Diagnosis:   Final diagnoses:   Syncope, unspecified syncope type     Allergies: No Known Allergies    Code Status: Full Code  Activity level - Baseline/Home:  Independent. Activity Level - Current:   Independent. Lift room needed: No. Bariatric: No   Needed: No   Isolation: No. Infection: Not Applicable.     Vital Signs:   Vitals:    04/21/19 1016 04/21/19 1030 04/21/19 1100 04/21/19 1101   BP:  131/82 145/84    Pulse:  94 95    Resp:       Temp:       TempSrc:       SpO2: 98% 98%  98%       Cardiac Rhythm:  NSR     Pain level:  0  Patient confused: No. Patient Falls Risk: Yes.   Elimination Status: Has voided   Patient Report - Initial Complaint: Syncopal Episode. Focused Assessment: Valentine Vu is a 58 year old female who presents with syncopal episodes. One of the patient's caretakers state that the patient had her first syncopal episode last September, and she received an echocardiogram. Neurologists and cardiologists informed the caretakers that there were no significant findings. Since 2 days ago, however, the patient has had 3 episodes of syncope. Her caretaker states that she falls unprovoked and is unresponsive for roughly 30 seconds after each episode. One episode resulted in her falling down the stairs, but the patient denies any head, back or neck pain. This morning, the patient had her third episode of syncope but her male caretaker caught her, prompting her female caretaker to bring her into the emergency department to be further evaluated. The patient denies any chest pain or shortness of breath, abdominal pain, or any other associated symptoms. Patient's mentation has been appropriate today. No episodes of nausea, vomiting, headache, or syncope during her visit. Patient lives in a group home due to early onset dementia per staff member report. Patient  A/O x 4 during visit.     Tests Performed: EKG, Lab work, CT  . Abnormal Results:   Labs Ordered and Resulted from Time of ED Arrival Up to the Time of Departure from the ED   CBC WITH PLATELETS DIFFERENTIAL - Abnormal; Notable for the following components:       Result Value    RBC Count 5.36 (*)     All other components within normal limits   BASIC METABOLIC PANEL - Abnormal; Notable for the following components:    Glucose 109 (*)     All other components within normal limits   MAGNESIUM - Abnormal; Notable for the following components:    Magnesium 2.4 (*)     All other components within normal limits   ROUTINE UA WITH MICROSCOPIC - Abnormal; Notable for the following components:    Glucose Urine 100 (*)     Leukocyte Esterase Urine Moderate (*)     WBC Urine 11 (*)     Mucous Urine Present (*)     All other components within normal limits   D DIMER QUANTITATIVE   TROPONIN I   ORTHOSTATIC BLOOD PRESSURE AND PULSE   URINE CULTURE AEROBIC BACTERIAL     Treatments provided: NS bolus  Family Comments: Staff member at bedside  OBS brochure/video discussed/provided to patient:  Yes  ED Medications:   Medications   0.9% sodium chloride BOLUS (0 mLs Intravenous Stopped 4/21/19 1007)     Followed by   sodium chloride 0.9% infusion (has no administration in time range)     Drips infusing:  No  For the majority of the shift, the patient's behavior Green. Interventions performed were N/A.     Severe Sepsis OR Septic Shock Diagnosis Present: No      ED Nurse Name/Phone Number: Olimpia MEGAN Trejo,   11:54 AM    RECEIVING UNIT ED HANDOFF REVIEW    Above ED Nurse Handoff Report was reviewed: yes  Reviewed by: Lidia Francis on April 21, 2019 at 12:32 PM

## 2019-04-22 VITALS
SYSTOLIC BLOOD PRESSURE: 136 MMHG | BODY MASS INDEX: 25.7 KG/M2 | HEIGHT: 60 IN | HEART RATE: 95 BPM | TEMPERATURE: 97.1 F | WEIGHT: 130.9 LBS | OXYGEN SATURATION: 97 % | DIASTOLIC BLOOD PRESSURE: 82 MMHG | RESPIRATION RATE: 20 BRPM

## 2019-04-22 LAB
ANION GAP SERPL CALCULATED.3IONS-SCNC: 1 MMOL/L (ref 3–14)
BACTERIA SPEC CULT: NORMAL
BUN SERPL-MCNC: 11 MG/DL (ref 7–30)
CALCIUM SERPL-MCNC: 8.6 MG/DL (ref 8.5–10.1)
CHLORIDE SERPL-SCNC: 107 MMOL/L (ref 94–109)
CO2 SERPL-SCNC: 31 MMOL/L (ref 20–32)
CREAT SERPL-MCNC: 0.6 MG/DL (ref 0.52–1.04)
GFR SERPL CREATININE-BSD FRML MDRD: >90 ML/MIN/{1.73_M2}
GLUCOSE SERPL-MCNC: 93 MG/DL (ref 70–99)
INTERPRETATION ECG - MUSE: NORMAL
INTERPRETATION ECG - MUSE: NORMAL
Lab: NORMAL
POTASSIUM SERPL-SCNC: 3.8 MMOL/L (ref 3.4–5.3)
SODIUM SERPL-SCNC: 139 MMOL/L (ref 133–144)
SPECIMEN SOURCE: NORMAL

## 2019-04-22 PROCEDURE — 25000132 ZZH RX MED GY IP 250 OP 250 PS 637: Performed by: HOSPITALIST

## 2019-04-22 PROCEDURE — A9270 NON-COVERED ITEM OR SERVICE: HCPCS | Performed by: HOSPITALIST

## 2019-04-22 PROCEDURE — 36415 COLL VENOUS BLD VENIPUNCTURE: CPT | Performed by: HOSPITALIST

## 2019-04-22 PROCEDURE — 99217 ZZC OBSERVATION CARE DISCHARGE: CPT | Performed by: HOSPITALIST

## 2019-04-22 PROCEDURE — 80048 BASIC METABOLIC PNL TOTAL CA: CPT | Performed by: HOSPITALIST

## 2019-04-22 PROCEDURE — G0378 HOSPITAL OBSERVATION PER HR: HCPCS

## 2019-04-22 RX ORDER — LEVETIRACETAM 250 MG/1
250 TABLET ORAL 2 TIMES DAILY
Qty: 60 TABLET | Refills: 1 | Status: SHIPPED | OUTPATIENT
Start: 2019-04-22 | End: 2019-06-22

## 2019-04-22 RX ADMIN — CETIRIZINE HYDROCHLORIDE 10 MG: 10 TABLET, FILM COATED ORAL at 08:27

## 2019-04-22 RX ADMIN — LEVETIRACETAM 250 MG: 250 TABLET, FILM COATED ORAL at 08:27

## 2019-04-22 RX ADMIN — ATOMOXETINE 54 MG: 18 CAPSULE ORAL at 08:27

## 2019-04-22 RX ADMIN — CITALOPRAM HYDROBROMIDE 40 MG: 20 TABLET ORAL at 08:27

## 2019-04-22 NOTE — PLAN OF CARE
PRIMARY DIAGNOSIS: SYNCOPE/TIA  OUTPATIENT/OBSERVATION GOALS TO BE MET BEFORE DISCHARGE:  1. Orthostatic performed: Yes:          Lying Orthostatic BP: 142/82         Sitting Orthostatic BP: 149/83         Standing Orthostatic BP: 145/88     2. Diagnostic testing complete & at baseline neurologic testing: N/A    3. Cleared by consultants (if involved): Yes    4. Interpretation of cardiac rhythm per telemetry tech: SR    5. Tolerating adequate PO diet and medications: Yes    6. Return to near baseline physical activity or neurologic status: Yes    Discharge Planner Nurse   Safe discharge environment identified: Yes  Barriers to discharge: No       Entered by: Michael Sampson 04/22/2019     Pt alert and oriented x4. She is denying pain. Nueros intact. SBA. SL. Tele SR. Regular diet. VSS. Hope to go home today.    /82 (BP Location: Right arm)   Pulse 95   Temp 97.1  F (36.2  C) (Oral)   Resp 20   Ht 1.524 m (5')   Wt 59.4 kg (130 lb 14.4 oz)   LMP  (LMP Unknown)   SpO2 97%   BMI 25.56 kg/m         Please review provider order for any additional goals.   Nurse to notify provider when observation goals have been met and patient is ready for discharge.

## 2019-04-22 NOTE — PLAN OF CARE
PRIMARY DIAGNOSIS: SYNCOPE  OUTPATIENT/OBSERVATION GOALS TO BE MET BEFORE DISCHARGE:  1. Orthostatic performed: Yes:          Lying Orthostatic BP: 142/82         Sitting Orthostatic BP: 149/83         Standing Orthostatic BP: 145/88     2. Diagnostic testing complete & at baseline neurologic testing: No pending results for plasma metanephines  to assess for pheochromocytoma    3. Cleared by consultants (if involved): No    4. Interpretation of cardiac rhythm per telemetry tech: SR    5. Tolerating adequate PO diet and medications: Yes, TOLERATING REGULAR DIET    6. Return to near baseline physical activity or neurologic status: Yes    Discharge Planner Nurse   Safe discharge environment identified: Yes  Barriers to discharge: Yes, intermittent syncope episodes       Entered by: Yanira CHINO Che 04/21/2019 9:47 PM   Patient alert and oriented x 4. VSS.  Up with SBA denies pain. SR on telemetry.  NO syncope episode this shift.  Plasma metanephrines in progress.  Plan for possible discharge tomorrow back to group home. Caregiver will transport  Please review provider order for any additional goals.   Nurse to notify provider when observation goals have been met and patient is ready for discharge.

## 2019-04-22 NOTE — PLAN OF CARE
PRIMARY DIAGNOSIS: SYNCOPE  OUTPATIENT/OBSERVATION GOALS TO BE MET BEFORE DISCHARGE:  1. Orthostatic performed: Yes:          Lying Orthostatic BP: 142/82         Sitting Orthostatic BP: 149/83         Standing Orthostatic BP: 145/88     2. Diagnostic testing complete & at baseline neurologic testing: No pending results for plasma metanephines  to assess for pheochromocytoma    3. Cleared by consultants (if involved): No    4. Interpretation of cardiac rhythm per telemetry tech: SR    5. Tolerating adequate PO diet and medications: Yes, TOLERATING REGULAR DIET    6. Return to near baseline physical activity or neurologic status: Yes    Discharge Planner Nurse   Safe discharge environment identified: Yes  Barriers to discharge: Yes, intermittent syncope episodes       Entered by: Yanira CHINO Che 04/22/2019 1:51 AM   Patient alert and oriented x 4. VSS.  Up with SBA denies pain. SR on telemetry.  NO syncope episode this shift.  Plasma metanephrines in progress.  Plan for possible discharge today back to group home. Caregiver will transport  Please review provider order for any additional goals.   Nurse to notify provider when observation goals have been met and patient is ready for discharge.

## 2019-04-22 NOTE — PLAN OF CARE
PRIMARY DIAGNOSIS: SYNCOPE  OUTPATIENT/OBSERVATION GOALS TO BE MET BEFORE DISCHARGE:  1. Orthostatic performed: Yes:          Lying Orthostatic BP: 142/82         Sitting Orthostatic BP: 149/83         Standing Orthostatic BP: 145/88     2. Diagnostic testing complete & at baseline neurologic testing: No pending results for plasma metanephines  to assess for pheochromocytoma    3. Cleared by consultants (if involved): No    4. Interpretation of cardiac rhythm per telemetry tech: SR    5. Tolerating adequate PO diet and medications: Yes, TOLERATING REGULAR DIET    6. Return to near baseline physical activity or neurologic status: Yes    Discharge Planner Nurse   Safe discharge environment identified: Yes  Barriers to discharge: Yes, intermittent syncope episodes       Entered by: Yanira CHINO Che 04/22/2019 6:01 AM   Patient alert and oriented x 4. VSS.  Up with SBA denies pain. SR on telemetry.  NO syncope episode this shift.  Plasma metanephrines in progress.  Plan for possible discharge today back to group home. Caregiver will transport  Please review provider order for any additional goals.   Nurse to notify provider when observation goals have been met and patient is ready for discharge.

## 2019-04-22 NOTE — DISCHARGE SUMMARY
[facility]  Hospitalist Discharge Summary       Date of Admission:  4/21/2019  Date of Discharge:  4/22/2019  Discharging Provider: Mohamud Hayes MD      Discharge Diagnoses   Syncope. Unclear cause    Follow-ups Needed After Discharge   Follow-up Appointments     Follow-up and recommended labs and tests       Follow up with primary care provider, Sabrina Dunlap,on Wednesday,   May 1 at 11am for hospital follow- up.  The following labs/tests are   recommended: follow-up on plasma metanephrines which were sent while here   at the hospital.             Unresulted Labs Ordered in the Past 30 Days of this Admission     Date and Time Order Name Status Description    4/21/2019 1522 Metanephrines Plasma Free In process     4/21/2019 1147 Urine Culture Preliminary       These results will be followed up by PCP    Discharge Disposition   Discharged to home  Condition at discharge: Stable    Hospital Course      Valentine Vu is a 58 year old female with mental retardation, cognitive deficits, anxiety and MDD  admitted on 4/21/2019 with recurrent syncope.    Recurrent syncope    - was admitted in Sept 2018 and work-up was continued as an out pt (saw Dr. Yu, [facility], had 72 hr EEG, follow-up with Dr. Dunlap)    - her work-up was thorough and negative    - as per Neurology: plan was to start an anti-convulsant if her episodes continued    - will presumptively start keppra 250mg BID, I have already spoken to the Neurologist on call who will have someone see her tomorrow    - I witnessed the tail end of an episode here. It lasted about 15 secs. She was always aware of her surroundings. Here tele was normal (sinus tach), her vitals normal (/75). No headache. She was diaphoretic and had flushing with the episode    - with this episode I will check plasma metanephrines to assess for a pheochromocytoma    MR/cognitive deficits    - at baseline    MDD/anxiety    - cont home meds    Full code    Met  with family. Answered all questions    Patient did not have any further episodes overnight or today. Tele was normal. Patient is waiting to see Neurology. I have paged them. Will likely discharge with Keppra 250mg BID and follow-up with Neuro as an outpt. Her plasma metanephrines are still pending. I have called Dr. Dunlap. She is out of town so I will send her an email regarding the patient's stay and follow-up. I have made an appt for her next Wednesday. Her caregiver is here and was updated. I will also call her sister.    Consultations This Hospital Stay   NEUROLOGY IP CONSULT    Code Status   Full Code    Time Spent on this Encounter   I, Mohamud Hayes, personally saw the patient today and spent greater than 30 minutes discharging this patient.       Mohamud Hayes MD  [facility]  ______________________________________________________________________    Physical Exam   Vital Signs: Temp: 98  F (36.7  C) Temp src: Oral BP: 138/76 Pulse: 95 Heart Rate: 90 Resp: 20 SpO2: 100 % O2 Device: None (Room air)    Weight: 130 lbs 14.4 oz  Constitutional: awake, alert, cooperative, no apparent distress, and appears stated age  Respiratory: No increased work of breathing, good air exchange, clear to auscultation bilaterally, no crackles or wheezing  Cardiovascular: Normal apical impulse, regular rate and rhythm, normal S1 and S2, no S3 or S4, and no murmur noted  GI: No scars, normal bowel sounds, soft, non-distended, non-tender, no masses palpated, no hepatosplenomegally       Primary Care Physician   Sabrina Dunlap    Discharge Orders      Reason for your hospital stay    Syncope (passing out) with flushing, tachycardia (fast heart rate)     Follow-up and recommended labs and tests     Follow up with primary care provider, Sabrina Dunlap,on Wednesday, May 1 at 11am for hospital follow- up.  The following labs/tests are recommended: follow-up on plasma metanephrines which were sent while here  at the hospital.     Activity    Your activity upon discharge: activity as tolerated     Diet    Follow this diet upon discharge: Orders Placed This Encounter      Regular Diet Adult       Significant Results and Procedures   Most Recent 3 CBC's:  Recent Labs   Lab Test 04/21/19  0908 09/28/18 2123 08/03/18  0921   WBC 9.9 7.2 7.7   HGB 15.4 15.1 15.3   MCV 86 86 87    340 301     Most Recent 3 BMP's:  Recent Labs   Lab Test 04/22/19  0527 04/21/19 0908 09/28/18 2123    138 140   POTASSIUM 3.8 3.7 4.0   CHLORIDE 107 104 107   CO2 31 27 30   BUN 11 11 17   CR 0.60 0.57 0.70   ANIONGAP 1* 7 3   ORLANDO 8.6 9.2 8.5   GLC 93 109* 103*     Most Recent 2 LFT's:  Recent Labs   Lab Test 08/03/18 0921   AST 14   ALT 27   ALKPHOS 69   BILITOTAL 0.4   ,   Results for orders placed or performed during the hospital encounter of 02/22/19   MA Screen Bilateral w/Suze    Narrative    MA SCREENING BILATERAL W/ SUZE 2/22/2019 9:30 AM    HISTORY:  Screening.  No new breast complaints.    COMPARISON:  [facility]  - 2/20/2018, 1/8/2014    TECHNIQUE:  Digital mammography with CAD is performed as well as DBT.    BREAST DENSITY: Scattered fibroglandular densities.    COMMENTS: No findings of suspicion for malignancy.       Impression    IMPRESSION: BI-RADS CATEGORY: 1 -  NEGATIVE.    RECOMMENDED FOLLOW-UP: Annual Mammography.    HARVEY SEWELL MD       Discharge Medications   Current Discharge Medication List      START taking these medications    Details   levETIRAcetam (KEPPRA) 250 MG tablet Take 1 tablet (250 mg) by mouth 2 times daily  Qty: 60 tablet, Refills: 1    Associated Diagnoses: Syncope, unspecified syncope type         CONTINUE these medications which have NOT CHANGED    Details   atomoxetine (STRATTERA) 18 MG capsule Take 54 mg by mouth every evening      cetirizine (ZYRTEC) 10 MG tablet Take 1 tablet (10 mg) by mouth daily  Qty: 90 tablet, Refills: 4    Associated Diagnoses: Chronic seasonal allergic rhinitis,  unspecified trigger      Cholecalciferol (VITAMIN D) 2000 units tablet Take 2,000 Units by mouth daily  Qty: 100 tablet, Refills: 4    Associated Diagnoses: Primary osteoarthritis involving multiple joints      citalopram (CELEXA) 40 MG tablet Take 1 tablet (40 mg) by mouth daily  Qty: 90 tablet, Refills: 1    Associated Diagnoses: Major depression in complete remission (H)      donepezil (ARICEPT) 10 MG tablet Take 1 tablet (10 mg) by mouth At Bedtime  Qty: 90 tablet, Refills: 4    Associated Diagnoses: Mild dementia      ferrous sulfate (IRON) 325 (65 Fe) MG tablet Take 1 tablet (325 mg) by mouth daily (with breakfast)  Qty: 90 tablet, Refills: 4    Associated Diagnoses: Benign essential hypertension      ibuprofen 200 MG capsule One tablet every 4 to 6 hours as needed  Qty: 120 capsule, Refills: 4    Associated Diagnoses: Primary osteoarthritis involving multiple joints      Melatonin 10 MG TABS tablet Take 10 mg by mouth nightly as needed for sleep       multivitamin, therapeutic with minerals (MULTI-VITAMIN) TABS tablet Take 1 tablet by mouth daily  Qty: 100 tablet, Refills: 4    Associated Diagnoses: Primary osteoarthritis involving multiple joints      psyllium (METAMUCIL/KONSYL) 58.6 % powder Take 1 Tablespoonful by mouth daily as needed for constipation      tetrahydrozoline (VISINE) 0.05 % ophthalmic solution Place 1 drop into both eyes 3 times daily as needed (itchy eyes)      carbamide peroxide (DEBROX) 6.5 % otic solution Use twice daily for 4 days prior to appt for ear cleaning.  Qty: 15 mL, Refills: 4    Associated Diagnoses: Bilateral impacted cerumen           Allergies   No Known Allergies

## 2019-04-22 NOTE — PLAN OF CARE
Patient's After Visit Summary was reviewed with patient and/or caregiver.   Patient verbalized understanding of After Visit Summary, recommended follow up and was given an opportunity to ask questions.   Discharge medications sent home with patient/family:  Keppra sent to [facility] pharmacy    Discharged with other:caregiver    AVS reviewed and questions answered. Left via private transportation. Pt walked downstairs.     OBSERVATION patient END time: 1330

## 2019-04-23 ENCOUNTER — TELEPHONE (OUTPATIENT)
Dept: INTERNAL MEDICINE | Facility: CLINIC | Age: 59
End: 2019-04-23

## 2019-04-23 NOTE — TELEPHONE ENCOUNTER
Jocelyn an RN  at [facility] where pt lives.  .  She is returning our call.      Can call her back at [phone].  Can leave a detailed message on VM.  I told Jocelyn it was a call to f/u Hospitalization visit of pt.     They have f/u appt scheduled for 5/1/19.    Dianelys Bellamy  Pt service Rep

## 2019-04-23 NOTE — TELEPHONE ENCOUNTER
IP F/U    Date: 04/22/19  Diagnosis: Bilateral Impacted Cerumen, Syncope, Unspecified Syncope Type  Is patient active in care coordination? No  Was patient in TCU? No    Next 5 appointments (look out 90 days)    May 01, 2019 11:00 AM CDT  [facility] with [facility]  [facility] [address]  [facility] [address]  [phone]   May 13, 2019  8:20 AM CDT  SHORT with [facility] MD  [facility] [address]  [facility] [address]  [phone]

## 2019-04-24 ENCOUNTER — DOCUMENTATION ONLY (OUTPATIENT)
Dept: OTHER | Facility: CLINIC | Age: 59
End: 2019-04-24

## 2019-04-24 ENCOUNTER — AMBULATORY - HEALTHEAST (OUTPATIENT)
Dept: OTHER | Facility: CLINIC | Age: 59
End: 2019-04-24

## 2019-04-24 LAB
ANNOTATION COMMENT IMP: ABNORMAL
METANEPHS SERPL-SCNC: 0.17 NMOL/L (ref 0–0.49)
NORMETANEPHRINE SERPL-SCNC: 1.11 NMOL/L (ref 0–0.89)

## 2019-04-24 NOTE — TELEPHONE ENCOUNTER
"ED/Discharge Protocol    \"Hi, my name is Valentine BRUCEAsher Kings, a registered nurse, and I am calling on behalf of Dr. Dunlap's office at [place].  I am calling to follow up and see how things are going for you after your recent visit.\"    \"I see that you were in the (ER/UC/IP) on 4/21/19.    How are you doing now that you are home?\"     Patient lives in a group home, writer spoke with group home nurse Jocelyn. Jocelyn states patient is doing well now that she is at home.    Is patient experiencing symptoms that may require a hospital visit?  Jocelyn denies. Denies recurrent syncope  episodes    Discharge Instructions    \"Let's review your discharge instructions.  What is/are the follow-up recommendations?  Pt. Response: Follow up with primary care provider     \"Were you instructed to make a follow-up appointment?\"  Pt. Response: Yes.  Has appointment been made?   Yes      \"When you see the provider, I would recommend that you bring your discharge instructions with you.    Medications    \"How many new medications are you on since your hospitalization/ED visit?\"    2 or more - Epic MTM referral needed  \"How many of your current medicines changed (dose, timing, name, etc.) while you were in the hospital/ED visit?\"   0-1  \"Do you have questions about your medications?\"   No  \"Were you newly diagnosed with heart failure, COPD, diabetes or did you have a heart attack?\"   No  For patients on insulin: \"Did you start on insulin in the hospital or did you have your insulin dose changed?\"   No    Medication reconciliation completed? Yes    Was MTM referral placed (*Make sure to put transitions as reason for referral)?   No. Patient lives in a group home, and medication is administered by staff.    Call Summary    \"Do you have any questions or concerns about your condition or care plan at the moment?\"    No  Triage nurse advice given: N/A    Patient was in ER twice in the past year (assess appropriateness of ER visits.)      \"If you " "have questions or things don't continue to improve, we encourage you contact us through the main clinic number,  [phone].  Even if the clinic is not open, triage nurses are available 24/7 to help you.     We would like you to know that our clinic has extended hours (provide information).  We also have urgent care (provide details on closest location and hours/contact info)\"      \"Thank you for your time and take care!\"        "

## 2019-05-01 ENCOUNTER — OFFICE VISIT (OUTPATIENT)
Dept: INTERNAL MEDICINE | Facility: CLINIC | Age: 59
End: 2019-05-01
Payer: MEDICARE

## 2019-05-01 VITALS
TEMPERATURE: 98.3 F | RESPIRATION RATE: 16 BRPM | HEART RATE: 85 BPM | SYSTOLIC BLOOD PRESSURE: 124 MMHG | DIASTOLIC BLOOD PRESSURE: 70 MMHG | OXYGEN SATURATION: 98 % | BODY MASS INDEX: 26.17 KG/M2 | WEIGHT: 134 LBS

## 2019-05-01 DIAGNOSIS — R81 GLUCOSURIA: ICD-10-CM

## 2019-05-01 DIAGNOSIS — R55 SYNCOPE, UNSPECIFIED SYNCOPE TYPE: Primary | ICD-10-CM

## 2019-05-01 DIAGNOSIS — R73.9 BLOOD GLUCOSE ELEVATED: ICD-10-CM

## 2019-05-01 DIAGNOSIS — J30.2 SEASONAL ALLERGIC RHINITIS, UNSPECIFIED TRIGGER: ICD-10-CM

## 2019-05-01 DIAGNOSIS — R23.2 FLUSHING: ICD-10-CM

## 2019-05-01 LAB
ALBUMIN UR-MCNC: NEGATIVE MG/DL
APPEARANCE UR: CLEAR
BACTERIA #/AREA URNS HPF: ABNORMAL /HPF
BILIRUB UR QL STRIP: NEGATIVE
COLOR UR AUTO: YELLOW
GLUCOSE UR STRIP-MCNC: NEGATIVE MG/DL
HBA1C MFR BLD: 5.2 % (ref 0–5.6)
HGB UR QL STRIP: NEGATIVE
KETONES UR STRIP-MCNC: NEGATIVE MG/DL
LEUKOCYTE ESTERASE UR QL STRIP: ABNORMAL
NITRATE UR QL: NEGATIVE
NON-SQ EPI CELLS #/AREA URNS LPF: ABNORMAL /LPF
PH UR STRIP: 7 PH (ref 5–7)
RBC #/AREA URNS AUTO: ABNORMAL /HPF
SOURCE: ABNORMAL
SP GR UR STRIP: 1.02 (ref 1–1.03)
UROBILINOGEN UR STRIP-ACNC: 0.2 EU/DL (ref 0.2–1)
WBC #/AREA URNS AUTO: ABNORMAL /HPF

## 2019-05-01 PROCEDURE — 99214 OFFICE O/P EST MOD 30 MIN: CPT | Performed by: INTERNAL MEDICINE

## 2019-05-01 PROCEDURE — 83036 HEMOGLOBIN GLYCOSYLATED A1C: CPT | Performed by: INTERNAL MEDICINE

## 2019-05-01 PROCEDURE — 81001 URINALYSIS AUTO W/SCOPE: CPT | Performed by: INTERNAL MEDICINE

## 2019-05-01 PROCEDURE — 36415 COLL VENOUS BLD VENIPUNCTURE: CPT | Performed by: INTERNAL MEDICINE

## 2019-05-01 NOTE — PROGRESS NOTES
SUBJECTIVE:   Valentine Vu is a 58 year old female who presents to clinic today for the following   health issues:          Hospital Follow-up Visit:    Hospital/Nursing Home/IP Rehab Facility: [facility]  Date of Admission: 4/21/19  Date of Discharge: 4/22/19  Reason(s) for Admission: syncope, possible dx of pheochromocytoma    Patient was admitted for a syncopal episode.  CBC and BMP were unremarkable.   Patient had a normal troponin and ddimer.  Her UA revealed some glucose.  Serum glucose was normal.   Neurology was consulted, and they spoke over phone.  She was started on keppra and is to follow up with neurology.    The patient has been seen by both cardiology (had a ziopatch) and neurology (had an EEG) as an outpatient. Cardiology's next step was possible tilt table test.  Patient has had episodes while sitting and laying.  She has not had orthostatic hypotension.      An episode was witnessed in the hospital.  Lasted 15 seconds.  She had sinus tachycardia. bp was 155/75. She was diaphoretic and flushing with the episode.   She had catecholamines drawn, and slightly elevated.  normetanepherine of 1.11 (0-0.89) and diangosis when over 2.2nmol/L        No episodes since the hospital stay.                  Problems taking medications regularly:  None       Medication changes since discharge: added Keppra 250 mg bid       Problems adhering to non-medication therapy:  None    Summary of hospitalization:  [facility] discharge summary reviewed  Diagnostic Tests/Treatments reviewed.  Follow up needed: neurology  Other Healthcare Providers Involved in Patient s Care:         None  Update since discharge: improved.     Post Discharge Medication Reconciliation: discharge medications reconciled, continue medications without change.  Plan of care communicated with patient     Coding guidelines for this visit:  Type of Medical   Decision Making Face-to-Face Visit       within 7 Days of discharge  Face-to-Face Visit        within 14 days of discharge   Moderate Complexity [id number]   High Complexity 49096 [id number]              PROBLEMS TO ADD ON...    Additional history: as documented    Reviewed  and updated as needed this visit by clinical staff  Tobacco  Allergies  Meds  Med Hx  Surg Hx  Fam Hx  Soc Hx        Reviewed and updated as needed this visit by Provider         Labs reviewed in EPIC    ROS:  CONSTITUTIONAL: NEGATIVE for fever, chills, change in weight  RESP: NEGATIVE for significant cough or SOB  CV: NEGATIVE for chest pain, palpitations or peripheral edema  Neuro: syncopal episode    OBJECTIVE:     /70   Pulse 85   Temp 98.3  F (36.8  C) (Oral)   Resp 16   Wt 60.8 kg (134 lb)   LMP  (LMP Unknown)   SpO2 98%   BMI 26.17 kg/m    Body mass index is 26.17 kg/m .  GENERAL: healthy, alert and no distress  RESP: lungs clear to auscultation - no rales, rhonchi or wheezes  CV: regular rate and rhythm, normal S1 S2, no S3 or S4, no murmur, click or rub    ASSESSMENT/PLAN:     (R55) Syncope, unspecified syncope type  (primary encounter diagnosis)  Comment: ? Seizure versus mast cell activation syndrome  Plan: NEUROLOGY ADULT REFERRAL, ALLERGY/ASTHMA ADULT         REFERRAL        -patient is currently taking keppra    (R23.2) Flushing  Comment: consider mast cell activation syndrome given flushing and itching and syncopal episodes?  Plan: ALLERGY/ASTHMA ADULT REFERRAL          (J30.2) Seasonal allergic rhinitis, unspecified trigger  Comment:   Plan: ALLERGY/ASTHMA ADULT REFERRAL            (R81) Glucosuria  Comment: recent lab  Plan: UA with Microscopic reflex to Culture,         Hemoglobin A1c            (R73.9) Blood glucose elevated  Comment: assess since she had glucosuria  Plan: Hemoglobin A1c            Messaged endocrinology to double check that the minor elevation of normetanephrine is unremarkable.  Addendum: 24 hour urine recommended- called patient to - left a  message.      Sabrina Dunlap MD  [facility]

## 2019-05-01 NOTE — LETTER
May 2, 2019      Valentine Vu  [address] [place] [place] [address]        Dear ,    We are writing to inform you of your test results.    Urine reveals no glucose. (resolved from previous)  Hemoglobin A1c reveals no diabetes.    Spoke with endocrinologist, and she recommended 24 hour urine testing of the catecholamines to rule this out. Please call the clinic (lab) to see when you can  the supplies, there is an order in for this.    Resulted Orders   UA with Microscopic reflex to Culture   Result Value Ref Range    Color Urine Yellow     Appearance Urine Clear     Glucose Urine Negative NEG^Negative mg/dL    Bilirubin Urine Negative NEG^Negative    Ketones Urine Negative NEG^Negative mg/dL    Specific Gravity Urine 1.020 1.003 - 1.035    pH Urine 7.0 5.0 - 7.0 pH    Protein Albumin Urine Negative NEG^Negative mg/dL    Urobilinogen Urine 0.2 0.2 - 1.0 EU/dL    Nitrite Urine Negative NEG^Negative    Blood Urine Negative NEG^Negative    Leukocyte Esterase Urine Trace (A) NEG^Negative    Source Midstream Urine     WBC Urine 0 - 5 OTO5^0 - 5 /HPF    RBC Urine O - 2 OTO2^O - 2 /HPF    Squamous Epithelial /LPF Urine Few FEW^Few /LPF    Bacteria Urine Few (A) NEG^Negative /HPF   Hemoglobin A1c   Result Value Ref Range    Hemoglobin A1C 5.2 0 - 5.6 %      Comment:      Normal <5.7% Prediabetes 5.7-6.4%  Diabetes 6.5% or higher - adopted from ADA   consensus guidelines.         If you have any questions or concerns, please call the clinic at the number listed above.       Sincerely,        Sabrina Dunlap MD

## 2019-05-01 NOTE — NURSING NOTE
/70   Pulse 85   Temp 98.3  F (36.8  C) (Oral)   Resp 16   Wt 60.8 kg (134 lb)   LMP  (LMP Unknown)   SpO2 98%   BMI 26.17 kg/m

## 2019-05-02 ENCOUNTER — TELEPHONE (OUTPATIENT)
Dept: INTERNAL MEDICINE | Facility: CLINIC | Age: 59
End: 2019-05-02

## 2019-05-02 DIAGNOSIS — R23.2 FLUSHING: ICD-10-CM

## 2019-05-02 DIAGNOSIS — R55 SYNCOPE, UNSPECIFIED SYNCOPE TYPE: Primary | ICD-10-CM

## 2019-05-02 DIAGNOSIS — J30.2 SEASONAL ALLERGIC RHINITIS, UNSPECIFIED TRIGGER: ICD-10-CM

## 2019-05-06 DIAGNOSIS — R23.2 FLUSHING: ICD-10-CM

## 2019-05-06 PROCEDURE — 83835 ASSAY OF METANEPHRINES: CPT | Mod: 90 | Performed by: INTERNAL MEDICINE

## 2019-05-06 PROCEDURE — 99000 SPECIMEN HANDLING OFFICE-LAB: CPT | Performed by: INTERNAL MEDICINE

## 2019-05-06 NOTE — TELEPHONE ENCOUNTER
Spoke to Jocelyn at [facility]. Informed her Dr. Dunlap provided new Allergy referral to Dr. Moses at [facility].      Referral contact information provided to Jocelyn.

## 2019-05-09 LAB
COLLECT DURATION TIME SPEC: 24 H
CREAT 24H UR-MRATE: 1236 MG/D (ref 500–1400)
CREAT UR-MCNC: 103 MG/DL
METANEPH 24H UR-MCNC: 72 UG/L
METANEPH 24H UR-MRATE: 86 UG/D (ref 39–143)
METANEPH+NORMETANEPH UR-IMP: ABNORMAL
METANEPH/CREAT 24H UR: 70 UG/G CRT (ref 0–300)
NORMETANEPHRINE 24H UR-MCNC: 355 UG/L
NORMETANEPHRINE 24H UR-MRATE: 426 UG/D (ref 109–393)
NORMETANEPHRINE/CREAT 24H UR: 345 UG/G CRT (ref 0–400)
SPECIMEN VOL ?TM UR: 1200 ML

## 2019-05-24 ENCOUNTER — TRANSFERRED RECORDS (OUTPATIENT)
Dept: HEALTH INFORMATION MANAGEMENT | Facility: CLINIC | Age: 59
End: 2019-05-24

## 2019-05-28 ENCOUNTER — HOSPITAL ENCOUNTER (OUTPATIENT)
Facility: CLINIC | Age: 59
Discharge: NURSING, REHAB OR LONG-TERM CARE | End: 2019-05-28
Attending: COLON & RECTAL SURGERY | Admitting: COLON & RECTAL SURGERY
Payer: MEDICARE

## 2019-05-28 VITALS
SYSTOLIC BLOOD PRESSURE: 133 MMHG | OXYGEN SATURATION: 97 % | RESPIRATION RATE: 20 BRPM | HEART RATE: 64 BPM | DIASTOLIC BLOOD PRESSURE: 86 MMHG

## 2019-05-28 LAB
COLONOSCOPY: NORMAL
GLUCOSE BLDC GLUCOMTR-MCNC: 43 MG/DL (ref 70–99)
GLUCOSE BLDC GLUCOMTR-MCNC: 87 MG/DL (ref 70–99)
GLUCOSE SERPL-MCNC: 95 MG/DL (ref 70–99)

## 2019-05-28 PROCEDURE — G0500 MOD SEDAT ENDO SERVICE >5YRS: HCPCS | Performed by: COLON & RECTAL SURGERY

## 2019-05-28 PROCEDURE — 36415 COLL VENOUS BLD VENIPUNCTURE: CPT | Performed by: COLON & RECTAL SURGERY

## 2019-05-28 PROCEDURE — 82962 GLUCOSE BLOOD TEST: CPT

## 2019-05-28 PROCEDURE — G0105 COLORECTAL SCRN; HI RISK IND: HCPCS | Performed by: COLON & RECTAL SURGERY

## 2019-05-28 PROCEDURE — 25000128 H RX IP 250 OP 636: Performed by: COLON & RECTAL SURGERY

## 2019-05-28 PROCEDURE — 82947 ASSAY GLUCOSE BLOOD QUANT: CPT | Mod: XU | Performed by: COLON & RECTAL SURGERY

## 2019-05-28 PROCEDURE — 45378 DIAGNOSTIC COLONOSCOPY: CPT | Performed by: COLON & RECTAL SURGERY

## 2019-05-28 RX ORDER — ONDANSETRON 2 MG/ML
4 INJECTION INTRAMUSCULAR; INTRAVENOUS EVERY 6 HOURS PRN
Status: DISCONTINUED | OUTPATIENT
Start: 2019-05-28 | End: 2019-05-28 | Stop reason: HOSPADM

## 2019-05-28 RX ORDER — FENTANYL CITRATE 50 UG/ML
INJECTION, SOLUTION INTRAMUSCULAR; INTRAVENOUS PRN
Status: DISCONTINUED | OUTPATIENT
Start: 2019-05-28 | End: 2019-05-28 | Stop reason: HOSPADM

## 2019-05-28 RX ORDER — ONDANSETRON 2 MG/ML
4 INJECTION INTRAMUSCULAR; INTRAVENOUS
Status: DISCONTINUED | OUTPATIENT
Start: 2019-05-28 | End: 2019-05-28 | Stop reason: HOSPADM

## 2019-05-28 RX ORDER — FLUMAZENIL 0.1 MG/ML
0.2 INJECTION, SOLUTION INTRAVENOUS
Status: DISCONTINUED | OUTPATIENT
Start: 2019-05-28 | End: 2019-05-28 | Stop reason: HOSPADM

## 2019-05-28 RX ORDER — LIDOCAINE 40 MG/G
CREAM TOPICAL
Status: DISCONTINUED | OUTPATIENT
Start: 2019-05-28 | End: 2019-05-28 | Stop reason: HOSPADM

## 2019-05-28 RX ORDER — ONDANSETRON 4 MG/1
4 TABLET, ORALLY DISINTEGRATING ORAL EVERY 6 HOURS PRN
Status: DISCONTINUED | OUTPATIENT
Start: 2019-05-28 | End: 2019-05-28 | Stop reason: HOSPADM

## 2019-05-28 RX ORDER — NALOXONE HYDROCHLORIDE 0.4 MG/ML
.1-.4 INJECTION, SOLUTION INTRAMUSCULAR; INTRAVENOUS; SUBCUTANEOUS
Status: DISCONTINUED | OUTPATIENT
Start: 2019-05-28 | End: 2019-05-28 | Stop reason: HOSPADM

## 2019-05-28 NOTE — OR NURSING
Pt said she is diabetic so after iv started blood sugar checked from the venous sample and read 37 pt is a symptomatic ,blood sugar rechecked again and was 43 then rechecked capillary and was 87 ,lab results discussed  with mady ,lab supervisor and with the different in lab values decided to put order for lab to draw stat ,

## 2019-05-28 NOTE — H&P
Pre-Endoscopy History and Physical     Valentine Vu MRN# [id number]   YOB: 1960 Age: 58 year old     Date of Procedure: 5/28/2019  Primary care provider: Sabrina Dunlap  Type of Endoscopy: colonoscopy  Reason for Procedure: surveillance  Type of Anesthesia Anticipated: Moderate Sedation    HPI:    Valentine is a 58 year old female who will be undergoing the above procedure.      A history and physical has been performed. The patient's medications and allergies have been reviewed. The risks and benefits of the procedure and the sedation options and risks were discussed with the patient.  All questions were answered and informed consent was obtained.      She denies a personal or family history of anesthesia complications or bleeding disorders.     No Known Allergies     Prior to Admission Medications   Prescriptions Last Dose Informant Patient Reported? Taking?   Cholecalciferol (VITAMIN D) 2000 units tablet 5/28/2019 at Unknown time  No Yes   Sig: Take 2,000 Units by mouth daily   Melatonin 10 MG TABS tablet 5/27/2019 at Unknown time  Yes Yes   Sig: Take 10 mg by mouth nightly as needed for sleep    carbamide peroxide (DEBROX) 6.5 % otic solution 5/28/2019 at Unknown time  No Yes   Sig: Use twice daily for 4 days prior to appt for ear cleaning.   cetirizine (ZYRTEC) 10 MG tablet 5/28/2019 at Unknown time  No Yes   Sig: Take 1 tablet (10 mg) by mouth daily   citalopram (CELEXA) 40 MG tablet 5/28/2019 at Unknown time  No Yes   Sig: Take 1 tablet (40 mg) by mouth daily   donepezil (ARICEPT) 10 MG tablet 5/28/2019 at Unknown time  No Yes   Sig: Take 1 tablet (10 mg) by mouth At Bedtime   ferrous sulfate (IRON) 325 (65 Fe) MG tablet 5/28/2019 at Unknown time  No Yes   Sig: Take 1 tablet (325 mg) by mouth daily (with breakfast)   ibuprofen 200 MG capsule 5/28/2019 at Unknown time  No Yes   Sig: One tablet every 4 to 6 hours as needed   levETIRAcetam (KEPPRA) 250 MG tablet 5/28/2019 at Unknown time  No Yes    Sig: Take 1 tablet (250 mg) by mouth 2 times daily   multivitamin, therapeutic with minerals (MULTI-VITAMIN) TABS tablet 5/28/2019 at Unknown time  No Yes   Sig: Take 1 tablet by mouth daily   psyllium (METAMUCIL/KONSYL) 58.6 % powder Past Week at Unknown time  Yes Yes   Sig: Take 1 Tablespoonful by mouth daily as needed for constipation   tetrahydrozoline (VISINE) 0.05 % ophthalmic solution Unknown at Unknown time  Yes No   Sig: Place 1 drop into both eyes 3 times daily as needed (itchy eyes)      Facility-Administered Medications: None       Patient Active Problem List   Diagnosis     Major depression in complete remission (H)     Seasonal allergic rhinitis     Mild dementia     Benign essential hypertension     Syncope        Past Medical History:   Diagnosis Date     ADHD      Dementia      Depressive disorder         Past Surgical History:   Procedure Laterality Date     ORTHOPEDIC SURGERY      Knee replacements       Social History     Tobacco Use     Smoking status: Never Smoker     Smokeless tobacco: Never Used   Substance Use Topics     Alcohol use: No       Family History   Problem Relation Age of Onset     Leukemia Brother      Unknown/Adopted No family hx of        REVIEW OF SYSTEMS:     5 point ROS negative except as noted above in HPI, including Gen., Resp., CV, GI &  system review.      PHYSICAL EXAM:   /79   Resp 20   LMP  (LMP Unknown)   SpO2 98%  Estimated body mass index is 26.17 kg/m  as calculated from the following:    Height as of 4/21/19: 1.524 m (5').    Weight as of 5/1/19: 60.8 kg (134 lb).   GENERAL APPEARANCE: healthy and alert  MENTAL STATUS: alert  AIRWAY EXAM: Mallampatti Class I (visualization of the soft palate, fauces, uvula, anterior and posterior pillars)  RESP: lungs clear to auscultation - no rales, rhonchi or wheezes  CV: regular rates and rhythm      DIAGNOSTICS:    Not indicated      IMPRESSION   ASA Class 2 - Mild systemic disease        PLAN:       Plan for  colonoscopy. We discussed the risks, benefits and alternatives and the patient wished to proceed.    The above has been forwarded to the consulting provider.      Signed Electronically by: Maddy Morrow MD  May 28, 2019     intact

## 2019-05-29 ENCOUNTER — TELEPHONE (OUTPATIENT)
Dept: INTERNAL MEDICINE | Facility: CLINIC | Age: 59
End: 2019-05-29

## 2019-05-29 DIAGNOSIS — E16.2 HYPOGLYCEMIA: Primary | ICD-10-CM

## 2019-05-29 DIAGNOSIS — R55 SYNCOPE, UNSPECIFIED SYNCOPE TYPE: ICD-10-CM

## 2019-05-29 NOTE — TELEPHONE ENCOUNTER
Jocelyn calling from the group home because Valentine had a colonoscopy yesterday. Valentine's blood sugars were very low at 39 and then it did go up to 97 eventually. They were told by the hospital to let the PCP know about this. Jocelyn can be reached at [phone].

## 2019-05-29 NOTE — TELEPHONE ENCOUNTER
Please let group home know I would like her to see endocrinology, as she has had those syncopal episodes.  Not sure if related to low blood glucoses then.      Also would like to assess labs

## 2019-05-29 NOTE — TELEPHONE ENCOUNTER
Called to triage further, writer wanted to know how patient is doing now, is she symptomatic, etc. Left a voicemail asking Jocelyn to call the clinic back, will route to primary care provider as an FYI.

## 2019-05-30 NOTE — TELEPHONE ENCOUNTER
Can she schedule with NP?    Also, wondering if we could have nutritionist set up continuing glucose monitoring for low blood glucose assessment ahead of time?

## 2019-05-30 NOTE — TELEPHONE ENCOUNTER
Group home calling back. Message given. Will make lab only appt. Would like a few Endocrinology clinic numbers to set up appt. Dr Wright out a long time. Please call back [phone]

## 2019-05-30 NOTE — TELEPHONE ENCOUNTER
Routed to provider to review for alternative Endocrinology clinic referral as Dr. Neville is scheduling out 2 months.

## 2019-05-31 NOTE — TELEPHONE ENCOUNTER
Niharika Galdamez NP does not have any appointments available until Sept.   Nutrition referral would need to be placed for patient to be seen for continuous glucose monitoring.

## 2019-06-03 NOTE — TELEPHONE ENCOUNTER
Left V/m for pt/Sharri at group home letting her know that PCP placed nutrition referral and that endocrinology is booked out through [facility] until August with Dr Neville and September for Dr Garcia and Niharika Galdamez, [facility]. Let them know that we can place referral for [facility] of endocrinology if she can get in sooner there. Will call them to see when they have availability-PCP wants her to be seen within 2-4 weeks

## 2019-06-03 NOTE — TELEPHONE ENCOUNTER
Group home calling back. They are fine with waiting until Aug or Sept to see Dr Wright. Message given and will make appt with PCP in 2-4- weeks.

## 2019-06-05 ENCOUNTER — TELEPHONE (OUTPATIENT)
Dept: INTERNAL MEDICINE | Facility: CLINIC | Age: 59
End: 2019-06-05

## 2019-06-05 NOTE — TELEPHONE ENCOUNTER
Jocelyn at [facility] [phone] called and stated patient reports being tired during the day and Psych thought it may be her allergy medication so they wanted to get an ok to take it at night.  Please advise.

## 2019-06-05 NOTE — LETTER
To whom it concerns:  Valentine Vu is a patient under my care.  Okay for the patient to take her zyrtec at night.  Please call with any questions.      Sabrina Dunlap MD

## 2019-06-06 NOTE — TELEPHONE ENCOUNTER
Milena at [facility] informed okay to take allergy medication - Cetrizine (Zyrtec) at night. They will need signed order for this faxed to their office.     Fax [phone]

## 2019-06-20 ENCOUNTER — HOSPITAL ENCOUNTER (OUTPATIENT)
Dept: LAB | Facility: CLINIC | Age: 59
Discharge: HOME | End: 2019-06-20
Attending: ALLERGY & IMMUNOLOGY | Admitting: ALLERGY & IMMUNOLOGY
Payer: MEDICARE

## 2019-06-20 ENCOUNTER — ALLIED HEALTH/NURSE VISIT (OUTPATIENT)
Dept: NURSING | Facility: CLINIC | Age: 59
End: 2019-06-20
Payer: MEDICARE

## 2019-06-20 DIAGNOSIS — R21 RASH: Primary | ICD-10-CM

## 2019-06-20 DIAGNOSIS — Z48.02 ENCOUNTER FOR REMOVAL OF SUTURES: Primary | ICD-10-CM

## 2019-06-20 DIAGNOSIS — R23.2 BLUSHING: ICD-10-CM

## 2019-06-20 DIAGNOSIS — R55 SYNCOPE, UNSPECIFIED SYNCOPE TYPE: ICD-10-CM

## 2019-06-20 LAB
ALT SERPL W P-5'-P-CCNC: 62 U/L (ref 0–50)
ANION GAP SERPL CALCULATED.3IONS-SCNC: 5 MMOL/L (ref 3–14)
AST SERPL W P-5'-P-CCNC: 32 U/L (ref 0–45)
BILIRUB DIRECT SERPL-MCNC: 0.1 MG/DL (ref 0–0.2)
BILIRUB SERPL-MCNC: 0.4 MG/DL (ref 0.2–1.3)
BUN SERPL-MCNC: 22 MG/DL (ref 7–30)
CALCIUM SERPL-MCNC: 8.5 MG/DL (ref 8.5–10.1)
CHLORIDE SERPL-SCNC: 107 MMOL/L (ref 94–109)
CO2 SERPL-SCNC: 27 MMOL/L (ref 20–32)
CREAT SERPL-MCNC: 0.57 MG/DL (ref 0.52–1.04)
ERYTHROCYTE [DISTWIDTH] IN BLOOD BY AUTOMATED COUNT: 13.1 % (ref 10–15)
GFR SERPL CREATININE-BSD FRML MDRD: >90 ML/MIN/{1.73_M2}
GLUCOSE SERPL-MCNC: 138 MG/DL (ref 70–99)
HCT VFR BLD AUTO: 45.1 % (ref 35–47)
HGB BLD-MCNC: 14.8 G/DL (ref 11.7–15.7)
MCH RBC QN AUTO: 29.1 PG (ref 26.5–33)
MCHC RBC AUTO-ENTMCNC: 32.8 G/DL (ref 31.5–36.5)
MCV RBC AUTO: 89 FL (ref 78–100)
PLATELET # BLD AUTO: 323 10E9/L (ref 150–450)
POTASSIUM SERPL-SCNC: 3.9 MMOL/L (ref 3.4–5.3)
RBC # BLD AUTO: 5.08 10E12/L (ref 3.8–5.2)
SODIUM SERPL-SCNC: 139 MMOL/L (ref 133–144)
WBC # BLD AUTO: 6.8 10E9/L (ref 4–11)

## 2019-06-20 PROCEDURE — 84460 ALANINE AMINO (ALT) (SGPT): CPT | Performed by: ALLERGY & IMMUNOLOGY

## 2019-06-20 PROCEDURE — 85027 COMPLETE CBC AUTOMATED: CPT | Performed by: ALLERGY & IMMUNOLOGY

## 2019-06-20 PROCEDURE — 83698 ASSAY LIPOPROTEIN PLA2: CPT | Performed by: ALLERGY & IMMUNOLOGY

## 2019-06-20 PROCEDURE — 84999 UNLISTED CHEMISTRY PROCEDURE: CPT | Performed by: ALLERGY & IMMUNOLOGY

## 2019-06-20 PROCEDURE — 84450 TRANSFERASE (AST) (SGOT): CPT | Performed by: ALLERGY & IMMUNOLOGY

## 2019-06-20 PROCEDURE — 83088 ASSAY OF HISTAMINE: CPT | Performed by: ALLERGY & IMMUNOLOGY

## 2019-06-20 PROCEDURE — 82248 BILIRUBIN DIRECT: CPT | Performed by: ALLERGY & IMMUNOLOGY

## 2019-06-20 PROCEDURE — 82955 ASSAY OF G6PD ENZYME: CPT | Performed by: ALLERGY & IMMUNOLOGY

## 2019-06-20 PROCEDURE — 81272 KIT GENE TARGETED SEQ ANALYS: CPT | Performed by: ALLERGY & IMMUNOLOGY

## 2019-06-20 PROCEDURE — 82247 BILIRUBIN TOTAL: CPT | Performed by: ALLERGY & IMMUNOLOGY

## 2019-06-20 PROCEDURE — 80048 BASIC METABOLIC PNL TOTAL CA: CPT | Performed by: ALLERGY & IMMUNOLOGY

## 2019-06-20 PROCEDURE — 83520 IMMUNOASSAY QUANT NOS NONAB: CPT | Performed by: ALLERGY & IMMUNOLOGY

## 2019-06-20 PROCEDURE — 36415 COLL VENOUS BLD VENIPUNCTURE: CPT | Performed by: ALLERGY & IMMUNOLOGY

## 2019-06-20 NOTE — TELEPHONE ENCOUNTER
"Requested Prescriptions   Pending Prescriptions Disp Refills     levETIRAcetam (KEPPRA) 250 MG tablet  Last Written Prescription Date:  04/22/19  Last Fill Quantity: 60,  # refills: 1   Last office visit: 5/1/2019 with prescribing provider:  05/01/19   Future Office Visit:   Next 5 appointments (look out 90 days)    Jul 02, 2019 10:20 AM CDT  SHORT with Sabrina Dunlap MD  [facility] [address]  [facility] [id number] [phone]   Jul 24, 2019  2:30 PM CDT  Office Visit with [facility] NUTRITION [facility]OURCE  [facility]) [address]  [facility] [id number] [phone]          60 tablet 1     Sig: Take 1 tablet (250 mg) by mouth 2 times daily       Anti-Seizure Meds Protocol  Failed - 6/20/2019  3:45 PM        Failed - Review Authorizing provider's last note.      Refer to last progress notes: confirm request is for original authorizing provider (cannot be through other providers).          Failed - Normal ALT or AST on file in past 26 months     Recent Labs   Lab Test 06/20/19  1503   ALT 62*     Recent Labs   Lab Test 06/20/19  1503   AST 32             Passed - Recent (12 mo) or future (30 days) visit within the authorizing provider's specialty     Patient had office visit in the last 12 months or has a visit in the next 30 days with authorizing provider or within the authorizing provider's specialty.  See \"Patient Info\" tab in inbasket, or \"Choose Columns\" in Meds & Orders section of the refill encounter.              Passed - Normal CBC on file in past 26 months     Recent Labs   Lab Test 06/20/19  1503   WBC 6.8   RBC 5.08   HGB 14.8   HCT 45.1                    Passed - Normal platelet count on file in past 26 months     Recent Labs   Lab Test 06/20/19  1503                  Passed - Medication is active on med list        Passed - No active pregnancy on record        Passed " - No positive pregnancy test in last 12 months

## 2019-06-20 NOTE — PROGRESS NOTES
Valentine Vu presents to the clinic today for removal of sutures.  The patient has had the sutures in place for 5 days.  There has been no history of infection or drainage.  5 sutures are seen located on the chin.  The wound is healing well with no signs of infection.  Tetanus status is up to date.   All sutures were easily removed today.  Routine wound care discussed.  The patient will follow up as needed.

## 2019-06-21 LAB — G6PD RBC-CCNC: 12.8 U/G HB (ref 9.9–16.6)

## 2019-06-22 LAB — HISTAMINE SERPL-SCNC: 28 NMOL/L (ref 0–8)

## 2019-06-22 RX ORDER — LEVETIRACETAM 250 MG/1
250 TABLET ORAL 2 TIMES DAILY
Qty: 60 TABLET | Refills: 1 | Status: SHIPPED | OUTPATIENT
Start: 2019-06-22 | End: 2020-01-08

## 2019-06-22 NOTE — TELEPHONE ENCOUNTER
Routing refill request to provider for review/approval because:  Last prescribed in the hospital    Next 5 appointments (look out 90 days)    Jul 02, 2019 10:20 AM CDT  SHORT with Sabrina Dunlap MD  [facility] [address]  [facility] [id number] [phone]   Jul 24, 2019  2:30 PM CDT  Office Visit with [facility] NUTRITION RESOURCE  [facility]) [address]  [facility] [id number] [phone]

## 2019-06-23 ENCOUNTER — HOSPITAL ENCOUNTER (OUTPATIENT)
Dept: LAB | Facility: CLINIC | Age: 59
End: 2019-06-23
Payer: MEDICARE

## 2019-06-23 DIAGNOSIS — R21 RASH: Primary | ICD-10-CM

## 2019-06-23 DIAGNOSIS — R23.2 BLUSHING: ICD-10-CM

## 2019-06-24 LAB — TRYPTASE SERPL-MCNC: 5.6 UG/L

## 2019-06-25 DIAGNOSIS — R23.2 BLUSHING: ICD-10-CM

## 2019-06-25 DIAGNOSIS — R21 RASH: Primary | ICD-10-CM

## 2019-06-28 ENCOUNTER — TELEPHONE (OUTPATIENT)
Dept: INTERNAL MEDICINE | Facility: CLINIC | Age: 59
End: 2019-06-28

## 2019-06-28 LAB
COPATH REPORT: NORMAL
LAB SCANNED RESULT: NORMAL

## 2019-06-29 ENCOUNTER — HOSPITAL ENCOUNTER (OUTPATIENT)
Dept: LAB | Facility: CLINIC | Age: 59
Discharge: HOME | End: 2019-06-29
Attending: ALLERGY & IMMUNOLOGY | Admitting: ALLERGY & IMMUNOLOGY
Payer: MEDICARE

## 2019-06-29 DIAGNOSIS — R21 RASH: Primary | ICD-10-CM

## 2019-06-29 DIAGNOSIS — R23.2 BLUSHING: ICD-10-CM

## 2019-06-29 PROCEDURE — 83088 ASSAY OF HISTAMINE: CPT | Performed by: ALLERGY & IMMUNOLOGY

## 2019-06-30 ENCOUNTER — HOSPITAL ENCOUNTER (OUTPATIENT)
Dept: LAB | Facility: CLINIC | Age: 59
End: 2019-06-30
Attending: ALLERGY & IMMUNOLOGY
Payer: MEDICARE

## 2019-06-30 DIAGNOSIS — R23.2 BLUSHING: ICD-10-CM

## 2019-06-30 DIAGNOSIS — R21 RASH: ICD-10-CM

## 2019-07-01 ENCOUNTER — DOCUMENTATION ONLY (OUTPATIENT)
Dept: OTHER | Facility: CLINIC | Age: 59
End: 2019-07-01

## 2019-07-02 ENCOUNTER — OFFICE VISIT (OUTPATIENT)
Dept: INTERNAL MEDICINE | Facility: CLINIC | Age: 59
End: 2019-07-02
Payer: MEDICARE

## 2019-07-02 VITALS
HEART RATE: 71 BPM | RESPIRATION RATE: 12 BRPM | SYSTOLIC BLOOD PRESSURE: 100 MMHG | TEMPERATURE: 98.2 F | DIASTOLIC BLOOD PRESSURE: 60 MMHG | BODY MASS INDEX: 25.91 KG/M2 | WEIGHT: 132 LBS | OXYGEN SATURATION: 97 % | HEIGHT: 60 IN

## 2019-07-02 DIAGNOSIS — R23.2 BLUSHING: ICD-10-CM

## 2019-07-02 DIAGNOSIS — E16.2 HYPOGLYCEMIA: ICD-10-CM

## 2019-07-02 DIAGNOSIS — R21 RASH: ICD-10-CM

## 2019-07-02 LAB — INSULIN SERPL-ACNC: 3 MU/L (ref 3–25)

## 2019-07-02 PROCEDURE — 99000 SPECIMEN HANDLING OFFICE-LAB: CPT | Performed by: INTERNAL MEDICINE

## 2019-07-02 PROCEDURE — 84681 ASSAY OF C-PEPTIDE: CPT | Performed by: INTERNAL MEDICINE

## 2019-07-02 PROCEDURE — 99213 OFFICE O/P EST LOW 20 MIN: CPT | Performed by: INTERNAL MEDICINE

## 2019-07-02 PROCEDURE — 83088 ASSAY OF HISTAMINE: CPT | Mod: 90 | Performed by: INTERNAL MEDICINE

## 2019-07-02 PROCEDURE — 83525 ASSAY OF INSULIN: CPT | Performed by: INTERNAL MEDICINE

## 2019-07-02 PROCEDURE — 36415 COLL VENOUS BLD VENIPUNCTURE: CPT | Performed by: INTERNAL MEDICINE

## 2019-07-02 ASSESSMENT — MIFFLIN-ST. JEOR: SCORE: 1095.25

## 2019-07-02 NOTE — LETTER
July 8, 2019      Valentine Vu  [address] [place] [place] [address]        Dear ,    We are writing to inform you of your test results.  Your test results fall within the expected range(s) or remain unchanged from previous results.  Please continue with current treatment plan.    Resulted Orders   C-peptide   Result Value Ref Range    C Peptide 1.2 0.9 - 6.9 ng/mL   Insulin level   Result Value Ref Range    Insulin 3.0 3 - 25 mU/L   Histamine urine   Result Value Ref Range    Hours Collected 36HR       Total Urine Volume 1300ML       Histamine Urine/Volume 134 nmol/L    Histamine Urine/G Creatinine 168 0 - 450 nmol/g CRT      Comment:      (Note)  INTERPRETIVE INFORMATION:  Histamine, Urine nmol/g creat  Test developed and characteristics determined by [facility]. See Compliance Statement D: [facility]/      Creatinine Urine/Volume 80 mg/dL    Histamine Urine/24 hr Not Applicable 0 - 60 ug/d      Comment:      (Note)  Performed by [facility],  [address] [place] [id number] [phone]  [email or web address], Yoav Garner MD, Lab. Director         If you have any questions or concerns, please call the clinic at the number listed above.     Sincerely,  Sabrina Dunlap MD

## 2019-07-02 NOTE — NURSING NOTE
/60   Pulse 71   Temp 98.2  F (36.8  C) (Oral)   Resp 12   Ht 1.524 m (5')   Wt 59.9 kg (132 lb)   LMP  (LMP Unknown)   SpO2 97%   Breastfeeding? No   BMI 25.78 kg/m

## 2019-07-02 NOTE — LETTER
July 3, 2019      Valentine Vu  [address] [place] [address]        Dear MsMeka,    We are writing to inform you of your test results.    Insulin and c peptide are within normal limits    Resulted Orders   C-peptide   Result Value Ref Range    C Peptide 1.2 0.9 - 6.9 ng/mL   Insulin level   Result Value Ref Range    Insulin 3.0 3 - 25 mU/L       If you have any questions or concerns, please call the clinic at the number listed above.       Sincerely,        Sabrina Dunlap MD

## 2019-07-02 NOTE — PROGRESS NOTES
Subjective     Valentine Vu is a 58 year old female who presents to clinic today for the following health issues:  Patient present with caretaker.    HPI     Syncopal episodes.   The patient has had multiple episodes where she is flushing and staring. The patient patient has been hospitalized for this, and found to have sinus tachycardia at the time.  She also has been seen by cardiology and had a ziopatch reealing no significant findings.  In the hospital, she was started on Keppra per neurology. She had a 72 hour EEG that was unremarkable.   Patient recently was found to have a blood glucose of 39 prior to her colonoscopy.   The patient has an appt in September with endocrinology.    She has seen allergy/asthma, and reports that the doctor did not think this was mast cell activation.       Reviewed and updated as needed this visit by Provider  Meds         Review of Systems   ROS COMP: CONSTITUTIONAL: NEGATIVE for fever, chills, change in weight  RESP: NEGATIVE for significant cough or SOB  CV: NEGATIVE for chest pain, palpitations or peripheral edema      Objective    /60   Pulse 71   Temp 98.2  F (36.8  C) (Oral)   Resp 12   Ht 1.524 m (5')   Wt 59.9 kg (132 lb)   LMP  (LMP Unknown)   SpO2 97%   Breastfeeding? No   BMI 25.78 kg/m    Body mass index is 25.78 kg/m .  Physical Exam   GENERAL: healthy, alert and no distress  RESP: lungs clear to auscultation - no rales, rhonchi or wheezes  CV: regular rate and rhythm, normal S1 S2, no S3 or S4, no murmur, click or rub    Diagnostic Test Results:  Labs reviewed in Epic        Assessment & Plan       (E16.2) Hypoglycemia  Comment:   Plan: endocrinology    (R21) Rash  Comment:   Plan: Histamine urine            (R23.2) Blushing  Comment:   Plan: Histamine urine          -continue on keppra for now  -f/u with neurology, pending  Endocrinology work up  -pt also to consider follow up with asthma/allergy      BMI:   Estimated body mass index is 25.78 kg/m   as calculated from the following:    Height as of this encounter: 1.524 m (5').    Weight as of this encounter: 59.9 kg (132 lb).           See Patient Instructions    Return in about 3 months (around 10/2/2019) for Physical Exam.    Sabrina Dunlap MD  [facility]

## 2019-07-03 DIAGNOSIS — R21 RASH: Primary | ICD-10-CM

## 2019-07-03 DIAGNOSIS — R23.2 BLUSHING: ICD-10-CM

## 2019-07-03 LAB — C PEPTIDE SERPL-MCNC: 1.2 NG/ML (ref 0.9–6.9)

## 2019-07-09 LAB — MISCELLANEOUS TEST: NORMAL

## 2019-07-10 LAB
COLLECT DURATION TIME SPEC: NORMAL H
COLLECT DURATION TIME SPEC: NORMAL H
CREAT UR-MCNC: 182 MG/DL
CREAT UR-MCNC: NORMAL MG/DL
HISTAMINE 24H UR-MRATE: 55
HISTAMINE 24H UR-MRATE: NORMAL
HISTAMINE UR-SCNC: 382 NMOL/L
HISTAMINE UR-SCNC: NORMAL NMOL/L
HISTAMINE/CREAT 24H UR-SRTO: 210
HISTAMINE/CREAT 24H UR-SRTO: NORMAL
SPECIMEN VOL ?TM UR: NORMAL ML
SPECIMEN VOL ?TM UR: NORMAL ML

## 2019-07-24 ENCOUNTER — OFFICE VISIT (OUTPATIENT)
Dept: NUTRITION | Facility: CLINIC | Age: 59
End: 2019-07-24
Payer: MEDICARE

## 2019-07-24 DIAGNOSIS — E16.2 HYPOGLYCEMIA, UNSPECIFIED: Primary | ICD-10-CM

## 2019-07-24 PROCEDURE — 95250 CONT GLUC MNTR PHYS/QHP EQP: CPT

## 2019-07-24 NOTE — PROGRESS NOTES
Diabetes Self-Management Education & Support  Professional Continuous Glucose Monitor Insertion    SUBJECTIVE/OBJECTIVE:     Valentine Vu presents for professional Continuous Glucose Monitor Insertion.     Patient comments/concerns:  Patient does not have diagnosis of diabetes.  CGM recommended to rule-out hypoglycemia due to concerning episodes of fainting.    Lab Results:  Lab Results   Component Value Date    A1C 5.2 05/01/2019      Lab Results   Component Value Date     06/20/2019       Medication:      ASSESSMENT:    CGM study indicated for: possible hypoglycemia    INTERVENTION:   Sensor started today.   Lot: [id number]  SN: [id number]  Exp: 11/30/19                  Sensor was inserted with no resistance or bleeding at insertion site.      Pt will plan to wear the sensor through 8/7.    WRITTEN AND VERBAL INFORMATION GIVEN TO SUPPORT UNDERSTANDING OF:  LibrePro CGM: Sensor insertion, intention of monitoring for 14 days. Keep records of BG, food intake, exercise, and medication dosing during wear.       Patient verbalizes understanding of how to remove sensor, if needed, and all instructions provided.     Educational and other materials:  Food/exercise/medication log sheets  Contact information    PLAN:  Pt was given instructions for tracking BG, medications, food intake and activity.  Patient to return all items associated with the professional Continuous Glucose Monitor System.  See Patient Instructions, AVS printed and provided to patient today.    Follow-up:    Follow up on 8/13.    Coco Villatoro RD LD CDE    Time Spent: 30 minutes  Encounter Type: Individual

## 2019-08-01 ENCOUNTER — TELEPHONE (OUTPATIENT)
Dept: INTERNAL MEDICINE | Facility: CLINIC | Age: 59
End: 2019-08-01

## 2019-08-14 ENCOUNTER — OFFICE VISIT (OUTPATIENT)
Dept: NUTRITION | Facility: CLINIC | Age: 59
End: 2019-08-14
Payer: MEDICARE

## 2019-08-14 DIAGNOSIS — E16.2 HYPOGLYCEMIA, UNSPECIFIED: Primary | ICD-10-CM

## 2019-08-14 PROCEDURE — 97802 MEDICAL NUTRITION INDIV IN: CPT

## 2019-08-14 NOTE — Clinical Note
Please see scanned continuous glucose monitoring (CGM) reports, interpretation and recommendations. As a provider, you can bill for a non face-to-face interpretation of the sensor report. If you feel it is appropriate, please create a 'Documentation Only' encounter noting the interpretation and your recommended plan and bill for this glucose sensor interpretation using code [id number].The CGM report appears normal.  Does patient need to keep the Endo appointment?BASILIO Cardozo CDE

## 2019-08-14 NOTE — PROGRESS NOTES
LibrePro Continuous Glucose Monitor Interpretation     Patient History:   1. Type of Diabetes: no diagnosis of diabetes - CGM ordered to rule-out hypoglycemia  2. Duration of diabetes or year of diagnosis: n/a  3. Current treatment regimen (include all diabetes medications, dose & dosing frequency/timing): no  *Abbreviated insulin dose documentation key: Insulin Trade Name (zypymomdp-ohkeo-oxeuax-bedtime) - i.e. Humalog 5-5-5-0 (Humalog 5 units at breakfast, 5 units at lunch, and 5 units at dinner).  4. Most Recent A1c Result:    Lab Results   Component Value Date    A1C 5.2 2019     5. Indication/s for LibrePro study: rule-out hypoglycemia.    Statistics:   1. Sensor worn for 14 days.  2. Glucose excursions:   Percent in target is: 97%  Percent above target is: 0%  Percent below target is: 3%  3. Estimated average glucose: 92 mg/dL                              Data evaluation:   1. Sensor modal day evaluation shows the followin. Consistent day-to-day patterns noted: normal blood sugars.  2. Low glucose events: none, although blood sugar runs 70-80 overnight       Assessment and Plan:  CGM results are normal.      BASILIO Cardozo CDE    Time Spent: 30 minutes  Any diabetes medication dose changes were made via the CDE Protocol and Collaborative Practice Agreement with the patient's referring provider. A copy of this encounter was shared with the provider.

## 2019-09-05 ENCOUNTER — OFFICE VISIT (OUTPATIENT)
Dept: ENDOCRINOLOGY | Facility: CLINIC | Age: 59
End: 2019-09-05
Payer: MEDICARE

## 2019-09-05 VITALS
HEART RATE: 66 BPM | RESPIRATION RATE: 16 BRPM | WEIGHT: 133.1 LBS | HEIGHT: 60 IN | OXYGEN SATURATION: 96 % | TEMPERATURE: 98 F | DIASTOLIC BLOOD PRESSURE: 71 MMHG | BODY MASS INDEX: 26.13 KG/M2 | SYSTOLIC BLOOD PRESSURE: 107 MMHG

## 2019-09-05 DIAGNOSIS — I10 BENIGN ESSENTIAL HYPERTENSION: ICD-10-CM

## 2019-09-05 DIAGNOSIS — R55 SYNCOPE, UNSPECIFIED SYNCOPE TYPE: Primary | ICD-10-CM

## 2019-09-05 PROCEDURE — 99204 OFFICE O/P NEW MOD 45 MIN: CPT | Performed by: INTERNAL MEDICINE

## 2019-09-05 ASSESSMENT — MIFFLIN-ST. JEOR: SCORE: 1100.24

## 2019-09-05 NOTE — LETTER
9/5/2019         RE: Valentine Vu  [address] [place] [address]        Dear Colleague,    Thank you for referring your patient, Valentine Vu, to the [facility]. Please see a copy of my visit note below.    Name: Valentine Vu  Seen at the request of Sabrina Dunlap for syncope.  Chief Complaint   Patient presents with     Establish Care     hypoglycemia, syncope - has seen cardiology, neurology, nutrition, allergy, hosp at [facility]      HPI:  Valentine Vu is a 58 year old female who presents for the evaluation of  Above.   has a past medical history of ADHD, Dementia, and Depressive disorder.  Syncopal episodes- first noticed in Sep 2019, nov 2018. Then no episodes till April 2019.    The patient has had multiple episodes where she is flushing and staring.   Last one was 1 week back and at that episode she was not warm to touch.    During these episodes she is staring and feels warm. Last upto 2 min. And resolves by itself.    During one episode she was hospitalization she has similar episode.  At that time her heart rate was, blood pressure was normal and blood sugar was normal per her report.    Prior to her colonoscopy in May (was NPO prior to that)- was found to have a blood glucose of 39.  Was seen by RD: had CGm done-no major hypoglycemic episodes were noticed.  Under endocrinology work-up showed normal C-peptide, insulin levels.  She also had 24-hour urine metanephrine collection done which showed mildly elevated normetanephrine levels plasma metanephrines were obtained in the setting of hospitalization and were slightly elevated..    The patient patient has been hospitalized for this, and found to have sinus tachycardia at the time.      Was seen by cardiology-  and had a ziopatch reealing no significant findings.  Was seen by Neurology- was started on Keppra per neurology.  She was also seen by allergist.  Histamine is noted to be in acceptable range.  She feels well at this  time and denies any tachycardia, palpitations, heat intolerance, tremor, insomnia, diarrhea, or unexplained weight loss.    Wt Readings from Last 2 Encounters:   09/05/19 60.4 kg (133 lb 1.6 oz)   07/02/19 59.9 kg (132 lb)     PMH/PSH:  Past Medical History:   Diagnosis Date     ADHD      Dementia      Depressive disorder      Past Surgical History:   Procedure Laterality Date     COLONOSCOPY N/A 5/28/2019    Procedure: COLONOSCOPY (crsal);  Surgeon: Maddy Morrow MD;  Location:  GI     ORTHOPEDIC SURGERY      Knee replacements     Family Hx:  Family History   Problem Relation Age of Onset     Leukemia Brother      Unknown/Adopted No family hx of             Social Hx:  Social History     Socioeconomic History     Marital status: Single     Spouse name: Not on file     Number of children: Not on file     Years of education: Not on file     Highest education level: Not on file   Occupational History     Not on file   Social Needs     Financial resource strain: Not on file     Food insecurity:     Worry: Not on file     Inability: Not on file     Transportation needs:     Medical: Not on file     Non-medical: Not on file   Tobacco Use     Smoking status: Never Smoker     Smokeless tobacco: Never Used   Substance and Sexual Activity     Alcohol use: No     Drug use: No     Sexual activity: Never   Lifestyle     Physical activity:     Days per week: Not on file     Minutes per session: Not on file     Stress: Not on file   Relationships     Social connections:     Talks on phone: Not on file     Gets together: Not on file     Attends Congregational service: Not on file     Active member of club or organization: Not on file     Attends meetings of clubs or organizations: Not on file     Relationship status: Not on file     Intimate partner violence:     Fear of current or ex partner: Not on file     Emotionally abused: Not on file     Physically abused: Not on file     Forced sexual activity: Not on file   Other  Topics Concern     Parent/sibling w/ CABG, MI or angioplasty before 65F 55M? Not Asked   Social History Narrative     Not on file          MEDICATIONS:  has a current medication list which includes the following prescription(s): carbamide peroxide, cetirizine, vitamin d3, citalopram, donepezil, ferrous sulfate, ibuprofen, levetiracetam, melatonin, multivitamin w/minerals, psyllium, and tetrahydrozoline.    ROS     ROS: 10 point ROS neg other than the symptoms noted above in the HPI.    Physical Exam   VS: /71 (BP Location: Right arm, Patient Position: Chair, Cuff Size: Adult Regular)   Pulse 66   Temp 98  F (36.7  C) (Oral)   Resp 16   Ht 1.524 m (5')   Wt 60.4 kg (133 lb 1.6 oz)   LMP  (LMP Unknown)   SpO2 96%   Breastfeeding? No   BMI 25.99 kg/m     GENERAL: AXOX3, NAD, well dressed, answering questions appropriately, appears stated age.  HEENT: No exopthalmous, no proptosis, EOMI, no lig lag, no retraction  NECK: Thyroid normal in size, non tender, no nodules were palpated.  CV: RRR, no rubs, gallops, no murmurs  LUNGS: CTAB, no wheezes, rales, or ronchi  ABDOMEN: +BS  EXTREMITIES: no edema, +pulses, no rashes, no lesions  NEUROLOGY: CN grossly intact, + DTR upper and lower extremity, no tremors  MSK: grossly intact  SKIN: no rashes, no lesions  PSYCH: normal affect and mood    LABS:  Last Basic Metabolic Panel:  Lab Results   Component Value Date     06/20/2019      Lab Results   Component Value Date    POTASSIUM 3.9 06/20/2019     Lab Results   Component Value Date    CHLORIDE 107 06/20/2019     Lab Results   Component Value Date    ORLANDO 8.5 06/20/2019     Lab Results   Component Value Date    CO2 27 06/20/2019     Lab Results   Component Value Date    BUN 22 06/20/2019     Lab Results   Component Value Date    CR 0.57 06/20/2019     Lab Results   Component Value Date     06/20/2019       TSH   Date Value Ref Range Status   09/28/2018 2.20 0.40 - 4.00 mU/L Final   ]      All pertinent  notes, labs, and images personally reviewed by me.     A/P  Ms.Valentine Vu is a 58 year old here for the evaluation of:    1. Syncope, unspecified syncope type    Extensive work-up has been pursued so far.  Normal C-peptide, insulin levels.  Slightly elevated 24-hour urine metanephrine and plasma metanephrine levels.  She does not have typical criteria pupils tried which is usually present in patient with insulinoma.  History of one episode of low blood sugar in the setting of n.p.o. status before colonoscopy  Diagnostic CGM was placed by primary care provider which did not identify major episodes of hypoglycemia  She is undergoing work-up through neurology, allergist and cardiology.  I had long discussion with patient.  Given normal endocrine work-up so far low suspicion for insulinoma or pheochromocytoma  Will rule out adrenal insufficiency with morning cortisol level.  If abnormal can consider further testing.  Plan: CortisolTSH with free T4 reflex          More than 50% of face to face time spent with Ms. Vu on counseling / coordinating her care.  In addition to clinic visit more than 31 minutes spent reviewing records from other specialist, lab test, hospital notes.    All questions were answered.  The patient indicates understanding of the above issues and agrees with the plan set forth.       Follow-up:  After above.    Antonieta Neville MD  Endocrinology   [facility]    CC: Sabrina Dunlap     Disclaimer: This note consists of symbols derived from keyboarding, dictation and/or voice recognition software. As a result, there may be errors in the script that have gone undetected. Please consider this when interpreting information found in this chart.    Addendum to above note and clinic visit:    Labs reviewed.    See result note/telephone encounter.            Again, thank you for allowing me to participate in the care of your patient.        Sincerely,        Antonieta Neville,  MD

## 2019-09-05 NOTE — PROGRESS NOTES
Name: Valentine Vu  Seen at the request of Sabrina Dunlap for syncope.  Chief Complaint   Patient presents with     [facility]     hypoglycemia, syncope - has seen cardiology, neurology, nutrition, allergy, hosp at [facility]      HPI:  Valentine Vu is a 58 year old female who presents for the evaluation of  Above.   has a past medical history of ADHD, Dementia, and Depressive disorder.  Syncopal episodes- first noticed in Sep 2019, nov 2018. Then no episodes till April 2019.    The patient has had multiple episodes where she is flushing and staring.   Last one was 1 week back and at that episode she was not warm to touch.    During these episodes she is staring and feels warm. Last upto 2 min. And resolves by itself.    During one episode she was hospitalization she has similar episode.  At that time her heart rate was, blood pressure was normal and blood sugar was normal per her report.    Prior to her colonoscopy in May (was NPO prior to that)- was found to have a blood glucose of 39.  Was seen by RD: had CGm done-no major hypoglycemic episodes were noticed.  Under endocrinology work-up showed normal C-peptide, insulin levels.  She also had 24-hour urine metanephrine collection done which showed mildly elevated normetanephrine levels plasma metanephrines were obtained in the setting of hospitalization and were slightly elevated..    The patient patient has been hospitalized for this, and found to have sinus tachycardia at the time.      Was seen by cardiology-  and had a ziopatch reealing no significant findings.  Was seen by Neurology- was started on Keppra per neurology.  She was also seen by allergist.  Histamine is noted to be in acceptable range.  She feels well at this time and denies any tachycardia, palpitations, heat intolerance, tremor, insomnia, diarrhea, or unexplained weight loss.    Wt Readings from Last 2 Encounters:   09/05/19 60.4 kg (133 lb 1.6 oz)   07/02/19 59.9 kg (132 lb)      PMH/PSH:  Past Medical History:   Diagnosis Date     ADHD      Dementia      Depressive disorder      Past Surgical History:   Procedure Laterality Date     COLONOSCOPY N/A 5/28/2019    Procedure: COLONOSCOPY (crsal);  Surgeon: Maddy Morrow MD;  Location:  GI     ORTHOPEDIC SURGERY      Knee replacements     Family Hx:  Family History   Problem Relation Age of Onset     Leukemia Brother      Unknown/Adopted No family hx of             Social Hx:  Social History     Socioeconomic History     Marital status: Single     Spouse name: Not on file     Number of children: Not on file     Years of education: Not on file     Highest education level: Not on file   Occupational History     Not on file   Social Needs     Financial resource strain: Not on file     Food insecurity:     Worry: Not on file     Inability: Not on file     Transportation needs:     Medical: Not on file     Non-medical: Not on file   Tobacco Use     Smoking status: Never Smoker     Smokeless tobacco: Never Used   Substance and Sexual Activity     Alcohol use: No     Drug use: No     Sexual activity: Never   Lifestyle     Physical activity:     Days per week: Not on file     Minutes per session: Not on file     Stress: Not on file   Relationships     Social connections:     Talks on phone: Not on file     Gets together: Not on file     Attends Nondenominational service: Not on file     Active member of club or organization: Not on file     Attends meetings of clubs or organizations: Not on file     Relationship status: Not on file     Intimate partner violence:     Fear of current or ex partner: Not on file     Emotionally abused: Not on file     Physically abused: Not on file     Forced sexual activity: Not on file   Other Topics Concern     Parent/sibling w/ CABG, MI or angioplasty before 65F 55M? Not Asked   Social History Narrative     Not on file          MEDICATIONS:  has a current medication list which includes the following  prescription(s): carbamide peroxide, cetirizine, vitamin d3, citalopram, donepezil, ferrous sulfate, ibuprofen, levetiracetam, melatonin, multivitamin w/minerals, psyllium, and tetrahydrozoline.    ROS     ROS: 10 point ROS neg other than the symptoms noted above in the HPI.    Physical Exam   VS: /71 (BP Location: Right arm, Patient Position: Chair, Cuff Size: Adult Regular)   Pulse 66   Temp 98  F (36.7  C) (Oral)   Resp 16   Ht 1.524 m (5')   Wt 60.4 kg (133 lb 1.6 oz)   LMP  (LMP Unknown)   SpO2 96%   Breastfeeding? No   BMI 25.99 kg/m    GENERAL: AXOX3, NAD, well dressed, answering questions appropriately, appears stated age.  HEENT: No exopthalmous, no proptosis, EOMI, no lig lag, no retraction  NECK: Thyroid normal in size, non tender, no nodules were palpated.  CV: RRR, no rubs, gallops, no murmurs  LUNGS: CTAB, no wheezes, rales, or ronchi  ABDOMEN: +BS  EXTREMITIES: no edema, +pulses, no rashes, no lesions  NEUROLOGY: CN grossly intact, + DTR upper and lower extremity, no tremors  MSK: grossly intact  SKIN: no rashes, no lesions  PSYCH: normal affect and mood    LABS:  Last Basic Metabolic Panel:  Lab Results   Component Value Date     06/20/2019      Lab Results   Component Value Date    POTASSIUM 3.9 06/20/2019     Lab Results   Component Value Date    CHLORIDE 107 06/20/2019     Lab Results   Component Value Date    ORLANDO 8.5 06/20/2019     Lab Results   Component Value Date    CO2 27 06/20/2019     Lab Results   Component Value Date    BUN 22 06/20/2019     Lab Results   Component Value Date    CR 0.57 06/20/2019     Lab Results   Component Value Date     06/20/2019       TSH   Date Value Ref Range Status   09/28/2018 2.20 0.40 - 4.00 mU/L Final   ]      All pertinent notes, labs, and images personally reviewed by me.     A/P  Ms.Mary Vu is a 58 year old here for the evaluation of:    1. Syncope, unspecified syncope type    Extensive work-up has been pursued so far.   Normal C-peptide, insulin levels.  Slightly elevated 24-hour urine metanephrine and plasma metanephrine levels.  She does not have typical criteria pupils tried which is usually present in patient with insulinoma.  History of one episode of low blood sugar in the setting of n.p.o. status before colonoscopy  Diagnostic CGM was placed by primary care provider which did not identify major episodes of hypoglycemia  She is undergoing work-up through neurology, allergist and cardiology.  I had long discussion with patient.  Given normal endocrine work-up so far low suspicion for insulinoma or pheochromocytoma  Will rule out adrenal insufficiency with morning cortisol level.  If abnormal can consider further testing.  Plan: CortisolTSH with free T4 reflex          More than 50% of face to face time spent with Ms. Vu on counseling / coordinating her care.  In addition to clinic visit more than 31 minutes spent reviewing records from other specialist, lab test, hospital notes.    All questions were answered.  The patient indicates understanding of the above issues and agrees with the plan set forth.       Follow-up:  After above.    Antonieta Neville MD  Endocrinology   [facility]    CC: Sabrina Dunlap     Disclaimer: This note consists of symbols derived from keyboarding, dictation and/or voice recognition software. As a result, there may be errors in the script that have gone undetected. Please consider this when interpreting information found in this chart.    Addendum to above note and clinic visit:    Labs reviewed.    See result note/telephone encounter.

## 2019-09-05 NOTE — NURSING NOTE
ENDOCRINOLOGY INTAKE FORM    Patient Name:  Valentine Vu  :  1960    Is patient Diabetic?   No  Does patient have non-diabetic or other endocrine issues?  Yes: hypoglycemia    Vitals: LMP  (LMP Unknown)   BMI= There is no height or weight on file to calculate BMI.    Smoking and Alcohol use:  Social History     Tobacco Use     Smoking status: Never Smoker     Smokeless tobacco: Never Used   Substance Use Topics     Alcohol use: No     Drug use: No     Lucita Huynh CMA  [facility]  [place]

## 2019-09-05 NOTE — PATIENT INSTRUCTIONS
[facility]   Dr Neville, Endocrinology Department      [facility]   [address]  [place] [address]  Appointment [phone]  Fax: [phone]  [place]: Monday and Tuesday         [facility]   [address]  [place] [address]  Appointment [phone]  Fax: [phone]  [place]: Wednesday and Thursday            Please check the cost coverage and copay with insurance before recommended tests, services and medications (especially if new medications are prescribed).     If ordered, please get blood work done 1 week prior to your next appointment so they will be available to Dr. Neville at your visit.    Need morning cortisol levels.  Further work up based on that.

## 2019-10-15 ENCOUNTER — OFFICE VISIT (OUTPATIENT)
Dept: INTERNAL MEDICINE | Facility: CLINIC | Age: 59
End: 2019-10-15
Payer: MEDICARE

## 2019-10-15 VITALS
OXYGEN SATURATION: 95 % | HEART RATE: 65 BPM | WEIGHT: 134 LBS | DIASTOLIC BLOOD PRESSURE: 60 MMHG | BODY MASS INDEX: 27.01 KG/M2 | HEIGHT: 59 IN | RESPIRATION RATE: 18 BRPM | TEMPERATURE: 98.2 F | SYSTOLIC BLOOD PRESSURE: 108 MMHG

## 2019-10-15 DIAGNOSIS — R55 SYNCOPE, UNSPECIFIED SYNCOPE TYPE: ICD-10-CM

## 2019-10-15 DIAGNOSIS — G44.209 TENSION HEADACHE: Primary | ICD-10-CM

## 2019-10-15 DIAGNOSIS — I10 BENIGN ESSENTIAL HYPERTENSION: ICD-10-CM

## 2019-10-15 PROCEDURE — 99213 OFFICE O/P EST LOW 20 MIN: CPT | Performed by: INTERNAL MEDICINE

## 2019-10-15 RX ORDER — IBUPROFEN 200 MG
200-400 TABLET ORAL EVERY 4 HOURS PRN
Qty: 30 TABLET | Refills: 0 | Status: SHIPPED | OUTPATIENT
Start: 2019-10-15

## 2019-10-15 ASSESSMENT — PAIN SCALES - GENERAL: PAINLEVEL: NO PAIN (0)

## 2019-10-15 ASSESSMENT — MIFFLIN-ST. JEOR: SCORE: 1092.41

## 2019-10-15 NOTE — PROGRESS NOTES
"Subjective     Valentine Vu is a 58 year old female who presents to clinic today for the following health issues:    HPI   Pt having episodes lasting 2 minutes, syncope, pt here with  from group home, last episode was different was able to answer questions, 5 episodes since last visit.    Since patient was last seen in 7/2019, she had seen endocrinology for her blood glucose of 39.  She had continuous glucose monitoring, which was within acceptable range.   Normal c-peptide.  Endocrinology wanted to assess for adrenal insufficiency with cortisol level and also check a thyroid.     Since July, she had an episode in August.  She was going through her clothes.  She was on her hands and knees.  She felt herself getting warm.  Lasted under 2 minutes.  Then she was okay.      At work she also had another episode Aug 29th.  She took a seat and lasted 2 minutes. She was incoherent and not able to respond well.      She had another episode at her place.  She was not in pain at that time.  And she also was able to respond with shaking her head.  Could not get her words out. September 16th.     She had another episode with her family this past weekend.     She was confused recently when she took the dog for a walk.     She has seen neurology and is on keppra.  Also has seen cardiology.     They have discussed with psychiatry.  She does not feel anxious when this is occurring.    Reviewed and updated as needed this visit by Provider  Meds  Problems         Review of Systems   ROS COMP: CONSTITUTIONAL: NEGATIVE for fever, chills, change in weight  ENT/MOUTH: NEGATIVE for ear, mouth and throat problems  RESP: NEGATIVE for significant cough or SOB  CV: NEGATIVE for chest pain, palpitations or peripheral edema      Objective    /60 (BP Location: Left arm, Patient Position: Sitting, Cuff Size: Adult Regular)   Pulse 65   Temp 98.2  F (36.8  C) (Oral)   Resp 18   Ht 1.505 m (4' 11.25\")   Wt 60.8 kg (134 lb) " "  LMP  (LMP Unknown)   SpO2 95%   BMI 26.84 kg/m    Body mass index is 26.84 kg/m .  Physical Exam   GENERAL: healthy, alert and no distress  NECK: no adenopathy, no asymmetry, masses, or scars and thyroid normal to palpation  RESP: lungs clear to auscultation - no rales, rhonchi or wheezes  CV: regular rate and rhythm, normal S1 S2, no S3 or S4, no murmur, click or rub, no peripheral edema and peripheral pulses strong  ABDOMEN: soft, nontender, no hepatosplenomegaly, no masses and bowel sounds normal  MS: no gross musculoskeletal defects noted, no edema    Diagnostic Test Results:  Labs reviewed in Epic        Assessment & Plan       ICD-10-CM    1. Tension headache G44.209 ibuprofen (ADVIL/MOTRIN) 200 MG tablet   2. Benign essential hypertension I10 TSH with free T4 reflex     CANCELED: TSH with free T4 reflex   3. Syncope, unspecified syncope type R55 NEUROLOGY ADULT REFERRAL     Cortisol     CANCELED: Cortisol        BMI:   Estimated body mass index is 26.84 kg/m  as calculated from the following:    Height as of this encounter: 1.505 m (4' 11.25\").    Weight as of this encounter: 60.8 kg (134 lb).           See Patient Instructions    No follow-ups on file.    Sabrina Dunlap MD  [facility]        "

## 2019-10-15 NOTE — NURSING NOTE
"Chief Complaint   Patient presents with     RECHECK     pt having episodes lasting 2 minutes, syncope, pt here with  from group home, last episode was different was able to answer questions, 5 episodes since last visit.      initial /60 (BP Location: Left arm, Patient Position: Sitting, Cuff Size: Adult Regular)   Pulse 65   Temp 98.2  F (36.8  C) (Oral)   Resp 18   Ht 1.505 m (4' 11.25\")   Wt 60.8 kg (134 lb)   LMP  (LMP Unknown)   SpO2 95%   BMI 26.84 kg/m   Estimated body mass index is 26.84 kg/m  as calculated from the following:    Height as of this encounter: 1.505 m (4' 11.25\").    Weight as of this encounter: 60.8 kg (134 lb)..  bp completed using cuff size regular    "

## 2019-10-18 ASSESSMENT — PATIENT HEALTH QUESTIONNAIRE - PHQ9: SUM OF ALL RESPONSES TO PHQ QUESTIONS 1-9: 1

## 2019-10-23 ENCOUNTER — PRE VISIT (OUTPATIENT)
Dept: NEUROLOGY | Facility: CLINIC | Age: 59
End: 2019-10-23

## 2019-10-25 DIAGNOSIS — R55 SYNCOPE, UNSPECIFIED SYNCOPE TYPE: ICD-10-CM

## 2019-10-25 DIAGNOSIS — I10 BENIGN ESSENTIAL HYPERTENSION: ICD-10-CM

## 2019-10-25 LAB
CORTIS SERPL-MCNC: 12.6 UG/DL (ref 4–22)
TSH SERPL DL<=0.005 MIU/L-ACNC: 0.94 MU/L (ref 0.4–4)

## 2019-10-25 PROCEDURE — 84443 ASSAY THYROID STIM HORMONE: CPT | Performed by: INTERNAL MEDICINE

## 2019-10-25 PROCEDURE — 82533 TOTAL CORTISOL: CPT | Performed by: INTERNAL MEDICINE

## 2019-10-25 PROCEDURE — 36415 COLL VENOUS BLD VENIPUNCTURE: CPT | Performed by: INTERNAL MEDICINE

## 2019-11-26 ENCOUNTER — OFFICE VISIT (OUTPATIENT)
Dept: NEUROLOGY | Facility: CLINIC | Age: 59
End: 2019-11-26
Attending: INTERNAL MEDICINE
Payer: MEDICARE

## 2019-11-26 VITALS
HEART RATE: 67 BPM | WEIGHT: 134 LBS | RESPIRATION RATE: 16 BRPM | DIASTOLIC BLOOD PRESSURE: 69 MMHG | BODY MASS INDEX: 26.31 KG/M2 | SYSTOLIC BLOOD PRESSURE: 110 MMHG | OXYGEN SATURATION: 95 % | HEIGHT: 60 IN

## 2019-11-26 DIAGNOSIS — R68.89 SPELLS OF DECREASED ATTENTIVENESS: Primary | ICD-10-CM

## 2019-11-26 ASSESSMENT — PAIN SCALES - GENERAL: PAINLEVEL: NO PAIN (0)

## 2019-11-26 ASSESSMENT — MIFFLIN-ST. JEOR: SCORE: 1103.29

## 2019-11-26 NOTE — NURSING NOTE
Chief Complaint   Patient presents with     New Patient     UMP NEW SYNCOPE UNSPECIFIED SYNCOPE    Janeen Gamboa CMA

## 2019-11-26 NOTE — LETTER
RE: Valentine Vu  [address]  [place] [address]       Service Date: 2019      Sabrina Dunlap MD   [facility]   [address] [place] [address]      RE: Valentine Vu   MRN: [id number]   : 1960      Dear Dr. Dunlap:      Ms. 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  The patient is accompanied by her guardian as well as group home staff.  These episodes have been occurring since September of last year.  She has had a number of them.  During some of them, she has a tendency to get weak and fall, but she has had no injuries.  The first one in September was sudden.  The patient did not speak or respond, although the patient curiously remembers hands being waved in front of her face.  The episodes may last up to 3 minutes.  They have culminated in evaluations at [facility] and other places.  She did have a 72 hour EEG monitoring, which was unremarkable.  She has had about a total of 12 of these episodes of unresponsiveness.  At no point has the patient reported any aura.  There have not been any automatic movements of the mouth or face or any oral automatisms.  The patient denies any epigastric feelings.  There have been reports that during these episodes, she is warm to touch.  She did have one episode where her blood sugar was recorded low, and this apparently was not related to any of these episodes.  Flushing and a rash have been reported.  No history of hypertension.   She has had no fever with this as measured and no definite hypoglycemia except this erratic level.  The EEGs have been normal.  She had an MRI that was reviewed, and it was relatively normal.  She may have 3 episodes a day.  She was hospitalized at [facility] initially when she had the episodes, and that was a  negative workup.  Blood pressure has not been highly elevated during these episodes.  The patient does not have any history of psychological problems.  She exercises by walking, and this has been limited.  She is in a sheltered environment.  She does have anxiety reported, but no significant conflicts in the group home.      FAMILY HISTORY:  Negative for seizures, neurological events or other cognitive impairment.  She has not been tested for rare disorders.  She has had in the past medical history a diagnosis of major depression, in complete remission.  She has been reported to have syncope and seasonal allergies.  She denies essential hypertension.  She saw a psychologist, who felt that these were not functional episodes.      MEDICATIONS:  Currently consist of:   1. Keppra 250 b.i.d.  She was placed without any effect.   2. Celexa.   3. Donepezil 10 mg at bedtime.   4. Multivitamins.      REVIEW OF SYSTEMS:  Nothing else.      PHYSICAL EXAMINATION:  She is actually a pleasant woman.  She has thick glasses.  Staff addresses her courteously and professionally.  She had a MoCA test done, which she scored 26/30.  The patient has had a cardiac evaluation at [facility] and had a Zio patch.      There has been no increase in depression.      Her cranial nerve exam is normal.  She has good associated movements when she walks.  She is in good shape.  There is no significant tremor.  There may be a snout reflex, but there is no palmomental.  Visual fields appear grossly normal, though it is hard for her to track.  Reflexes are obtainable, hypoactive, symmetrical.  Sensory exam is normal.  Cerebellar is normal.      In summary, episodes of peculiar unresponsiveness without any real injury, but with some tendency to falling.  EEG, including the 72 hours, is normal.  She has a history of significant depression.  She is on Celexa.  I suspect a behavioral disorder.      We will proceed with trying to record this on video for  confirmation of diagnosis.  The patient has had a 72 hour ambulatory EEG.  The possibility of a video EEG admission is present.  She has had a cardiac evaluation and MRI of the brain, which were reviewed and negative.      We will try to capture one of these episodes.  They appear benign.  I would not favor continuing the anticonvulsant.  We will see her in 4 weeks and finish up the workup.  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      Sincerely,      Rocael Kumar MD      cc:   [address] [address]      D: 2019   T: 2019   MT: amy      Name:     JOYCE CAMACHO   MRN:      [id number]        Account:      [id number]   :      1960           Service Date: 2019      Document: [id number]

## 2019-12-03 NOTE — PROGRESS NOTES
Service Date: 2019      Sabrina Dunlap MD   [facility] [address] [place] [address]      RE: Valentine Vu   MRN: [id number]   : 1960      Dear Dr. Dunlap:      Ms. 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  The patient is accompanied by her guardian as well as group home staff.  These episodes have been occurring since September of last year.  She has had a number of them.  During some of them, she has a tendency to get weak and fall, but she has had no injuries.  The first one in September was sudden.  The patient did not speak or respond, although the patient curiously remembers hands being waved in front of her face.  The episodes may last up to 3 minutes.  They have culminated in evaluations at [facility] and other places.  She did have a 72 hour EEG monitoring, which was unremarkable.  She has had about a total of 12 of these episodes of unresponsiveness.  At no point has the patient reported any aura.  There have not been any automatic movements of the mouth or face or any oral automatisms.  The patient denies any epigastric feelings.  There have been reports that during these episodes, she is warm to touch.  She did have one episode where her blood sugar was recorded low, and this apparently was not related to any of these episodes.  Flushing and a rash have been reported.  No history of hypertension.   She has had no fever with this as measured and no definite hypoglycemia except this erratic level.  The EEGs have been normal.  She had an MRI that was reviewed, and it was relatively normal.  She may have 3 episodes a day.  She was hospitalized at [facility] initially when she had the episodes, and that was a negative workup.  Blood pressure has not been highly elevated during  these episodes.  The patient does not have any history of psychological problems.  She exercises by walking, and this has been limited.  She is in a sheltered environment.  She does have anxiety reported, but no significant conflicts in the group home.      FAMILY HISTORY:  Negative for seizures, neurological events or other cognitive impairment.  She has not been tested for rare disorders.  She has had in the past medical history a diagnosis of major depression, in complete remission.  She has been reported to have syncope and seasonal allergies.  She denies essential hypertension.  She saw a psychologist, who felt that these were not functional episodes.      MEDICATIONS:  Currently consist of:   1. Keppra 250 b.i.d.  She was placed without any effect.   2. Celexa.   3. Donepezil 10 mg at bedtime.   4. Multivitamins.      REVIEW OF SYSTEMS:  Nothing else.      PHYSICAL EXAMINATION:  She is actually a pleasant woman.  She has thick glasses.  Staff addresses her courteously and professionally.  She had a MoCA test done, which she scored 26/30.  The patient has had a cardiac evaluation at [facility] and had a Zio patch.      There has been no increase in depression.      Her cranial nerve exam is normal.  She has good associated movements when she walks.  She is in good shape.  There is no significant tremor.  There may be a snout reflex, but there is no palmomental.  Visual fields appear grossly normal, though it is hard for her to track.  Reflexes are obtainable, hypoactive, symmetrical.  Sensory exam is normal.  Cerebellar is normal.      In summary, episodes of peculiar unresponsiveness without any real injury, but with some tendency to falling.  EEG, including the 72 hours, is normal.  She has a history of significant depression.  She is on Celexa.  I suspect a behavioral disorder.      We will proceed with trying to record this on video for confirmation of diagnosis.  The patient has had a 72 hour ambulatory EEG.   The possibility of a video EEG admission is present.  She has had a cardiac evaluation and MRI of the brain, which were reviewed and negative.      We will try to capture one of these episodes.  They appear benign.  I would not favor continuing the anticonvulsant.  We will see her in 4 weeks and finish up the workup.  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      Sincerely,      Abhay Yanez MD      cc:   [place] [address]            ABHAY YANEZ MD             D: 2019   T: 2019   MT: amy      Name:     JOYCE CAMACHO   MRN:      [id number]        Account:      [id number]   :      1960           Service Date: 2019      Document: [id number]

## 2020-01-08 ENCOUNTER — OFFICE VISIT (OUTPATIENT)
Dept: INTERNAL MEDICINE | Facility: CLINIC | Age: 60
End: 2020-01-08
Payer: MEDICARE

## 2020-01-08 VITALS
DIASTOLIC BLOOD PRESSURE: 60 MMHG | BODY MASS INDEX: 26.5 KG/M2 | SYSTOLIC BLOOD PRESSURE: 102 MMHG | HEART RATE: 65 BPM | WEIGHT: 135 LBS | HEIGHT: 60 IN | OXYGEN SATURATION: 97 % | TEMPERATURE: 97.4 F | RESPIRATION RATE: 12 BRPM

## 2020-01-08 DIAGNOSIS — L50.1 IDIOPATHIC URTICARIA: Primary | ICD-10-CM

## 2020-01-08 DIAGNOSIS — Z00.00 ROUTINE GENERAL MEDICAL EXAMINATION AT A HEALTH CARE FACILITY: Primary | ICD-10-CM

## 2020-01-08 DIAGNOSIS — T78.3XXD ANGIONEUROTIC EDEMA, SUBSEQUENT ENCOUNTER: ICD-10-CM

## 2020-01-08 DIAGNOSIS — R23.2 FLUSHING: ICD-10-CM

## 2020-01-08 DIAGNOSIS — T78.2XXD DRUG-INDUCED ANAPHYLAXIS, SUBSEQUENT ENCOUNTER: ICD-10-CM

## 2020-01-08 DIAGNOSIS — R21 RASH AND OTHER NONSPECIFIC SKIN ERUPTION: ICD-10-CM

## 2020-01-08 DIAGNOSIS — T50.905D DRUG-INDUCED ANAPHYLAXIS, SUBSEQUENT ENCOUNTER: ICD-10-CM

## 2020-01-08 DIAGNOSIS — Z12.31 VISIT FOR SCREENING MAMMOGRAM: ICD-10-CM

## 2020-01-08 LAB
ALBUMIN UR-MCNC: NEGATIVE MG/DL
APPEARANCE UR: CLEAR
BACTERIA #/AREA URNS HPF: ABNORMAL /HPF
BASOPHILS # BLD AUTO: 0 10E9/L (ref 0–0.2)
BASOPHILS NFR BLD AUTO: 0.4 %
BILIRUB UR QL STRIP: NEGATIVE
COLOR UR AUTO: YELLOW
DIFFERENTIAL METHOD BLD: ABNORMAL
EOSINOPHIL # BLD AUTO: 0.3 10E9/L (ref 0–0.7)
EOSINOPHIL NFR BLD AUTO: 4.9 %
ERYTHROCYTE [DISTWIDTH] IN BLOOD BY AUTOMATED COUNT: 13.8 % (ref 10–15)
GLUCOSE UR STRIP-MCNC: NEGATIVE MG/DL
HCT VFR BLD AUTO: 45.3 % (ref 35–47)
HGB BLD-MCNC: 14.9 G/DL (ref 11.7–15.7)
HGB UR QL STRIP: NEGATIVE
KETONES UR STRIP-MCNC: NEGATIVE MG/DL
LEUKOCYTE ESTERASE UR QL STRIP: ABNORMAL
LYMPHOCYTES # BLD AUTO: 1.7 10E9/L (ref 0.8–5.3)
LYMPHOCYTES NFR BLD AUTO: 33.1 %
MCH RBC QN AUTO: 28.4 PG (ref 26.5–33)
MCHC RBC AUTO-ENTMCNC: 32.9 G/DL (ref 31.5–36.5)
MCV RBC AUTO: 87 FL (ref 78–100)
MONOCYTES # BLD AUTO: 0.6 10E9/L (ref 0–1.3)
MONOCYTES NFR BLD AUTO: 11.3 %
NEUTROPHILS # BLD AUTO: 2.6 10E9/L (ref 1.6–8.3)
NEUTROPHILS NFR BLD AUTO: 50.3 %
NITRATE UR QL: NEGATIVE
PH UR STRIP: 5 PH (ref 5–7)
PLATELET # BLD AUTO: 290 10E9/L (ref 150–450)
RBC # BLD AUTO: 5.24 10E12/L (ref 3.8–5.2)
RBC #/AREA URNS AUTO: ABNORMAL /HPF
SOURCE: ABNORMAL
SP GR UR STRIP: 1.02 (ref 1–1.03)
UROBILINOGEN UR STRIP-ACNC: 0.2 EU/DL (ref 0.2–1)
WBC # BLD AUTO: 5.1 10E9/L (ref 4–11)
WBC #/AREA URNS AUTO: ABNORMAL /HPF

## 2020-01-08 PROCEDURE — 82784 ASSAY IGA/IGD/IGG/IGM EACH: CPT | Performed by: ALLERGY & IMMUNOLOGY

## 2020-01-08 PROCEDURE — 85025 COMPLETE CBC W/AUTO DIFF WBC: CPT | Performed by: INTERNAL MEDICINE

## 2020-01-08 PROCEDURE — 86160 COMPLEMENT ANTIGEN: CPT | Performed by: ALLERGY & IMMUNOLOGY

## 2020-01-08 PROCEDURE — 81001 URINALYSIS AUTO W/SCOPE: CPT | Performed by: INTERNAL MEDICINE

## 2020-01-08 PROCEDURE — 99396 PREV VISIT EST AGE 40-64: CPT | Performed by: INTERNAL MEDICINE

## 2020-01-08 PROCEDURE — 99000 SPECIMEN HANDLING OFFICE-LAB: CPT | Performed by: ALLERGY & IMMUNOLOGY

## 2020-01-08 PROCEDURE — 86162 COMPLEMENT TOTAL (CH50): CPT | Mod: 90 | Performed by: ALLERGY & IMMUNOLOGY

## 2020-01-08 PROCEDURE — 80061 LIPID PANEL: CPT | Performed by: INTERNAL MEDICINE

## 2020-01-08 PROCEDURE — 36415 COLL VENOUS BLD VENIPUNCTURE: CPT | Performed by: INTERNAL MEDICINE

## 2020-01-08 PROCEDURE — 80053 COMPREHEN METABOLIC PANEL: CPT | Performed by: INTERNAL MEDICINE

## 2020-01-08 PROCEDURE — 83520 IMMUNOASSAY QUANT NOS NONAB: CPT | Performed by: ALLERGY & IMMUNOLOGY

## 2020-01-08 PROCEDURE — 82955 ASSAY OF G6PD ENZYME: CPT | Mod: 90 | Performed by: ALLERGY & IMMUNOLOGY

## 2020-01-08 PROCEDURE — 86160 COMPLEMENT ANTIGEN: CPT | Mod: 90 | Performed by: ALLERGY & IMMUNOLOGY

## 2020-01-08 RX ORDER — CYCLOSPORINE 100 MG/1
100 CAPSULE, LIQUID FILLED ORAL 2 TIMES DAILY
COMMUNITY

## 2020-01-08 ASSESSMENT — ENCOUNTER SYMPTOMS
DYSURIA: 0
SHORTNESS OF BREATH: 0
HEADACHES: 0
FREQUENCY: 1
WEAKNESS: 0
PALPITATIONS: 0
DIARRHEA: 1
EYE PAIN: 0
CONSTIPATION: 0
HEARTBURN: 0
DIZZINESS: 0
MYALGIAS: 0
HEMATURIA: 0
ARTHRALGIAS: 0
HEMATOCHEZIA: 0
SORE THROAT: 0
NAUSEA: 0
CHILLS: 0
ABDOMINAL PAIN: 0
NERVOUS/ANXIOUS: 0
JOINT SWELLING: 0
FEVER: 0
PARESTHESIAS: 1
COUGH: 0

## 2020-01-08 ASSESSMENT — MIFFLIN-ST. JEOR: SCORE: 1107.83

## 2020-01-08 ASSESSMENT — ACTIVITIES OF DAILY LIVING (ADL): CURRENT_FUNCTION: NO ASSISTANCE NEEDED

## 2020-01-08 NOTE — LETTER
January 10, 2020      Valentine Vu  [address] [place] [address]        Dear ,    We are writing to inform you of your test results.    The cholesterol level LDL is close to goal.   The kidney function (GFR) and liver function tests (AST and ALT) are within normal limits.     The fasting glucose is within normal limits.   The blood counts (CBC) are within normal limits.   The urine is not significant for a UTI     Resulted Orders   Comprehensive metabolic panel   Result Value Ref Range    Sodium 137 133 - 144 mmol/L    Potassium 4.1 3.4 - 5.3 mmol/L    Chloride 104 94 - 109 mmol/L    Carbon Dioxide 29 20 - 32 mmol/L    Anion Gap 4 3 - 14 mmol/L    Glucose 90 70 - 99 mg/dL    Urea Nitrogen 17 7 - 30 mg/dL    Creatinine 0.52 0.52 - 1.04 mg/dL    GFR Estimate >90 >60 mL/min/[1.73_m2]      Comment:      Non  GFR Calc  Starting 12/18/2018, serum creatinine based estimated GFR (eGFR) will be   calculated using the Chronic Kidney Disease Epidemiology Collaboration   (CKD-EPI) equation.      GFR Estimate If Black >90 >60 mL/min/[1.73_m2]      Comment:       GFR Calc  Starting 12/18/2018, serum creatinine based estimated GFR (eGFR) will be   calculated using the Chronic Kidney Disease Epidemiology Collaboration   (CKD-EPI) equation.      Calcium 8.9 8.5 - 10.1 mg/dL    Bilirubin Total 1.0 0.2 - 1.3 mg/dL    Albumin 3.8 3.4 - 5.0 g/dL    Protein Total 6.8 6.8 - 8.8 g/dL    Alkaline Phosphatase 70 40 - 150 U/L    ALT 22 0 - 50 U/L    AST 13 0 - 45 U/L   CBC with platelets differential   Result Value Ref Range    WBC 5.1 4.0 - 11.0 10e9/L    RBC Count 5.24 (H) 3.8 - 5.2 10e12/L    Hemoglobin 14.9 11.7 - 15.7 g/dL    Hematocrit 45.3 35.0 - 47.0 %    MCV 87 78 - 100 fl    MCH 28.4 26.5 - 33.0 pg    MCHC 32.9 31.5 - 36.5 g/dL    RDW 13.8 10.0 - 15.0 %    Platelet Count 290 150 - 450 10e9/L    % Neutrophils 50.3 %    % Lymphocytes 33.1 %    % Monocytes 11.3 %    % Eosinophils 4.9  %    % Basophils 0.4 %    Absolute Neutrophil 2.6 1.6 - 8.3 10e9/L    Absolute Lymphocytes 1.7 0.8 - 5.3 10e9/L    Absolute Monocytes 0.6 0.0 - 1.3 10e9/L    Absolute Eosinophils 0.3 0.0 - 0.7 10e9/L    Absolute Basophils 0.0 0.0 - 0.2 10e9/L    Diff Method Automated Method    Lipid panel reflex to direct LDL Fasting   Result Value Ref Range    Cholesterol 205 (H) <200 mg/dL      Comment:      Desirable:       <200 mg/dl    Triglycerides 144 <150 mg/dL    HDL Cholesterol 65 >49 mg/dL    LDL Cholesterol Calculated 111 (H) <100 mg/dL      Comment:      Above desirable:  100-129 mg/dl  Borderline High:  130-159 mg/dL  High:             160-189 mg/dL  Very high:       >189 mg/dl      Non HDL Cholesterol 140 (H) <130 mg/dL      Comment:      Above Desirable:  130-159 mg/dl  Borderline high:  160-189 mg/dl  High:             190-219 mg/dl  Very high:       >219 mg/dl     UA with Microscopic reflex to Culture   Result Value Ref Range    Color Urine Yellow     Appearance Urine Clear     Glucose Urine Negative NEG^Negative mg/dL    Bilirubin Urine Negative NEG^Negative    Ketones Urine Negative NEG^Negative mg/dL    Specific Gravity Urine 1.025 1.003 - 1.035    pH Urine 5.0 5.0 - 7.0 pH    Protein Albumin Urine Negative NEG^Negative mg/dL    Urobilinogen Urine 0.2 0.2 - 1.0 EU/dL    Nitrite Urine Negative NEG^Negative    Blood Urine Negative NEG^Negative    Leukocyte Esterase Urine Trace (A) NEG^Negative    Source Midstream Urine     WBC Urine 0 - 5 OTO5^0 - 5 /HPF    RBC Urine O - 2 OTO2^O - 2 /HPF    Bacteria Urine Few (A) NEG^Negative /HPF       If you have any questions or concerns, please call the clinic at the number listed above.       Sincerely,        Sabrina Dunlap MD

## 2020-01-08 NOTE — NURSING NOTE
"/60   Pulse 65   Temp 97.4  F (36.3  C) (Oral)   Resp 12   Ht 1.53 m (5' 0.25\")   Wt 61.2 kg (135 lb)   LMP  (LMP Unknown)   SpO2 97%   Breastfeeding No   BMI 26.15 kg/m      "

## 2020-01-08 NOTE — PROGRESS NOTES
"   SUBJECTIVE:   CC: Valentine Vu is an 59 year old woman who presents for preventive health visit.     Healthy Habits:     In general, how would you rate your overall health?  Excellent    Frequency of exercise:  None    Do you usually eat at least 4 servings of fruit and vegetables a day, include whole grains    & fiber and avoid regularly eating high fat or \"junk\" foods?  Yes    Taking medications regularly:  Yes    Medication side effects:  Not applicable    Ability to successfully perform activities of daily living:  No assistance needed    Home Safety:  No safety concerns identified    Hearing Impairment:  Difficult to understand a speaker at a public meeting or Jain service    In the past 6 months, have you been bothered by leaking of urine?  No    In general, how would you rate your overall mental or emotional health?  Excellent      PHQ-2 Total Score: 0    Additional concerns today:  No    Syncopal episodes. She had seen neurology in 11/2019.  Recommendations were to try off of the keppra.  The patient had returned from [place]- 28th of December.    Today's PHQ-2 Score:   PHQ-2 ( 1999 Pf[employer]) 1/8/2020   Q1: Little interest or pleasure in doing things 0   Q2: Feeling down, depressed or hopeless 0   PHQ-2 Score 0   Q1: Little interest or pleasure in doing things Not at all   Q2: Feeling down, depressed or hopeless Not at all   PHQ-2 Score 0       Abuse: Current or Past(Physical, Sexual or Emotional)- No  Do you feel safe in your environment? Yes        Social History     Tobacco Use     Smoking status: Never Smoker     Smokeless tobacco: Never Used   Substance Use Topics     Alcohol use: No     If you drink alcohol do you typically have >3 drinks per day or >7 drinks per week? No    Alcohol Use 1/8/2020   Prescreen: >3 drinks/day or >7 drinks/week? Not Applicable   Prescreen: >3 drinks/day or >7 drinks/week? -       Reviewed orders with patient.  Reviewed health maintenance and updated orders accordingly " - Yes      Mammogram Screening: Patient over age 50, mutual decision to screen reflected in health maintenance.    Pertinent mammograms are reviewed under the imaging tab.  History of abnormal Pap smear: NO - age 30- 65 PAP every 3 years recommended  PAP / HPV Latest Ref Rng & Units 2/22/2019   PAP - NIL   HPV 16 DNA NEG:Negative Negative   HPV 18 DNA NEG:Negative Negative   OTHER HR HPV NEG:Negative Negative     Reviewed and updated as needed this visit by clinical staff  Tobacco  Allergies  Meds  Problems  Med Hx  Surg Hx  Fam Hx  Soc Hx          Reviewed and updated as needed this visit by Provider  Allergies  Problems            Review of Systems   Constitutional: Negative for chills and fever.   HENT: Negative for congestion, ear pain and sore throat.    Eyes: Negative for pain and visual disturbance.   Respiratory: Negative for cough and shortness of breath.    Cardiovascular: Negative for chest pain, palpitations and peripheral edema.   Gastrointestinal: Positive for diarrhea. Negative for abdominal pain, constipation, heartburn, hematochezia and nausea.   Genitourinary: Positive for frequency and urgency. Negative for dysuria, genital sores and hematuria.   Musculoskeletal: Negative for arthralgias, joint swelling and myalgias.   Skin: Negative for rash.   Neurological: Positive for paresthesias. Negative for dizziness, weakness and headaches.   Psychiatric/Behavioral: Negative for mood changes. The patient is not nervous/anxious.      CONSTITUTIONAL: NEGATIVE for fever, chills, change in weight  INTEGUMENTARY/SKIN: NEGATIVE for worrisome rashes, moles or lesions  EYES: NEGATIVE for vision changes or irritation  ENT: NEGATIVE for ear, mouth and throat problems  RESP: NEGATIVE for significant cough or SOB  BREAST: NEGATIVE for masses, tenderness or discharge  CV: NEGATIVE for chest pain, palpitations or peripheral edema  GI: NEGATIVE for nausea, abdominal pain, heartburn, or change in bowel  "habits  : NEGATIVE for unusual urinary or vaginal symptoms. No vaginal bleeding.  MUSCULOSKELETAL: NEGATIVE for significant arthralgias or myalgia  NEURO: NEGATIVE for weakness, dizziness or paresthesias  PSYCHIATRIC: NEGATIVE for changes in mood or affect     1 episode of diarrhea this morning. No nausea/vomiting.  No blood in stool. No abdominal pain    Urinary symptoms.  She drinks a lot fluids. She has had urinary frequency for the past week.  No pain when urinate.  No fevers or chills.      OBJECTIVE:   /60   Pulse 65   Temp 97.4  F (36.3  C) (Oral)   Resp 12   Ht 1.53 m (5' 0.25\")   Wt 61.2 kg (135 lb)   LMP  (LMP Unknown)   SpO2 97%   Breastfeeding No   BMI 26.15 kg/m    Physical Exam  GENERAL: healthy, alert and no distress  EYES: Eyes grossly normal to inspection, PERRL and conjunctivae and sclerae normal  HENT: ear canals and TM's normal, nose and mouth without ulcers or lesions  NECK: no adenopathy, no asymmetry, masses, or scars and thyroid normal to palpation  RESP: lungs clear to auscultation - no rales, rhonchi or wheezes  BREAST: normal without masses, tenderness or nipple discharge and no palpable axillary masses or adenopathy  CV: regular rate and rhythm, normal S1 S2, no S3 or S4, no murmur, click or rub, no peripheral edema and peripheral pulses strong  ABDOMEN: soft, nontender, no hepatosplenomegaly, no masses and bowel sounds normal  MS: no gross musculoskeletal defects noted, no edema  SKIN: no suspicious lesions or rashes  NEURO: Normal strength and tone, mentation intact and speech normal  PSYCH: mentation appears normal, affect normal/bright    Diagnostic Test Results:  Labs reviewed in Epic    ASSESSMENT/PLAN:       ICD-10-CM    1. Routine general medical examination at a health care facility Z00.00 Comprehensive metabolic panel     CBC with platelets differential     Lipid panel reflex to direct LDL Fasting     UA with Microscopic reflex to Culture   2. Visit for " "screening mammogram Z12.31 *MA Screening Digital Bilateral       COUNSELING:  Reviewed preventive health counseling, as reflected in patient instructions       Regular exercise       Healthy diet/nutrition    Estimated body mass index is 26.15 kg/m  as calculated from the following:    Height as of this encounter: 1.53 m (5' 0.25\").    Weight as of this encounter: 61.2 kg (135 lb).         reports that she has never smoked. She has never used smokeless tobacco.      Counseling Resources:  ATP IV Guidelines  Pooled Cohorts Equation Calculator  Breast Cancer Risk Calculator  FRAX Risk Assessment  ICSI Preventive Guidelines  Dietary Guidelines for Americans, 2010  USDA's MyPlate  ASA Prophylaxis  Lung CA Screening    Sabrina Dunlap MD  [facility]  "

## 2020-01-09 LAB
ALBUMIN SERPL-MCNC: 3.8 G/DL (ref 3.4–5)
ALP SERPL-CCNC: 70 U/L (ref 40–150)
ALT SERPL W P-5'-P-CCNC: 22 U/L (ref 0–50)
ANION GAP SERPL CALCULATED.3IONS-SCNC: 4 MMOL/L (ref 3–14)
AST SERPL W P-5'-P-CCNC: 13 U/L (ref 0–45)
BILIRUB SERPL-MCNC: 1 MG/DL (ref 0.2–1.3)
BUN SERPL-MCNC: 17 MG/DL (ref 7–30)
C4 SERPL-MCNC: 29 MG/DL (ref 13–39)
CALCIUM SERPL-MCNC: 8.9 MG/DL (ref 8.5–10.1)
CHLORIDE SERPL-SCNC: 104 MMOL/L (ref 94–109)
CHOLEST SERPL-MCNC: 205 MG/DL
CO2 SERPL-SCNC: 29 MMOL/L (ref 20–32)
CREAT SERPL-MCNC: 0.52 MG/DL (ref 0.52–1.04)
G6PD RBC-CCNC: 12.5 U/G HB (ref 9.9–16.6)
GFR SERPL CREATININE-BSD FRML MDRD: >90 ML/MIN/{1.73_M2}
GLUCOSE SERPL-MCNC: 90 MG/DL (ref 70–99)
HDLC SERPL-MCNC: 65 MG/DL
IGG SERPL-MCNC: 853 MG/DL (ref 610–1616)
IGM SERPL-MCNC: 65 MG/DL (ref 35–242)
LDLC SERPL CALC-MCNC: 111 MG/DL
NONHDLC SERPL-MCNC: 140 MG/DL
POTASSIUM SERPL-SCNC: 4.1 MMOL/L (ref 3.4–5.3)
PROT SERPL-MCNC: 6.8 G/DL (ref 6.8–8.8)
SODIUM SERPL-SCNC: 137 MMOL/L (ref 133–144)
TRIGL SERPL-MCNC: 144 MG/DL

## 2020-01-10 LAB
CH50 SERPL-ACNC: 105 CAE UNITS (ref 60–144)
TRYPTASE SERPL-MCNC: 6.1 UG/L

## 2020-01-14 LAB — C1Q SERPL-MCNC: 165 UG/ML (ref 109–242)

## 2020-01-16 ENCOUNTER — FCC EXTENDED DOCUMENTATION (OUTPATIENT)
Dept: PSYCHOLOGY | Facility: CLINIC | Age: 60
End: 2020-01-16

## 2020-01-16 ENCOUNTER — OFFICE VISIT (OUTPATIENT)
Dept: PSYCHOLOGY | Facility: CLINIC | Age: 60
End: 2020-01-16
Payer: MEDICARE

## 2020-01-16 DIAGNOSIS — F41.1 GENERALIZED ANXIETY DISORDER: Primary | ICD-10-CM

## 2020-01-16 PROCEDURE — 90834 PSYTX W PT 45 MINUTES: CPT | Performed by: SOCIAL WORKER

## 2020-01-16 ASSESSMENT — ANXIETY QUESTIONNAIRES
5. BEING SO RESTLESS THAT IT IS HARD TO SIT STILL: NEARLY EVERY DAY
GAD7 TOTAL SCORE: 10
6. BECOMING EASILY ANNOYED OR IRRITABLE: NOT AT ALL
1. FEELING NERVOUS, ANXIOUS, OR ON EDGE: NEARLY EVERY DAY
3. WORRYING TOO MUCH ABOUT DIFFERENT THINGS: SEVERAL DAYS
7. FEELING AFRAID AS IF SOMETHING AWFUL MIGHT HAPPEN: NOT AT ALL
4. TROUBLE RELAXING: MORE THAN HALF THE DAYS
2. NOT BEING ABLE TO STOP OR CONTROL WORRYING: SEVERAL DAYS
IF YOU CHECKED OFF ANY PROBLEMS ON THIS QUESTIONNAIRE, HOW DIFFICULT HAVE THESE PROBLEMS MADE IT FOR YOU TO DO YOUR WORK, TAKE CARE OF THINGS AT HOME, OR GET ALONG WITH OTHER PEOPLE: NOT DIFFICULT AT ALL

## 2020-01-16 ASSESSMENT — COLUMBIA-SUICIDE SEVERITY RATING SCALE - C-SSRS
1. IN THE PAST MONTH, HAVE YOU WISHED YOU WERE DEAD OR WISHED YOU COULD GO TO SLEEP AND NOT WAKE UP?: NO
6. HAVE YOU EVER DONE ANYTHING, STARTED TO DO ANYTHING, OR PREPARED TO DO ANYTHING TO END YOUR LIFE?: NO
6. HAVE YOU EVER DONE ANYTHING, STARTED TO DO ANYTHING, OR PREPARED TO DO ANYTHING TO END YOUR LIFE?: NO
ATTEMPT LIFETIME: NO
TOTAL  NUMBER OF INTERRUPTED ATTEMPTS PAST 3 MONTHS: NO
ATTEMPT PAST THREE MONTHS: NO
TOTAL  NUMBER OF ABORTED OR SELF INTERRUPTED ATTEMPTS PAST 3 MONTHS: NO
2. HAVE YOU ACTUALLY HAD ANY THOUGHTS OF KILLING YOURSELF?: NO
1. IN THE PAST MONTH, HAVE YOU WISHED YOU WERE DEAD OR WISHED YOU COULD GO TO SLEEP AND NOT WAKE UP?: NO
TOTAL  NUMBER OF ABORTED OR SELF INTERRUPTED ATTEMPTS PAST LIFETIME: NO
2. HAVE YOU ACTUALLY HAD ANY THOUGHTS OF KILLING YOURSELF LIFETIME?: NO
TOTAL  NUMBER OF INTERRUPTED ATTEMPTS LIFETIME: NO

## 2020-01-16 ASSESSMENT — PATIENT HEALTH QUESTIONNAIRE - PHQ9: SUM OF ALL RESPONSES TO PHQ QUESTIONS 1-9: 7

## 2020-01-16 NOTE — PROGRESS NOTES
"                                                                                                                                                                        Adult Intake Structured Interview  Standard Diagnostic Assessment      CLIENT'S NAME: Valentine Vu  MRN:   [id number]  :   1960  ACCT. NUMBER: [id number]  DATE OF SERVICE: 2020  VIDEO VISIT: No    Identifying Information:  Client is a 59 year old, , single female. Client was referred for counseling by Dr. Mohamud Valencia, previous individual therapy. Client is currently  employed part time and reports she is able to function appropriately at work. and disabled. Client attended the first half of the session with a worker from her group home,  the second half of the session client met with therapist alone.      Client's Statement of Presenting Concern:  Client reports the reason for seeking therapy at this time as needing to become established with a new therapist.  It was reported that her previous therapist was transitioning to private practice.  Client stated that her symptoms have resulted in the following functional impairments: management of the household and or completion of tasks and relationship(s)      History of Presenting Concern:  Client's worker from her group home reports that these problem(s) have occurred since she moved into their home 1 1/2 years ago. Client has attempted to resolve these concerns in the past through individual therapy, medication . Client reports that other professional(s) are involved in providing support / services. Psychiatry, [facility] , and attends [employer].      Social History:  Client reported she grew up in [place], [place]. They were the second born of five children. This is an intact family and parents remain . Client reported that her childhood was \"good\". Client described her current relationships with family of origin as \"really good\".    Client reported a history " "of no committed relationships or marriages.  Client reported having no children. Client identified some stable and meaningful social connections. Client reported positive relationships with her housemates.  Client reported that she has not been involved with the legal system. Client does have a legal guardian who is her sister, [place]. Client's highest education level was high school graduate. Client did identify the following learning problems: attention, concentration and hearing. There are no ethnic, cultural or Voodoo factors that may be relevant for therapy. Client identified her preferred language to be English. Client reported she does not need the assistance of an  or other support involved in therapy. Modifications will not be used to assist communication in therapy. Client did not serve in the .     Client reports family history includes Leukemia in her brother.    Mental Health History:  Client is unaware of family mental health history.  Client previously received the following mental health diagnosis: Anxiety.  Client has received the following mental health services in the past: counseling and psychiatry.  Hospitalizations: None.  Client is currently receiving the following services: psychiatry and county .  Client attends [employer].    Chemical Health History:  Client is unaware of family chemical health history. Client has not received chemical dependency treatment in the past. Client is not currently receiving any chemical dependency treatment. Client reports no problems as a result of their drinking / drug use.    Client Reports:  Client denies using alcohol.  Client denies using tobacco.  Client denies using marijuana.  Client reports using caffeine 1 times per day and drinks 1-2 cups at a time. Patient first started using caffeine \"years\" ago.  Client denies using street drugs.  Client denies the non-medical use of prescription or over the counter " drugs.    CAGE: None of the patient's responses to the CAGE screening were positive / Negative CAGE score   Based on the negative Cage-Aid score and clinical interview there  are not indications of drug or alcohol abuse.    Significant Losses / Trauma / Abuse / Neglect Issues:  There are indications or report of significant loss, trauma, abuse or neglect issues related to: death of brother when client was in her 20's.    Issues of possible neglect are not present.      Medical Issues:  Client has had a physical exam to rule out medical causes for current symptoms. Date of last physical exam was within the past year. Client was encouraged to follow up with PCP if symptoms were to develop. The client has a [place] Primary Care Provider, who is named Sabrina Dunlap.. The client has a psychiatrist whose name and location are: Dr. Mix in [place]. Client reports no current medical concerns. Client has been having seizure like episodes that she has been seeing medical providers to complete diagnostics.  It was reported that the nature of these episodes continues to be unknown.  The client denies the presence of chronic or episodic pain. There are not significant nutritional concerns.     Patient reports current meds as:   See list in Epic    Client Allergies:  No Known Allergies    Medical History:  Past Medical History:   Diagnosis Date     ADHD      Dementia (H)      Depressive disorder        Medication Adherence:  Client reports taking prescribed medications as prescribed.    Mental Status Assessment:  Appearance:   Appropriate   Eye Contact:   Fair   Psychomotor Behavior: Restless -leg movement  Attitude:   Cooperative   Orientation:   All  Speech   Rate / Production: Normal    Volume:  Normal   Mood:    Anxious   Affect:    Appropriate   Thought Content:  Clear   Thought Form:  Coherent  Logical   Insight:    Fair       Review of Symptoms:  Depression: Sleep Energy Concentration Appetite  Suzy:  No  "symptoms  Psychosis: No symptoms  Anxiety: Worries Nervousness Trouble Relaxing Triggers: change in routine, getting things done, getting along with staff  Panic:  No symptoms  Post Traumatic Stress Disorder: No symptoms  Obsessive Compulsive Disorder: No symptoms  Eating Disorder: No symptoms  Oppositional Defiant Disorder: No symptoms  ADD / ADHD: Attention  Conduct Disorder: No symptoms      Safety Assessment:    History of Safety Concerns:   Client denied a history of suicidal ideation.    Client denied a history of suicide attempts.    Client denied a history of homicidal ideation.    Client denied a history of self-injurious ideation and behaviors.    Client denied a history of personal safety concerns.    Client denied a history of assaultive behaviors.        Current Safety Concerns:  Client denies current suicidal ideation.    Client denies current homicidal ideation and behaviors.  Client denies current self-injurious ideation and behaviors.    Client denies current concerns for personal safety.    Client reports the following protective factors: positive relationships positive social network and positive family connections    Client reports there are no firearms in the house.     Plan for Safety and Risk Management:  Recommended that patient call 911 or go to the local ED should there be a change in any of these risk factors.    Client's Strengths and Limitations:  Client identified the following strengths or resources that will help her succeed in counseling: family support and staff and housemates. Client identified the following supports: family and staff and housemates. Things that may interfere with the client's success in counseling include: \"maybe not taking advice\".      Diagnostic Criteria:  A. Excessive anxiety and worry about a number of events or activities (such as work or school performance).   B. The person finds it difficult to control the worry.  C. Select 3 or more symptoms (required for " diagnosis). Only one item is required in children.   - Restlessness or feeling keyed up or on edge.    - Difficulty concentrating or mind going blank.    - Sleep disturbance (difficulty falling or staying asleep, or restless unsatisfying sleep).   E. The anxiety, worry, or physical symptoms cause clinically significant distress or impairment in social, occupational, or other important areas of functioning.   F. The disturbance is not due to the direct physiological effects of a substance (e.g., a drug of abuse, a medication) or a general medical condition (e.g., hyperthyroidism) and does not occur exclusively during a Mood Disorder, a Psychotic Disorder, or a Pervasive Developmental Disorder.      Functional Status:  Client's symptoms are causing reduced functional status in the following areas: Social / Relational - symptoms can impact relationships  Difficulty completing tasks due to lack of concentration      DSM5 Diagnoses: (Sustained by DSM5 Criteria Listed Above)  Diagnoses: 300.02 (F41.1) Generalized Anxiety Disorder  Psychosocial & Contextual Factors: lives in a group home, has legal guardian  WHODAS 2.0 (12 item) 14    Attendance Agreement:  Client has signed Attendance Agreement:Yes      Collaboration:  Collaboration / coordination with other professionals is not indicated at this time.      Preliminary Treatment Plan:  The client reports no currently identified Jain, ethnic or cultural issues relevant to therapy.     services are not indicated.    Modifications to assist communication are not indicated.    The concerns identified by the client will be addressed in therapy.    Initial Treatment will focus on: Anxiety - alleviate symptoms, increase coping strategies.    As a preliminary treatment goal, client will experience a reduction in anxiety and will develop more effective coping skills to manage anxiety symptoms.    The focus of initial interventions will be to alleviate anxiety and  increase coping skills.    Referral to another professional/service is not indicated at this time..    A Release of Information is not needed at this time.    Report to child / adult protection services was NA.    Patient will have open access to their mental health medical record.    YAHAIRA Olson  January 23, 2020

## 2020-01-16 NOTE — Clinical Note
I met with client today and will complete the remainder of the diagnostic assessment next week.  I gave her a ORESTES diagnosis.

## 2020-01-16 NOTE — PROGRESS NOTES
Progress Note - Initial Session    Client Name:  Valentine Vu Date: 1/16/2020         Service Type: Individual   Video Visit: No     Session Start Time: 10:00 AM  Session End Time: 10:45 AM     Session Length: 38-52 minutes    Session #: 1    Attendees: Individual with group home staff present for first half     DATA:  Diagnostic Assessment in progress.  Unable to complete documentation at the conclusion of the first session due to completion of ORESTES-7, PHQ-9, and [place], obtaining information from staff and client    Interactive Complexity: No  Crisis: No    Intervention:  Building rapport, provided education on therapeutic process    ASSESSMENT:  Mental Status Assessment:  Appearance:   Appropriate   Eye Contact:   Fair   Psychomotor Behavior: Restless   Attitude:   Cooperative   Orientation:   All  Speech   Rate / Production: Normal    Volume:  Normal   Mood:    Anxious   Affect:    Appropriate   Thought Content:  Clear   Thought Form:  Coherent  Logical   Insight:    Fair       Safety Issues and Plan for Safety and Risk Management:  Client denies current fears or concerns for personal safety.  Client denies current or recent suicidal ideation or behaviors.  Client denies current or recent homicidal ideation or behaviors.  Client denies current or recent self injurious behavior or ideation.  Client denies other safety concerns.  Recommended that patient call 911 or go to the local ED should there be a change in any of these risk factors.  Client reports there are no firearms in the house.      Diagnostic Criteria:  A. Excessive anxiety and worry about a number of events or activities (such as work or school performance).   B. The person finds it difficult to control the worry.  C. Select 3 or more symptoms (required for diagnosis). Only one item is required in children.   - Restlessness or feeling keyed up or on edge.    - Being easily fatigued.    - Difficulty concentrating or mind going blank.     - Sleep disturbance (difficulty falling or staying asleep, or restless unsatisfying sleep).   E. The anxiety, worry, or physical symptoms cause clinically significant distress or impairment in social, occupational, or other important areas of functioning.   F. The disturbance is not due to the direct physiological effects of a substance (e.g., a drug of abuse, a medication) or a general medical condition (e.g., hyperthyroidism) and does not occur exclusively during a Mood Disorder, a Psychotic Disorder, or a Pervasive Developmental Disorder.      DSM5 Diagnoses: (Sustained by DSM5 Criteria Listed Above)  Diagnoses: 300.02 (F41.1) Generalized Anxiety Disorder  Psychosocial & Contextual Factors: lives in a group home  WHODAS 2.0 (12 item) 14    Collateral Reports Completed:  Routed note to PCP      PLAN: (Homework, other):  Client stated that she may follow up for ongoing services with [facility].  Therapist to complete diagnostic assessment in next session.      Arely Whiteside, MaineGeneral Medical CenterSW 1/16/2020

## 2020-01-17 ASSESSMENT — ANXIETY QUESTIONNAIRES: GAD7 TOTAL SCORE: 10

## 2020-01-18 LAB — C2 SERPL-MCNC: 2.1 MG/DL (ref 1.6–4)

## 2020-01-23 ENCOUNTER — OFFICE VISIT (OUTPATIENT)
Dept: PSYCHOLOGY | Facility: CLINIC | Age: 60
End: 2020-01-23
Payer: MEDICARE

## 2020-01-23 DIAGNOSIS — F41.1 GENERALIZED ANXIETY DISORDER: Primary | ICD-10-CM

## 2020-01-23 PROCEDURE — 90791 PSYCH DIAGNOSTIC EVALUATION: CPT | Performed by: SOCIAL WORKER

## 2020-01-23 NOTE — PROGRESS NOTES
"                                                                                                                                                                        Adult Intake Structured Interview  Standard Diagnostic Assessment      CLIENT'S NAME: Valentine Vu  MRN:   [id number]  :   1960  ACCT. NUMBER: [id number]  DATE OF SERVICE: 2020  VIDEO VISIT: No    Identifying Information:  Client is a 59 year old, , single female. Client was referred for counseling by Dr. Mohamud Valencia, previous individual therapy. Client is currently  employed part time and reports she is able to function appropriately at work. and disabled. Client attended the first half of the session with a worker from her group home,  the second half of the session client met with therapist alone.      Client's Statement of Presenting Concern:  Client reports the reason for seeking therapy at this time as needing to become established with a new therapist.  It was reported that her previous therapist was transitioning to private practice.  Client stated that her symptoms have resulted in the following functional impairments: management of the household and or completion of tasks and relationship(s)      History of Presenting Concern:  Client's worker from her group home reports that these problem(s) have occurred since she moved into their home 1 1/2 years ago. Client has attempted to resolve these concerns in the past through individual therapy, medication . Client reports that other professional(s) are involved in providing support / services. Psychiatry, [facility] , and attends [facility].      Social History:  Client reported she grew up in [place], [place]. They were the second born of five children. This is an intact family and parents remain . Client reported that her childhood was \"good\". Client described her current relationships with family of origin as \"really good\".    Client reported a history " "of no committed relationships or marriages.  Client reported having no children. Client identified some stable and meaningful social connections. Client reported positive relationships with her housemates.  Client reported that she has not been involved with the legal system. Client does have a legal guardian who is her sister, [place]. Client's highest education level was high school graduate. Client did identify the following learning problems: attention, concentration and hearing. There are no ethnic, cultural or Holiness factors that may be relevant for therapy. Client identified her preferred language to be English. Client reported she does not need the assistance of an  or other support involved in therapy. Modifications will not be used to assist communication in therapy. Client did not serve in the .     Client reports family history includes Leukemia in her brother.    Mental Health History:  Client is unaware of family mental health history.  Client previously received the following mental health diagnosis: Anxiety.  Client has received the following mental health services in the past: counseling and psychiatry.  Hospitalizations: None.  Client is currently receiving the following services: psychiatry and county .  Client attends [employer].    Chemical Health History:  Client is unaware of family chemical health history. Client has not received chemical dependency treatment in the past. Client is not currently receiving any chemical dependency treatment. Client reports no problems as a result of their drinking / drug use.    Client Reports:  Client denies using alcohol.  Client denies using tobacco.  Client denies using marijuana.  Client reports using caffeine 1 times per day and drinks 1-2 cups at a time. Patient first started using caffeine \"years\" ago.  Client denies using street drugs.  Client denies the non-medical use of prescription or over the counter " drugs.    CAGE: None of the patient's responses to the CAGE screening were positive / Negative CAGE score   Based on the negative Cage-Aid score and clinical interview there  are not indications of drug or alcohol abuse.    Significant Losses / Trauma / Abuse / Neglect Issues:  There are indications or report of significant loss, trauma, abuse or neglect issues related to: death of brother when client was in her 20's.    Issues of possible neglect are not present.      Medical Issues:  Client has had a physical exam to rule out medical causes for current symptoms. Date of last physical exam was within the past year. Client was encouraged to follow up with PCP if symptoms were to develop. The client has a [facility], who is named Sabrina Dunlap.. The client has a psychiatrist whose name and location are: Dr. Mix in [place]. Client reports no current medical concerns. Client has been having seizure like episodes that she has been seeing medical providers to complete diagnostics.  It was reported that the nature of these episodes continues to be unknown.  The client denies the presence of chronic or episodic pain. There are not significant nutritional concerns.     Patient reports current meds as:   See list in Epic    Client Allergies:  No Known Allergies    Medical History:  Past Medical History:   Diagnosis Date     ADHD      Dementia (H)      Depressive disorder        Medication Adherence:  Client reports taking prescribed medications as prescribed.    Mental Status Assessment:  Appearance:   Appropriate   Eye Contact:   Fair   Psychomotor Behavior: Restless -leg movement  Attitude:   Cooperative   Orientation:   All  Speech   Rate / Production: Normal    Volume:  Normal   Mood:    Anxious   Affect:    Appropriate   Thought Content:  Clear   Thought Form:  Coherent  Logical   Insight:    Fair       Review of Symptoms:  Depression: Sleep Energy Concentration Appetite  Suzy:  No  "symptoms  Psychosis: No symptoms  Anxiety: Worries Nervousness Trouble Relaxing Triggers: change in routine, getting things done, getting along with staff  Panic:  No symptoms  Post Traumatic Stress Disorder: No symptoms  Obsessive Compulsive Disorder: No symptoms  Eating Disorder: No symptoms  Oppositional Defiant Disorder: No symptoms  ADD / ADHD: Attention  Conduct Disorder: No symptoms      Safety Assessment:    History of Safety Concerns:   Client denied a history of suicidal ideation.    Client denied a history of suicide attempts.    Client denied a history of homicidal ideation.    Client denied a history of self-injurious ideation and behaviors.    Client denied a history of personal safety concerns.    Client denied a history of assaultive behaviors.        Current Safety Concerns:  Client denies current suicidal ideation.    Client denies current homicidal ideation and behaviors.  Client denies current self-injurious ideation and behaviors.    Client denies current concerns for personal safety.    Client reports the following protective factors: positive relationships positive social network and positive family connections    Client reports there are no firearms in the house.     Plan for Safety and Risk Management:  Recommended that patient call 911 or go to the local ED should there be a change in any of these risk factors.    Client's Strengths and Limitations:  Client identified the following strengths or resources that will help her succeed in counseling: family support and staff and housemates. Client identified the following supports: family and staff and housemates. Things that may interfere with the client's success in counseling include: \"maybe not taking advice\".      Diagnostic Criteria:  A. Excessive anxiety and worry about a number of events or activities (such as work or school performance).   B. The person finds it difficult to control the worry.  C. Select 3 or more symptoms (required for " diagnosis). Only one item is required in children.   - Restlessness or feeling keyed up or on edge.    - Difficulty concentrating or mind going blank.    - Sleep disturbance (difficulty falling or staying asleep, or restless unsatisfying sleep).   E. The anxiety, worry, or physical symptoms cause clinically significant distress or impairment in social, occupational, or other important areas of functioning.   F. The disturbance is not due to the direct physiological effects of a substance (e.g., a drug of abuse, a medication) or a general medical condition (e.g., hyperthyroidism) and does not occur exclusively during a Mood Disorder, a Psychotic Disorder, or a Pervasive Developmental Disorder.      Functional Status:  Client's symptoms are causing reduced functional status in the following areas: Social / Relational - symptoms can impact relationships  Difficulty completing tasks due to lack of concentration      DSM5 Diagnoses: (Sustained by DSM5 Criteria Listed Above)  Diagnoses: 300.02 (F41.1) Generalized Anxiety Disorder  Psychosocial & Contextual Factors: lives in a group home, has legal guardian  WHODAS 2.0 (12 item) 14    Attendance Agreement:  Client has signed Attendance Agreement:Yes      Collaboration:  Collaboration / coordination with other professionals is not indicated at this time.      Preliminary Treatment Plan:  The client reports no currently identified Sabianist, ethnic or cultural issues relevant to therapy.     services are not indicated.    Modifications to assist communication are not indicated.    The concerns identified by the client will be addressed in therapy.    Initial Treatment will focus on: Anxiety - alleviate symptoms, increase coping strategies.    As a preliminary treatment goal, client will experience a reduction in anxiety and will develop more effective coping skills to manage anxiety symptoms.    The focus of initial interventions will be to alleviate anxiety and  increase coping skills.    Referral to another professional/service is not indicated at this time..    A Release of Information is not needed at this time.    Report to child / adult protection services was NA.    Patient will have open access to their mental health medical record.    YAHAIRA Olson  January 23, 2020

## 2020-01-28 ENCOUNTER — FCC EXTENDED DOCUMENTATION (OUTPATIENT)
Dept: PSYCHOLOGY | Facility: CLINIC | Age: 60
End: 2020-01-28

## 2020-01-28 NOTE — PROGRESS NOTES
Case Consultation Record       Client Name: Valentine Vu   Date:  1/28/2020    Diagnosis: Generalized Anxiety Disorder    Therapist: YAHAIRA Olson      Team Members Present:  YAHAIRA Arias LPCC Jeff Davies, LMFT    Purpose:   Treatment Planning    Recommendations:  Review neurology notes  Work on emotion identification around the episodes

## 2020-02-13 ENCOUNTER — OFFICE VISIT (OUTPATIENT)
Dept: PSYCHOLOGY | Facility: CLINIC | Age: 60
End: 2020-02-13
Payer: MEDICARE

## 2020-02-13 DIAGNOSIS — F41.1 GENERALIZED ANXIETY DISORDER: Primary | ICD-10-CM

## 2020-02-13 PROCEDURE — 90832 PSYTX W PT 30 MINUTES: CPT | Performed by: SOCIAL WORKER

## 2020-02-13 ASSESSMENT — ANXIETY QUESTIONNAIRES
GAD7 TOTAL SCORE: 1
3. WORRYING TOO MUCH ABOUT DIFFERENT THINGS: NOT AT ALL
5. BEING SO RESTLESS THAT IT IS HARD TO SIT STILL: SEVERAL DAYS
2. NOT BEING ABLE TO STOP OR CONTROL WORRYING: NOT AT ALL
4. TROUBLE RELAXING: NOT AT ALL
6. BECOMING EASILY ANNOYED OR IRRITABLE: NOT AT ALL
7. FEELING AFRAID AS IF SOMETHING AWFUL MIGHT HAPPEN: NOT AT ALL
IF YOU CHECKED OFF ANY PROBLEMS ON THIS QUESTIONNAIRE, HOW DIFFICULT HAVE THESE PROBLEMS MADE IT FOR YOU TO DO YOUR WORK, TAKE CARE OF THINGS AT HOME, OR GET ALONG WITH OTHER PEOPLE: SOMEWHAT DIFFICULT
1. FEELING NERVOUS, ANXIOUS, OR ON EDGE: NOT AT ALL

## 2020-02-13 ASSESSMENT — PATIENT HEALTH QUESTIONNAIRE - PHQ9: SUM OF ALL RESPONSES TO PHQ QUESTIONS 1-9: 1

## 2020-02-13 NOTE — PROGRESS NOTES
Progress Note    Patient Name: Valentine Vu  Date: 2/13/2020         Service Type: Individual  Video Visit: No     Session Start Time: 9:01 AM  Session End Time: 9:35 AM     Session Length: 30-37 minutes                  Session #: 3    Attendees: Client attended alone     Treatment Plan Last Reviewed: reviewed in session  PHQ-9 / ORESTES-7 : 2/13/2020    DATA  Interactive Complexity: No  Crisis: No       Progress Since Last Session (Related to Symptoms / Goals / Homework):   Symptoms: Improving decrease PHQ-9 and ORESTES-7 score; client reported improvements as well    Homework: Completed in session      Episode of Care Goals: Minimal progress - PREPARATION (Decided to change - considering how); Intervened by negotiating a change plan and determining options / strategies for behavior change, identifying triggers, exploring social supports, and working towards setting a date to begin behavior change     Current / Ongoing Stressors and Concerns:      Client reported improvements in mood since last session; attributed to staff leaving who was a trigger to anxiety and stress.  She denied current or recent anxiety or experiencing stressors.  She reported having a good review at work and goal to improve concentration.  She reported improvements in sleep.  Client shared events that she is looking forward to participating in.  No seizure-like episodes reported since starting therapy.     Treatment Objective(s) Addressed in This Session:   use relaxation strategies 2 times per day to reduce the physical symptoms of anxiety     Intervention:   Engaged client in progressive muscle relaxation  Engaged client in problem solving  Participated in reflective listening      ASSESSMENT: Current Emotional / Mental Status (status of significant symptoms):   Risk status (Self / Other harm or suicidal ideation)   Patient denies current fears or concerns for personal safety.   Patient denies current or  recent suicidal ideation or behaviors.   Patientdenies current or recent homicidal ideation or behaviors.   Patient denies current or recent self injurious behavior or ideation.   Patient denies other safety concerns.   Patient reports there has been no change in risk factors since their last session.     Patientreports there has been no change in protective factors since their last session.     Recommended that patient call 911 or go to the local ED should there be a change in any of these risk factors.     Appearance:   Appropriate    Eye Contact:   Fair    Psychomotor Behavior: Normal    Attitude:   Cooperative    Orientation:   All   Speech    Rate / Production: Normal     Volume:  Normal    Mood:    Anxious    Affect:    Appropriate    Thought Content:  Clear    Thought Form:  Coherent  Logical    Insight:    Fair      Medication Review:   No changes to current psychiatric medication(s)     Medication Compliance:   Yes     Changes in Health Issues:   None reported     Chemical Use Review:   Substance Use: Chemical use reviewed, no active concerns identified      Tobacco Use: No current tobacco use.      Diagnosis:  Generalized Anxiety Disorder    Collateral Reports Completed:   Not Applicable    PLAN: (Patient Tasks / Therapist Tasks / Other)  Encouraged client to practice progressive muscle relaxation daily.  Provided client and staff with script.  Encouraged client to track mood three times a day and provided mood log. Reviewed homework with staff and client.  Client continues to engage in journaling    YAHAIRA Olson 2/13/2020                                                  ______________________________________________________________________    Treatment Plan    Patient's Name: Valentine Vu  YOB: 1960    Date: 2/13/2020    DSM5 Diagnoses: 300.02 (F41.1) Generalized Anxiety Disorder  Psychosocial / Contextual Factors: lives in a group home  WHODAS: 14    Referral /  Collaboration:  Referral to another professional/service is not indicated at this time..    Anticipated number of session or this episode of care: 12      MeasurableTreatment Goal(s) related to diagnosis / functional impairment(s)  Goal 1: Patient's anxiety will remit as evidenced by a decrease in GAD7 score by at least 6 points or below a 5, where symptoms occur fewer than half the days for a minimum of 4 weeks.  Client's Goal:  I will know I've met my goal when I am able to relax.      Objective #A (Patient Action)    Patient will use at least 3 coping skills for anxiety management in the next 8 weeks.  Status: New - Date: 2/13/2020     Intervention(s)  Therapist will teach coping strategies such as grounding techniques and cognitive restructuring to manage anxiety symptoms.    Objective #B  Patient will identify at least 3 triggers for anxiety.  Status: New - Date: 2/13/2020     Intervention(s)  Therapist will engage client in discussion to identify cognitions and situations that precede anxiety symptoms.    Objective #C  Patient will use relaxation strategies 1 times per day to reduce the physical symptoms of anxiety.  Status: New - Date: 2/13/2020     Intervention(s)  Therapist will teach relaxation strategies and address any barriers in engaging in the strategies..      Patient has reviewed and agreed to the above plan.      YAHAIRA Olson  February 13, 2020

## 2020-02-14 ASSESSMENT — ANXIETY QUESTIONNAIRES: GAD7 TOTAL SCORE: 1

## 2020-02-27 ENCOUNTER — HOSPITAL ENCOUNTER (OUTPATIENT)
Dept: MAMMOGRAPHY | Facility: CLINIC | Age: 60
Discharge: HOME | End: 2020-02-27
Attending: INTERNAL MEDICINE | Admitting: INTERNAL MEDICINE
Payer: MEDICARE

## 2020-02-27 DIAGNOSIS — Z12.31 VISIT FOR SCREENING MAMMOGRAM: ICD-10-CM

## 2020-02-27 PROCEDURE — 77063 BREAST TOMOSYNTHESIS BI: CPT

## 2020-03-17 ENCOUNTER — TELEPHONE (OUTPATIENT)
Dept: PSYCHOLOGY | Facility: CLINIC | Age: 60
End: 2020-03-17

## 2020-03-17 NOTE — TELEPHONE ENCOUNTER
Therapist called to notify client and her group home  that session will take place by phone due to COVID19; they voiced understanding and agreement.

## 2020-03-19 ENCOUNTER — VIRTUAL VISIT (OUTPATIENT)
Dept: PSYCHOLOGY | Facility: CLINIC | Age: 60
End: 2020-03-19
Payer: MEDICARE

## 2020-03-19 DIAGNOSIS — F41.1 GENERALIZED ANXIETY DISORDER: Primary | ICD-10-CM

## 2020-03-19 PROCEDURE — 98967 PH1 ASSMT&MGMT NQHP 11-20: CPT | Performed by: SOCIAL WORKER

## 2020-03-19 NOTE — PROGRESS NOTES
"Valentine Vu is a 59 year old female who is being evaluated via a billable telephone visit.      The patient has been notified of following:     \"This telephone visit will be conducted via a call between you and your physician/provider. This service lets us provide the care you need with a short phone conversation.\"     Valentine Vu denied concerns related to mood.  She reported she is doing \"really good\".  She reported she is off work until April 6th.  She reported she has been staying busy with exercising and crafts.  She reported that tracking her moods have been helpful.  She denied having any seizure-like episodes.  Briefly talked with staff who denied concerns related to her mental health.    I have reviewed and updated the patient's Past Medical History, Social History, Family History and Medication List.    ALLERGIES  Patient has no known allergies.    Interventions:  Reinforced current behaviors related to engagement in physical health and areas of interest  Engaged her in discussing moods she has identified in her tracking    Assessment/Plan:  Generalized Anxiety Disorder      Patient denies current fears or concerns for personal safety.              Patient denies current or recent suicidal ideation or behaviors.              Patientdenies current or recent homicidal ideation or behaviors.              Patient denies current or recent self injurious behavior or ideation.              Patient denies other safety concerns.    Client's staff reported she would call back to schedule next appointment.  Encouraged client to continue tracking her mood and engaging in areas of interest.    Phone call duration: 11 minutes  Start Time: 8:07 AM  End time: 8:18 AM    YAHAIRA Olson 3/19/2020                "

## 2020-04-20 ENCOUNTER — VIRTUAL VISIT (OUTPATIENT)
Dept: PSYCHOLOGY | Facility: CLINIC | Age: 60
End: 2020-04-20
Payer: MEDICARE

## 2020-04-20 DIAGNOSIS — F41.1 GENERALIZED ANXIETY DISORDER: Primary | ICD-10-CM

## 2020-04-20 PROCEDURE — 90832 PSYTX W PT 30 MINUTES: CPT | Mod: 95 | Performed by: SOCIAL WORKER

## 2020-04-20 ASSESSMENT — PATIENT HEALTH QUESTIONNAIRE - PHQ9: SUM OF ALL RESPONSES TO PHQ QUESTIONS 1-9: 4

## 2020-04-20 ASSESSMENT — ANXIETY QUESTIONNAIRES
6. BECOMING EASILY ANNOYED OR IRRITABLE: NOT AT ALL
GAD7 TOTAL SCORE: 0
5. BEING SO RESTLESS THAT IT IS HARD TO SIT STILL: NOT AT ALL
4. TROUBLE RELAXING: NOT AT ALL
1. FEELING NERVOUS, ANXIOUS, OR ON EDGE: NOT AT ALL
3. WORRYING TOO MUCH ABOUT DIFFERENT THINGS: NOT AT ALL
2. NOT BEING ABLE TO STOP OR CONTROL WORRYING: NOT AT ALL
7. FEELING AFRAID AS IF SOMETHING AWFUL MIGHT HAPPEN: NOT AT ALL

## 2020-04-20 NOTE — PROGRESS NOTES
"                                           Progress Note    Patient Name: Valentine Vu  Date: 4/20/2020         Service Type: Phone Visit      Session Start Time: 9:35 AM  Session End Time: 10:05 AM     Session Length: 30-37 minutes    Session #: 5    Attendees: Client attended alone first half of session, client's  attended second half alone    Telemedicine Visit: The patient has been notified of the following:      \"We have found that certain health care needs can be provided without the need for a face to face visit.  This service lets us provide the care you need with a phone conversation.       I will have full access to your [facility] medical record during this entire phone call.   I will be taking notes for your medical record.      Since this is like an office visit, we will bill your insurance company for this service.       There are potential benefits and risks of telephone visits (e.g. limits to patient confidentiality) that differ from in-person visits.?  Confidentiality still applies for telephone services, and nobody will record the visit.  It is important to be in a quiet, private space that is free of distractions (including cell phone or other devices) during the visit.??      If during the course of the call I believe a telephone visit is not appropriate, you will not be charged for this service\"     Consent has been obtained for this service by care team member: Yes     Attempted to connect by video; client was restricted from being able to download cesario.   to check on restrictions with client's legal guardian for future visits    Reason for Telemedicine Visit: Services only offered telehealth    Originating Site (Patient Location): Patient's home    Distant Site (Provider Location): Provider Remote Setting    Consent:  The patient/guardian has verbally consented to: the potential risks and benefits of telemedicine (video visit) versus in person care; bill my insurance or " make self-payment for services provided; and responsibility for payment of non-covered services.      Treatment Plan Last Reviewed: 2/13/2020  PHQ-9 / ORESTES-7 :  4/20/2020    DATA  Interactive Complexity: No  Crisis: No       Progress Since Last Session (Related to Symptoms / Goals / Homework):   Symptoms: Worsening anxiety per     Homework: Did not complete       Episode of Care Goals: Minimal progress - ACTION (Actively working towards change); Intervened by reinforcing change plan / affirming steps taken     Current / Ongoing Stressors and Concerns:   Not currently working     Client reported her biggest concern is when she will return to work.  She identified experiencing increased boredom with absence of work.  She reported staying busy by daily walks and use of elliptical and engaging in crafts.  She denied current stressors or anxiety symptoms or seizure-like episodes.  She denied having anything to discuss with therapist.  Therapist checked in with  about reported absence of symptoms.   reported observing minimal increase in irritability and more anxiety.  She explained that client will frequently check-in about her date to return to work.  She reported that client had a seizure-like episode in March.     Treatment Objective(s) Addressed in This Session:   use at least 2 coping skills for anxiety management in the next 2 weeks     Intervention:   explored recent symptoms and triggers    Reinforced behavior changes   Motivational interviewing: use of open-ended questions and reflective listening   Offered coaching to  on assisting client with identifying anxiety and managing symptoms     ASSESSMENT: Current Emotional / Mental Status (status of significant symptoms):   Risk status (Self / Other harm or suicidal ideation)   Patient denies current fears or concerns for personal safety.   Patient denies current or recent suicidal ideation or behaviors.   Patient  denies current or recent homicidal ideation or behaviors.   Patient denies current or recent self injurious behavior or ideation.   Patient denies other safety concerns.   Patient reports there has been no change in risk factors since their last session.     Patient reports there has been no change in protective factors since their last session.     Recommended that patient call 911 or go to the local ED should there be a change in any of these risk factors.     Appearance:   unable to assess    Eye Contact:   unable to assess    Psychomotor Behavior: unable to assess    Attitude:   Cooperative  Pleasant   Orientation:   All   Speech    Rate / Production: Normal     Volume:  Normal    Mood:    Normal   Affect:    unable to assess    Thought Content:  Clear    Thought Form:  Coherent    Insight:    Poor      Medication Review:   No changes to current psychiatric medication(s)     Medication Compliance:   Yes     Changes in Health Issues:   None reported     Chemical Use Review:   Substance Use: Chemical use reviewed, no active concerns identified      Tobacco Use: No current tobacco use.      Diagnosis:  Generalized Anxiety Disorder    Collateral Reports Completed:   Not Applicable    PLAN: (Patient Tasks / Therapist Tasks / Other)  Client reported she plans to continue to exercise and engage in crafts.  Encouraged  to help client increase awareness of worry by acknowledging it and encourage client to cope instead of continuing to offer reassurance.     Arely Whiteside, [facility] 4/20/2020                                               ______________________________________________________________________    Treatment Plan     Patient's Name: Valentine Vu                        YOB: 1960     Date: 2/13/2020     DSM5 Diagnoses: 300.02 (F41.1) Generalized Anxiety Disorder  Psychosocial / Contextual Factors: lives in a group home  WHODAS: 14     Referral / Collaboration:  Referral to another  professional/service is not indicated at this time..     Anticipated number of session or this episode of care: 12        MeasurableTreatment Goal(s) related to diagnosis / functional impairment(s)  Goal 1: Patient's anxiety will remit as evidenced by a decrease in GAD7 score by at least 6 points or below a 5, where symptoms occur fewer than half the days for a minimum of 4 weeks.  Client's Goal:  I will know I've met my goal when I am able to relax.       Objective #A (Patient Action)                          Patient will use at least 3 coping skills for anxiety management in the next 8 weeks.  Status: New - Date: 2/13/2020      Intervention(s)  Therapist will teach coping strategies such as grounding techniques and cognitive restructuring to manage anxiety symptoms.     Objective #B  Patient will identify at least 3 triggers for anxiety.  Status: New - Date: 2/13/2020      Intervention(s)  Therapist will engage client in discussion to identify cognitions and situations that precede anxiety symptoms.     Objective #C  Patient will use relaxation strategies 1 times per day to reduce the physical symptoms of anxiety.  Status: New - Date: 2/13/2020      Intervention(s)  Therapist will teach relaxation strategies and address any barriers in engaging in the strategies..        Patient has reviewed and agreed to the above plan.        Arely Whiteside, [facility]                  February 13, 2020

## 2020-04-21 ASSESSMENT — ANXIETY QUESTIONNAIRES: GAD7 TOTAL SCORE: 0

## 2020-04-30 ENCOUNTER — VIRTUAL VISIT (OUTPATIENT)
Dept: INTERNAL MEDICINE | Facility: CLINIC | Age: 60
End: 2020-04-30
Payer: MEDICARE

## 2020-04-30 DIAGNOSIS — R04.0 BLEEDING NOSE: ICD-10-CM

## 2020-04-30 DIAGNOSIS — S00.81XA ABRASION OF CHIN, INITIAL ENCOUNTER: ICD-10-CM
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
## 2020-04-30 PROCEDURE — 99213 OFFICE O/P EST LOW 20 MIN: CPT | Mod: 95 | Performed by: INTERNAL MEDICINE

## 2020-04-30 RX ORDER — LEVETIRACETAM 250 MG/1
250 TABLET ORAL 2 TIMES DAILY
COMMUNITY
End: 2021-02-26

## 2020-04-30 ASSESSMENT — ENCOUNTER SYMPTOMS
HEADACHES: 0
DIZZINESS: 0

## 2020-04-30 NOTE — PATIENT INSTRUCTIONS
Patient Education     Nosebleed  The skin inside your nose is fragile and filled with blood vessels. That's why even a slight injury to your nose sometimes may cause bleeding. Hard nose blowing, dry winter air, colds, and nose-picking can also cause nosebleeds. Medicines such as warfarin, aspirin, and other blood thinners can make it more likely to have a nosebleed that is difficult to stop. Normally, nosebleeds aren't a cause for concern. But in some cases, they can mean that you have a more serious health problem. Know when to seek medical care for a nosebleed.  When to go to the emergency room (ER)  Most nosebleeds aren t a medical emergency. In fact, you often can treat them yourself. But see your healthcare provider if you have nosebleeds often. And seek care right away if you:    Have a head injury    Have bleeding that lasts more than 15 to 30 minutes or is severe    Feel weak or faint    Have trouble breathing  What to expect in the ER    You will be examined and may have blood tests.    You may be given medicated nose drops to stop the nosebleed.    The doctor may pack gauze into your nose to put pressure on the vessel and help stop bleeding.    The bleeding vessel may be cauterized. During this procedure, the vessel is burned with an electrical device or chemical. Your nose is first numbed so you won t feel any pain.    In rare cases, you may need surgery to control the bleeding.  Home care for a nosebleed    Don't blow your nose for 12 hours after the bleeding stops. This will allow a strong blood clot to form. Don't pick your nose. This may restart bleeding.    Don't drink alcohol or hot liquids for the next 2 days. Alcohol and hot liquids can dilate blood vessels in your nose. This can cause bleeding to start again.    Don't take ibuprofen, naproxen, or medicines that contain aspirin. These thin the blood and may cause your nose to bleed. You may take acetaminophen for pain, unless another pain medicine  was prescribed.    If the bleeding starts again, sit up and lean forward to prevent swallowing blood. Pinch your nose tightly on both sides for 10 to 15 minutes. Time yourself. Don t release the pressure on your nose until 10 minutes is up. If bleeding doesn't stop, continue to pinch your nose. Call your healthcare provider.    If you have a cold, allergies, or dry nasal membranes, lubricate the nasal passages. Apply a small amount of petroleum jelly inside the nose with a cotton swab twice a day (morning and night).    Don't overheat your home. This can dry the air and make your condition worse.    Put a humidifier in the room where you sleep. This will add moisture to the air.    Use a saline nasal spray to keep nasal passages moist.    Don't pick your nose. Keep fingernails trimmed to decrease risk of bleeds.    Don't smoke.    Follow all other home care instructions from your healthcare provider.    Call your healthcare provider if you have any questions or concerns.  Date Last Reviewed: 10/1/2016    7921-9613 [employer]. [address] [place] [address], [place] [address]. All rights reserved. This information is not intended as a substitute for professional medical care. Always follow your healthcare professional's instructions.

## 2020-04-30 NOTE — PROGRESS NOTES
"Valentine Vu is a 59 year old female who is being evaluated via a billable video visit.      The patient has been notified of following:     \"This video visit will be conducted via a call between you and your physician/provider. We have found that certain health care needs can be provided without the need for an in-person physical exam.  This service lets us provide the care you need with a video conversation.  If a prescription is necessary we can send it directly to your pharmacy.  If lab work is needed we can place an order for that and you can then stop by our lab to have the test done at a later time.    Video visits are billed at different rates depending on your insurance coverage.  Please reach out to your insurance provider with any questions.    If during the course of the call the physician/provider feels a video visit is not appropriate, you will not be charged for this service.\"    Patient has given verbal consent for Video visit? Yes    How would you like to obtain your AVS? E-Mail (inform patient AVS not encrypted)    Patient would like the video invitation sent by: Text to cell phone: [phone]    Will anyone else be joining your video visit?  for group home     Subjective     Valentine Vu is a 59 year old female who presents to clinic today for the following health issues:    HPI     Video Start Time: 2:00 PM facilitated by staff Jocelyn.    Ptjeanine lives in group home and kevyn for a walk with staff around 11 am. Road was uneven and lost balance and fell down. Denies any CP, shortness of breath, palpitations. No LOC.  Landed on nose and chin. Nose bled for short period of time.  Currently denies any discomfort or any new symptoms.  BP check by staff was 105/61mmHg, MO 62/min.      Patient Active Problem List   Diagnosis     Major depression in complete remission (H)     Seasonal allergic rhinitis     Mild dementia (H)     Benign essential hypertension     Syncope     Past Surgical " History:   Procedure Laterality Date     COLONOSCOPY N/A 5/28/2019    Procedure: COLONOSCOPY (crsal);  Surgeon: Maddy Morrow MD;  Location:  GI     ORTHOPEDIC SURGERY      Knee replacements       Social History     Tobacco Use     Smoking status: Never Smoker     Smokeless tobacco: Never Used   Substance Use Topics     Alcohol use: No     Family History   Problem Relation Age of Onset     Leukemia Brother      Unknown/Adopted No family hx of          Current Outpatient Medications   Medication Sig Dispense Refill     carbamide peroxide (DEBROX) 6.5 % otic solution Use twice daily for 4 days prior to appt for ear cleaning. 15 mL 4     cetirizine (ZYRTEC) 10 MG tablet Take 1 tablet (10 mg) by mouth daily 90 tablet 4     Cholecalciferol (VITAMIN D) 2000 units tablet Take 2,000 Units by mouth daily 100 tablet 4     citalopram (CELEXA) 40 MG tablet Take 1 tablet (40 mg) by mouth daily 90 tablet 1     cycloSPORINE modified (GENERIC EQUIVALENT) 100 MG capsule Take 100 mg by mouth 2 times daily       donepezil (ARICEPT) 10 MG tablet Take 1 tablet (10 mg) by mouth At Bedtime 90 tablet 4     ferrous sulfate (IRON) 325 (65 Fe) MG tablet Take 1 tablet (325 mg) by mouth daily (with breakfast) 90 tablet 4     ibuprofen (ADVIL/MOTRIN) 200 MG tablet Take 1-2 tablets (200-400 mg) by mouth every 4 hours as needed for mild pain 30 tablet 0     ketotifen (ZADITOR/REFRESH ANTI-ITCH) 0.025 % ophthalmic solution 1 drop 2 times daily       levETIRAcetam (KEPPRA) 250 MG tablet Take 250 mg by mouth 2 times daily       Melatonin 10 MG TABS tablet Take 10 mg by mouth nightly as needed for sleep        tetrahydrozoline (VISINE) 0.05 % ophthalmic solution Place 1 drop into both eyes 3 times daily as needed (itchy eyes)       multivitamin, therapeutic with minerals (MULTI-VITAMIN) TABS tablet Take 1 tablet by mouth daily 100 tablet 4     psyllium (METAMUCIL/KONSYL) 58.6 % powder Take 1 Tablespoonful by mouth daily as needed for  "constipation       No Known Allergies    Reviewed and updated as needed this visit by Provider       Review of Systems   HENT: Positive for nosebleeds.    Eyes: Negative for visual disturbance.   Neurological: Negative for dizziness, syncope and headaches.        Objective    LMP  (LMP Unknown)   Estimated body mass index is 26.15 kg/m  as calculated from the following:    Height as of 1/8/20: 5' 0.25\" (1.53 m).    Weight as of 1/8/20: 135 lb (61.2 kg).  Physical Exam     \"GENERAL: healthy, alert and no distress\",\"EYES: Eyes grossly normal to inspection, conjunctivae and sclerae normal\",\"RESP: no audible wheeze, cough, or visible cyanosis.  No visible retractions or increased work of breathing.  Able to speak fully in complete sentences.\",\"NEURO: Cranial nerves grossly intact, mentation intact and speech normal\",\"PSYCH: mentation appears normal, affect normal/bright normal speech and appearance well-groomed\"  HEENT: Superficial scarping noted in chin, nose tip. No tenderness noted in nasal bone when patient touched, also no tenderness in chin, maxillary region. Pea size bump noted in right supraorbital ridge.  Able to move head to right and left side with no discomfort in neck region.    Assessment & Plan     Valentine was seen today for fall.    Diagnoses and all orders for this visit:    Fall due to stumbling, initial encounter    Bleeding nose    Abrasion of chin, initial encounter    Advised to monitor.  Nose bleeding is resolved. Continue ice packs and neuro checks.  Call if change in mental status or for any new symptoms.    Kyle Hendrix MD  [facility]    Video-Visit Details    Type of service:  Video Visit    Video End Time:2:08 PM    Originating Location (pt. Location):Group home    Distant Location (provider location): HOME    Platform used for Video Visit: Bernard Hendrix MD      "

## 2020-05-06 ENCOUNTER — VIRTUAL VISIT (OUTPATIENT)
Dept: PSYCHOLOGY | Facility: CLINIC | Age: 60
End: 2020-05-06
Payer: MEDICARE

## 2020-05-06 DIAGNOSIS — F41.1 GENERALIZED ANXIETY DISORDER: Primary | ICD-10-CM

## 2020-05-06 PROCEDURE — 90832 PSYTX W PT 30 MINUTES: CPT | Mod: 95 | Performed by: SOCIAL WORKER

## 2020-05-06 NOTE — PROGRESS NOTES
Progress Note    Patient Name: Valentine Vu  Date: 5/6/2020         Service Type: Video Visit      Session Start Time: 9:32 AM  Session End Time: 10:07 AM     Session Length: 30-37 minutes    Session #: 6    Attendees: spoke with  at start of session and met alone with client for remainder of session     Telemedicine Visit: The patient's condition can be safely assessed and treated via synchronous audio and visual telemedicine encounter.      Reason for Telemedicine Visit: Services only offered telehealth    Originating Site (Patient Location): Patient's home    Distant Site (Provider Location): Provider Remote Setting    Consent:  The patient/guardian has verbally consented to: the potential risks and benefits of telemedicine (video visit) versus in person care; bill my insurance or make self-payment for services provided; and responsibility for payment of non-covered services.      Treatment Plan Last Reviewed: 2/13/2020  PHQ-9 / ORESTES-7 :  4/20/2020    DATA  Interactive Complexity: No  Crisis: No       Progress Since Last Session (Related to Symptoms / Goals / Homework):   Symptoms: Improving anxiety per client and     Homework: Achieved / completed to satisfaction       Episode of Care Goals: Satisfactory progress - ACTION (Actively working towards change); Intervened by reinforcing change plan / affirming steps taken     Current / Ongoing Stressors and Concerns:   Not currently working     Client's  reported observing improvements with anxiety.  Client and  agreed main concern is client's experiencing boredom.  Client reported minimal worry about when she will return to work.  She reported that she is using distraction and focusing on the present moment to cope.  She reported she has been experiencing less worry about work.  She reported she has been reading before bed which has been helpful with sleep.  She denied any  issues with sleep.     Treatment Objective(s) Addressed in This Session:   use at least 2 coping skills for anxiety management in the next 2 weeks     Intervention:   explored recent symptoms and triggers    Reinforced behavior changes   Motivational interviewing: use of open-ended questions and reflective listening      ASSESSMENT: Current Emotional / Mental Status (status of significant symptoms):   Risk status (Self / Other harm or suicidal ideation)   Patient denies current fears or concerns for personal safety.   Patient denies current or recent suicidal ideation or behaviors.   Patient denies current or recent homicidal ideation or behaviors.   Patient denies current or recent self injurious behavior or ideation.   Patient denies other safety concerns.   Patient reports there has been no change in risk factors since their last session.     Patient reports there has been no change in protective factors since their last session.     Recommended that patient call 911 or go to the local ED should there be a change in any of these risk factors.     Appearance:   Appropriate    Eye Contact:   Good    Psychomotor Behavior: Normal    Attitude:   Cooperative  Pleasant   Orientation:   All   Speech    Rate / Production: Normal     Volume:  Normal    Mood:    Normal   Affect:    Appropriate    Thought Content:  Clear    Thought Form:  Coherent    Insight:    Fair      Medication Review:   No changes to current psychiatric medication(s)     Medication Compliance:   Yes     Changes in Health Issues:   None reported     Chemical Use Review:   Substance Use: Chemical use reviewed, no active concerns identified      Tobacco Use: No current tobacco use.      Diagnosis:  Generalized Anxiety Disorder    Collateral Reports Completed:   Not Applicable    PLAN: (Patient Tasks / Therapist Tasks / Other)  Client to continue to focus on staying in the present moment, use distraction, and engage in areas of interest.    Arely  Miesha, [facility] 5/6/2020                                               ______________________________________________________________________    Treatment Plan     Patient's Name: Valentine Vu                        YOB: 1960     Date: 2/13/2020     DSM5 Diagnoses: 300.02 (F41.1) Generalized Anxiety Disorder  Psychosocial / Contextual Factors: lives in a group home  WHODAS: 14     Referral / Collaboration:  Referral to another professional/service is not indicated at this time..     Anticipated number of session or this episode of care: 12        MeasurableTreatment Goal(s) related to diagnosis / functional impairment(s)  Goal 1: Patient's anxiety will remit as evidenced by a decrease in GAD7 score by at least 6 points or below a 5, where symptoms occur fewer than half the days for a minimum of 4 weeks.  Client's Goal:  I will know I've met my goal when I am able to relax.       Objective #A (Patient Action)                          Patient will use at least 3 coping skills for anxiety management in the next 8 weeks.  Status: New - Date: 2/13/2020      Intervention(s)  Therapist will teach coping strategies such as grounding techniques and cognitive restructuring to manage anxiety symptoms.     Objective #B  Patient will identify at least 3 triggers for anxiety.  Status: New - Date: 2/13/2020      Intervention(s)  Therapist will engage client in discussion to identify cognitions and situations that precede anxiety symptoms.     Objective #C  Patient will use relaxation strategies 1 times per day to reduce the physical symptoms of anxiety.  Status: New - Date: 2/13/2020      Intervention(s)  Therapist will teach relaxation strategies and address any barriers in engaging in the strategies..        Patient has reviewed and agreed to the above plan.        Arely Whiteside, [facility]                  February 13, 2020

## 2020-06-03 ENCOUNTER — VIRTUAL VISIT (OUTPATIENT)
Dept: PSYCHOLOGY | Facility: CLINIC | Age: 60
End: 2020-06-03
Payer: MEDICARE

## 2020-06-03 DIAGNOSIS — F41.1 GENERALIZED ANXIETY DISORDER: Primary | ICD-10-CM

## 2020-06-03 PROCEDURE — 90832 PSYTX W PT 30 MINUTES: CPT | Mod: 95 | Performed by: SOCIAL WORKER

## 2020-06-03 ASSESSMENT — PATIENT HEALTH QUESTIONNAIRE - PHQ9: SUM OF ALL RESPONSES TO PHQ QUESTIONS 1-9: 1

## 2020-06-03 ASSESSMENT — ANXIETY QUESTIONNAIRES
7. FEELING AFRAID AS IF SOMETHING AWFUL MIGHT HAPPEN: NOT AT ALL
4. TROUBLE RELAXING: NOT AT ALL
GAD7 TOTAL SCORE: 0
6. BECOMING EASILY ANNOYED OR IRRITABLE: NOT AT ALL
5. BEING SO RESTLESS THAT IT IS HARD TO SIT STILL: NOT AT ALL
3. WORRYING TOO MUCH ABOUT DIFFERENT THINGS: NOT AT ALL
2. NOT BEING ABLE TO STOP OR CONTROL WORRYING: NOT AT ALL
1. FEELING NERVOUS, ANXIOUS, OR ON EDGE: NOT AT ALL

## 2020-06-03 NOTE — PROGRESS NOTES
Progress Note    Patient Name: Valentine Vu  Date: 6/3/2020         Service Type: Video Visit      Session Start Time:  9:31 AM Session End Time: 10:05 AM     Session Length: 16-37 minutes     Session #: 7    Attendees: spoke with  at start of session and met alone with client for remainder of session     Telemedicine Visit: The patient's condition can be safely assessed and treated via synchronous audio and visual telemedicine encounter.      Reason for Telemedicine Visit: Services only offered telehealth    Originating Site (Patient Location): Patient's home    Distant Site (Provider Location): Provider Remote Setting    Consent:  The patient/guardian has verbally consented to: the potential risks and benefits of telemedicine (video visit) versus in person care; bill my insurance or make self-payment for services provided; and responsibility for payment of non-covered services.     Mode of Communication: Video/AmWell     Treatment Plan Last Reviewed:  6/3/2020  PHQ-9 / ORESTES-7 :  6/3/2020    DATA  Interactive Complexity: No  Crisis: No       Progress Since Last Session (Related to Symptoms / Goals / Homework):   Symptoms: stable-minimal symptoms reported     Homework: Achieved / completed to satisfaction      Episode of Care Goals: Satisfactory progress - ACTION (Actively working towards change); Intervened by reinforcing change plan / affirming steps taken     Current / Ongoing Stressors and Concerns:   Not currently working     Client's  indicated that she has had to talk with client about boundaries with staff due to texting one of them when they were off.  No additional concerns reported.  Client denied symptoms of anxiety.  She reported that when she begins to experience worry she will engage in distraction.  Primary worry is when she will return to work though she reported she has been more accepting of the fact that when she will return is  unknown.  She reported continued efforts to stay busy.  She denied any concerns to discuss on this date with therapist.     Treatment Objective(s) Addressed in This Session:   use at least 2 coping skills for anxiety management in the next 2 weeks     Intervention:   explored recent symptoms and triggers    Reinforced behavior changes   Motivational interviewing: use of open-ended questions and reflective listening      ASSESSMENT: Current Emotional / Mental Status (status of significant symptoms):   Risk status (Self / Other harm or suicidal ideation)   Patient denies current fears or concerns for personal safety.   Patient denies current or recent suicidal ideation or behaviors.   Patient denies current or recent homicidal ideation or behaviors.   Patient denies current or recent self injurious behavior or ideation.   Patient denies other safety concerns.   Patient reports there has been no change in risk factors since their last session.     Patient reports there has been no change in protective factors since their last session.     Recommended that patient call 911 or go to the local ED should there be a change in any of these risk factors.     Appearance:   Appropriate    Eye Contact:   Good    Psychomotor Behavior: Normal    Attitude:   Cooperative  Pleasant   Orientation:   All   Speech    Rate / Production: Normal     Volume:  Normal    Mood:    Normal   Affect:    Appropriate    Thought Content:  Clear    Thought Form:  Coherent    Insight:    Fair      Medication Review:   No changes to current psychiatric medication(s)     Medication Compliance:   Yes     Changes in Health Issues:   None reported     Chemical Use Review:   Substance Use: Chemical use reviewed, no active concerns identified      Tobacco Use: No current tobacco use.      Diagnosis:  Generalized Anxiety Disorder    Collateral Reports Completed:   Not Applicable    PLAN: (Patient Tasks / Therapist Tasks / Other)  Client to continue to utilize  coping strategies to manage anxiety, including engaging in distraction.    Arely Whiteside, [facility]  6/3/2020                                               ______________________________________________________________________    Treatment Plan     Patient's Name: Valentine Vu                        YOB: 1960     Date: 6/3/2020     DSM5 Diagnoses: 300.02 (F41.1) Generalized Anxiety Disorder  Psychosocial / Contextual Factors: lives in a group home  WHODAS: 12     Referral / Collaboration:  Referral to another professional/service is not indicated at this time..     Anticipated number of session or this episode of care: 12        MeasurableTreatment Goal(s) related to diagnosis / functional impairment(s)  Goal 1: Patient's anxiety will remit as evidenced by a decrease in GAD7 score by at least 6 points or below a 5, where symptoms occur fewer than half the days for a minimum of 4 weeks.  Client's Goal:  I will know I've met my goal when I am able to relax.       Objective #A (Patient Action)                          Patient will use at least 3 coping skills for anxiety management in the next 8 weeks.  Status: Continued - Date: 6/3/2020      Intervention(s)  Therapist will teach coping strategies such as grounding techniques and cognitive restructuring to manage anxiety symptoms.     Objective #B  Patient will identify at least 3 triggers for anxiety.  Status: Continued - Date: 6/3/2020       Intervention(s)  Therapist will engage client in discussion to identify cognitions and situations that precede anxiety symptoms.     Objective #C  Patient will use relaxation strategies 1 times per day to reduce the physical symptoms of anxiety.  Status: Continued - Date: 6/3/2020       Intervention(s)  Therapist will teach relaxation strategies and address any barriers in engaging in the strategies..        Patient has reviewed and agreed to the above plan.        Arely Whiteside [facility]                  6/3/2020

## 2020-06-04 ASSESSMENT — ANXIETY QUESTIONNAIRES: GAD7 TOTAL SCORE: 0

## 2020-06-15 ENCOUNTER — VIRTUAL VISIT (OUTPATIENT)
Dept: NEUROLOGY | Facility: CLINIC | Age: 60
End: 2020-06-15
Payer: MEDICARE

## 2020-06-15 DIAGNOSIS — R40.4 EPISODIC ALTERED AWARENESS: Primary | ICD-10-CM

## 2020-06-15 NOTE — PROGRESS NOTES
"Valentine Vu is a 59 year old female who is being evaluated via a billable video visit.      The patient has been notified of following:     \"This video visit will be conducted via a call between you and your physician/provider. We have found that certain health care needs can be provided without the need for an in-person physical exam.  This service lets us provide the care you need with a video conversation.  If a prescription is necessary we can send it directly to your pharmacy.  If lab work is needed we can place an order for that and you can then stop by our lab to have the test done at a later time.    Video visits are billed at different rates depending on your insurance coverage.  Please reach out to your insurance provider with any questions.    If during the course of the call the physician/provider feels a video visit is not appropriate, you will not be charged for this service.\"    Patient has given verbal consent for Video visit? YES    Will anyone else be joining your video visit? no        Video-Visit Details    Type of service:  Video Visit    Video Start Time: 10 am  Video End Time: 10:25    Originating Location (pt. Location): Home    Distant Location (provider location):  [facility] NEUROLOGY     Platform used for Video Visit: RENATE Sena                "

## 2020-06-15 NOTE — PROGRESS NOTES
Service Date: 06/15/2020      This was a virtual video visit.x 31 min        Jesusita 15, 2020      Sabrina Dunlap MD    [facility]    [address] [place]  [address]       RE: Valentine Vu   MRN: [id number]   : 1960      Dear Dr. Dunlap:       We had the privilege of doing a followup video visit on MsAsher Vu, a 59-year-old woman resident of a group home.  Her group home staff was present, namely Jocelyn and another person.  This was a good video visit where we could see her and do some testing.  Valentine is a very pleasant woman, and she has had episodes in the past questionable for seizures, although she had ambulatory EEG and these were captured and found to have no correlates.  The group home staff called and sent her for a recheck as she has had a couple of these episodes in .  The last one was on  and also subsequently.  They were pretty much the same where she was walking and it was a hot day and she seemed to slow down and shuffle a little bit, so they sat her down.  She did not have any type of abnormal movement and activities, staring or significantly unresponsive, and it was gone in a few minutes or seconds.  There was a similar one after that.  I asked the patient about this through the video and she pleasantly responded that she has been anxious and bored and when she had these episodes there has been anxiety.  She is seen by Psychology and has a diagnosis of generalized anxiety disorder.  At any point, no convulsive activity or anything else was noted.  She has had a full workup before including, as I said, ambulatory EEG done 48 hours where these were captured and had no correlates.  She has had imaging of her brain done and that was in  when some of these things started to happen, and it was a normal MRI of the brain with and without contrast.      She at that point had some slurred speech.  Her laboratory data recently had not shown in January any  findings to explain an organic basis for these.  She has had complement levels, IgG, IgM, and so forth, CBC with platelets, all of this in January of this year, and she had a normal white count and all lab studies.  Her comprehensive lab studies in January were negative.  She medication-wise is on Zyrtec, vitamins, Celexa, donepezil, multivitamins and she has been on levetiracetam, but this was I believe discontinued.      PHYSICAL EXAMINATION:  Blood pressure 102/60.  She answers some questions with fairly normal speech, has normal eye movements and coordination in the upper extremities.  Her arms look very strong.  She can do finger-to-nose testing very well.  Walking, she walks very quickly with good associated movements.  She sways a little bit on the Romberg exam but not abnormally.  She can get up well on heels and toes.      In summary, these episodes appear possibly related to her anxiety disorder.  She has had a full neurological workup before with no findings.  She has an anxiety disorder.  I would not think there is anything else other than that.  She agrees that they probably due to boredom.  We will see her p.r.n. as needed.      Total duration of the video visit was 31 minutes.      Sincerely,            MD ABHAY Odonnell MD             D: 06/15/2020   T: 06/15/2020   MT: [place]      Name:     JOYCE CAMACHO   MRN:      [id number]        Account:      [id number]   :      1960           Service Date: 06/15/2020      Document: [id number]

## 2020-06-15 NOTE — LETTER
"6/15/2020       RE: Valentine Vu  [address] [place] [address]     Dear Colleague,    Thank you for referring your patient, Valentine Vu, to the [facility] NEUROLOGY at [facility]. Please see a copy of my visit note below.    Valentine Vu is a 59 year old female who is being evaluated via a billable video visit.      The patient has been notified of following:     \"This video visit will be conducted via a call between you and your physician/provider. We have found that certain health care needs can be provided without the need for an in-person physical exam.  This service lets us provide the care you need with a video conversation.  If a prescription is necessary we can send it directly to your pharmacy.  If lab work is needed we can place an order for that and you can then stop by our lab to have the test done at a later time.    Video visits are billed at different rates depending on your insurance coverage.  Please reach out to your insurance provider with any questions.    If during the course of the call the physician/provider feels a video visit is not appropriate, you will not be charged for this service.\"    Patient has given verbal consent for Video visit? YES    Will anyone else be joining your video visit? no        Video-Visit Details    Type of service:  Video Visit    Video Start Time: 10 am  Video End Time: 10:25    Originating Location (pt. Location): Home    Distant Location (provider location):  [facility] NEUROLOGY     Platform used for Video Visit: RENATE Sena                    "

## 2020-06-29 ENCOUNTER — TELEPHONE (OUTPATIENT)
Dept: INTERNAL MEDICINE | Facility: CLINIC | Age: 60
End: 2020-06-29

## 2020-07-01 ENCOUNTER — VIRTUAL VISIT (OUTPATIENT)
Dept: PSYCHOLOGY | Facility: CLINIC | Age: 60
End: 2020-07-01
Payer: MEDICARE

## 2020-07-01 ENCOUNTER — FCC EXTENDED DOCUMENTATION (OUTPATIENT)
Dept: PSYCHOLOGY | Facility: CLINIC | Age: 60
End: 2020-07-01

## 2020-07-01 DIAGNOSIS — F41.1 GENERALIZED ANXIETY DISORDER: Primary | ICD-10-CM

## 2020-07-01 PROCEDURE — 90832 PSYTX W PT 30 MINUTES: CPT | Mod: 95 | Performed by: SOCIAL WORKER

## 2020-07-01 ASSESSMENT — ANXIETY QUESTIONNAIRES
1. FEELING NERVOUS, ANXIOUS, OR ON EDGE: NOT AT ALL
4. TROUBLE RELAXING: NOT AT ALL
2. NOT BEING ABLE TO STOP OR CONTROL WORRYING: NOT AT ALL
6. BECOMING EASILY ANNOYED OR IRRITABLE: NOT AT ALL
7. FEELING AFRAID AS IF SOMETHING AWFUL MIGHT HAPPEN: NOT AT ALL
GAD7 TOTAL SCORE: 0
5. BEING SO RESTLESS THAT IT IS HARD TO SIT STILL: NOT AT ALL
3. WORRYING TOO MUCH ABOUT DIFFERENT THINGS: NOT AT ALL

## 2020-07-01 ASSESSMENT — PATIENT HEALTH QUESTIONNAIRE - PHQ9: SUM OF ALL RESPONSES TO PHQ QUESTIONS 1-9: 0

## 2020-07-01 NOTE — PROGRESS NOTES
Progress Note    Patient Name: Valentine Vu  Date: 7/1/2020         Service Type: Video Visit      Session Start Time:  9:32 AM Session End Time: 10:02 AM     Session Length: 16-37 minutes     Session #: 8    Attendees: spoke with  at start of session and met alone with client for remainder of session     Telemedicine Visit: The patient's condition can be safely assessed and treated via synchronous audio and visual telemedicine encounter.      Reason for Telemedicine Visit: Services only offered telehealth    Originating Site (Patient Location): Patient's home    Distant Site (Provider Location): Provider Remote Setting    Consent:  The patient/guardian has verbally consented to: the potential risks and benefits of telemedicine (video visit) versus in person care; bill my insurance or make self-payment for services provided; and responsibility for payment of non-covered services.     Mode of Communication: Video/AmWell     Treatment Plan Last Reviewed:  6/3/2020  PHQ-9 / ORESTES-7 :  7/1/2020    DATA  Interactive Complexity: No  Crisis: No       Progress Since Last Session (Related to Symptoms / Goals / Homework):   Symptoms: stable     Homework: Achieved / completed to satisfaction      Episode of Care Goals: Satisfactory progress - ACTION (Actively working towards change); Intervened by reinforcing change plan / affirming steps taken     Current / Ongoing Stressors and Concerns:   Not currently working     Client's  reported that client has been doing well.  The last couple of weeks she has been able to participate in zoom meetings through her day program, engaging in activities, workouts, and meals with others.  Client and her  reported this has been helpful for client.  Client noted improvements with sleep since being more active.  She reported doing well, denied any concerns.  She reported that weekends can be more difficult because  fewer activities to keep her busy.  Client indicated that if something does bother her she will try to ignore it and use distraction      Treatment Objective(s) Addressed in This Session:   use distraction each time intrusive worry surfaces     Intervention:   recognized progress made    Reinforced behavior changes   Motivational interviewing: use of open-ended questions and reflective listening      ASSESSMENT: Current Emotional / Mental Status (status of significant symptoms):   Risk status (Self / Other harm or suicidal ideation)   Patient denies current fears or concerns for personal safety.   Patient denies current or recent suicidal ideation or behaviors.   Patient denies current or recent homicidal ideation or behaviors.   Patient denies current or recent self injurious behavior or ideation.   Patient denies other safety concerns.   Patient reports there has been no change in risk factors since their last session.     Patient reports there has been no change in protective factors since their last session.     Recommended that patient call 911 or go to the local ED should there be a change in any of these risk factors.     Appearance:   Appropriate    Eye Contact:   Good    Psychomotor Behavior: Normal    Attitude:   Cooperative  Pleasant   Orientation:   All   Speech    Rate / Production: Normal     Volume:  Normal    Mood:    Normal   Affect:    Appropriate  Bright    Thought Content:  Clear    Thought Form:  Coherent    Insight:    Fair      Medication Review:   No changes to current psychiatric medication(s)     Medication Compliance:   Yes     Changes in Health Issues:   None reported     Chemical Use Review:   Substance Use: Chemical use reviewed, no active concerns identified      Tobacco Use: No current tobacco use.      Diagnosis:  Generalized Anxiety Disorder    Collateral Reports Completed:   Not Applicable    PLAN: (Patient Tasks / Therapist Tasks / Other)  Client to continue to engage in  distraction and participate in activities when available.    Arely Miesha, [facility]  7/1/2020                         ______________________________________________________________________    Treatment Plan     Patient's Name: Valentine Vu                        YOB: 1960     Date: 6/3/2020     DSM5 Diagnoses: 300.02 (F41.1) Generalized Anxiety Disorder  Psychosocial / Contextual Factors: lives in a group home  WHODAS: 12     Referral / Collaboration:  Referral to another professional/service is not indicated at this time..     Anticipated number of session or this episode of care: 12        MeasurableTreatment Goal(s) related to diagnosis / functional impairment(s)  Goal 1: Patient's anxiety will remit as evidenced by a decrease in GAD7 score by at least 6 points or below a 5, where symptoms occur fewer than half the days for a minimum of 4 weeks.  Client's Goal:  I will know I've met my goal when I am able to relax.       Objective #A (Patient Action)                          Patient will use at least 3 coping skills for anxiety management in the next 8 weeks.  Status: Continued - Date: 6/3/2020      Intervention(s)  Therapist will teach coping strategies such as grounding techniques and cognitive restructuring to manage anxiety symptoms.     Objective #B  Patient will identify at least 3 triggers for anxiety.  Status: Continued - Date: 6/3/2020       Intervention(s)  Therapist will engage client in discussion to identify cognitions and situations that precede anxiety symptoms.     Objective #C  Patient will use relaxation strategies 1 times per day to reduce the physical symptoms of anxiety.  Status: Continued - Date: 6/3/2020       Intervention(s)  Therapist will teach relaxation strategies and address any barriers in engaging in the strategies..        Patient has reviewed and agreed to the above plan.        Arelycrystal Whiteside, [facility]                  6/3/2020

## 2020-07-01 NOTE — PROGRESS NOTES
Referral/Transfer of an [facility] Client to Another [facility] Therapist    CLIENT'S NAME: Valentine Vu  DATE of Referral/Transfer Request:  July 1, 2020    Referral/Transfer Request Information    Transfer for the same service: Therapist Initiated    Client Phone #:  [phone] (home)        Telephone Information:   Mobile [phone]        Current Therapist: YAHAIRA Olson      Therapist Receiving Referral/Transfer: aPulina Brunson/Nader    Referral/Transfer is for: Individual    Rationale for Referral/Transfer: (to be completed by [facility] staff person initiating the referral)  Situation: (What is going on with the client?)  Client has been stable.  She finds it helpful to check in with someone monthly.      Background: (What is the clinical background or context?)  Client has a history of anxiety with limited insight into symptoms.  There have been minimal symptoms reported in recent months.  Client resides in a group home and gets along well with staff and roommates.      Assessment: (What do you think the problem is?)   Recent stressors have been not working and limited engagement outside her group  home since start of pandemic.  This has improved in the past couple of weeks with  client's day program having activities for client to engage in via zoom Monday-Friday.      Recommendation:   Reference neurology note dated 6/15/2020 for latest visit.  These episodes have decreased in frequency.    I have found it beneficial to check-in with the  at the start of visits to hear their perspective of how client is doing due to her limited insight.    Referral/Transfer Status:    Therapist Initiated Referral:  Therapist receiving referral has been informed of and has accepted the referral/Was routed this form in an inbasket message  Referral/Transfer Completion Date: 7/1/2020 Completed by: Arely Whiteside  LICSW      Arely Whiteside, [facility]  July 1, 2020

## 2020-07-02 ASSESSMENT — ANXIETY QUESTIONNAIRES: GAD7 TOTAL SCORE: 0

## 2020-08-10 ENCOUNTER — VIRTUAL VISIT (OUTPATIENT)
Dept: BEHAVIORAL HEALTH | Facility: CLINIC | Age: 60
End: 2020-08-10
Payer: MEDICARE

## 2020-08-10 DIAGNOSIS — F41.1 GAD (GENERALIZED ANXIETY DISORDER): Primary | ICD-10-CM

## 2020-08-10 PROCEDURE — 90832 PSYTX W PT 30 MINUTES: CPT | Mod: 95 | Performed by: SOCIAL WORKER

## 2020-08-10 NOTE — PROGRESS NOTES
Progress Note    Patient Name: Valentine Vu  Date: 8/10/2020         Service Type: Individual      Session Start Time: 16:00  Session End Time: 16:22     Session Length: 22    Session #: 9 (first 8 with Arely Whiteside [facility])    Attendees: Client and  presented for first part of meeting    Service Modality:  Video Visit:    Telemedicine Visit: The patient's condition can be safely assessed and treated via synchronous audio and visual telemedicine encounter.      Reason for Telemedicine Visit: Services only offered telehealth    Originating Site (Patient Location): Patient's home    Distant Site (Provider Location): Provider Remote Setting    Consent:  The patient/guardian has verbally consented to: the potential risks and benefits of telemedicine (video visit) versus in person care; bill my insurance or make self-payment for services provided; and responsibility for payment of non-covered services.     Patient would like the video invitation sent by: Text to cell phone: [phone]}     Mode of Communication:  Video Conference via Amwell    As the provider I attest to compliance with applicable laws and regulations related to telemedicine.     Treatment Plan Last Reviewed: 6/3/2020  PHQ-9 / ORESTES-7 : 7/1/2020    DATA  Interactive Complexity: No  Crisis: No       Progress Since Last Session (Related to Symptoms / Goals / Homework):   Symptoms: No change reports that things are going well, recently had sister's wedding    Homework: Achieved / completed to satisfaction      Episode of Care Goals: Satisfactory progress - ACTION (Actively working towards change); Intervened by reinforcing change plan / affirming steps taken     Current / Ongoing Stressors and Concerns:   Patient discussed concerns about black outs and working with her doctor on finding an answer for this.     Treatment Objective(s) Addressed in This Session:   use distraction each time  intrusive worry surfaces       Intervention:   discussed patient's success with using skills, what skills she finds helpful and encouraged her continued use of skills        ASSESSMENT: Current Emotional / Mental Status (status of significant symptoms):   Risk status (Self / Other harm or suicidal ideation)   Patient denies current fears or concerns for personal safety.   Patient denies current or recent suicidal ideation or behaviors.   Patient denies current or recent homicidal ideation or behaviors.   Patient denies current or recent self injurious behavior or ideation.   Patient denies other safety concerns.   Patient reports there has been no change in risk factors since their last session.     Patient reports there has been no change in protective factors since their last session.     Recommended that patient call 911 or go to the local ED should there be a change in any of these risk factors.     Appearance:   Appropriate    Eye Contact:   Good    Psychomotor Behavior: Normal    Attitude:   Cooperative  Friendly Pleasant   Orientation:   All   Speech    Rate / Production: Normal     Volume:  Normal    Mood:    Euthymic   Affect:    Appropriate    Thought Content:  Clear    Thought Form:  Coherent    Insight:    Fair      Medication Review:   No changes to current psychiatric medication(s)     Medication Compliance:   Yes     Changes in Health Issues:   Yes: concerns about patient blacking out     Chemical Use Review:   Substance Use: Chemical use reviewed, no active concerns identified      Tobacco Use: No current tobacco use.      Diagnosis:  1. ORESTES (generalized anxiety disorder)        Collateral Reports Completed:   Routed note to PCP    PLAN: (Patient Tasks / Therapist Tasks / Other)  Inform PCP of change in therapist  Patient should continue with skill usage        YAHAIRA Sim                                                          ______________________________________________________________________    Treatment Plan     Patient's Name: Valentine Vu                        YOB: 1960     Date: 6/3/2020     DSM5 Diagnoses: 300.02 (F41.1) Generalized Anxiety Disorder  Psychosocial / Contextual Factors: lives in a group home  WHODAS: 12     Referral / Collaboration:  Referral to another professional/service is not indicated at this time..     Anticipated number of session or this episode of care: 12        MeasurableTreatment Goal(s) related to diagnosis / functional impairment(s)  Goal 1: Patient's anxiety will remit as evidenced by a decrease in GAD7 score by at least 6 points or below a 5, where symptoms occur fewer than half the days for a minimum of 4 weeks.  Client's Goal:  I will know I've met my goal when I am able to relax.       Objective #A (Patient Action)                          Patient will use at least 3 coping skills for anxiety management in the next 8 weeks.  Status: Continued - Date: 6/3/2020      Intervention(s)  Therapist will teach coping strategies such as grounding techniques and cognitive restructuring to manage anxiety symptoms.     Objective #B  Patient will identify at least 3 triggers for anxiety.  Status: Continued - Date: 6/3/2020       Intervention(s)  Therapist will engage client in discussion to identify cognitions and situations that precede anxiety symptoms.     Objective #C  Patient will use relaxation strategies 1 times per day to reduce the physical symptoms of anxiety.  Status: Continued - Date: 6/3/2020       Intervention(s)  Therapist will teach relaxation strategies and address any barriers in engaging in the strategies..        Patient has reviewed and agreed to the above plan.        Arely Whiteside, [facility]                  6/3/2020

## 2020-08-10 NOTE — Clinical Note
Omar Dunlap, Sabrina Lopez,    Your patient presented to [facility] for a diagnostic evaluation today.  They will be beginning individual therapy with me, as YAHAIRA Olson is out on leave.    Please do not hesitate to contact me if you have any questions in regards to Valentine Vu's care.        YAHAIRA Sim  August 10, 2020  [facility]

## 2020-09-09 ENCOUNTER — TELEPHONE (OUTPATIENT)
Dept: BEHAVIORAL HEALTH | Facility: CLINIC | Age: 60
End: 2020-09-09

## 2020-09-09 ENCOUNTER — VIRTUAL VISIT (OUTPATIENT)
Dept: BEHAVIORAL HEALTH | Facility: CLINIC | Age: 60
End: 2020-09-09
Payer: MEDICARE

## 2020-09-09 DIAGNOSIS — F41.1 GAD (GENERALIZED ANXIETY DISORDER): ICD-10-CM

## 2020-09-09 PROCEDURE — 90832 PSYTX W PT 30 MINUTES: CPT | Mod: 95 | Performed by: SOCIAL WORKER

## 2020-09-09 ASSESSMENT — ANXIETY QUESTIONNAIRES
4. TROUBLE RELAXING: NEARLY EVERY DAY
7. FEELING AFRAID AS IF SOMETHING AWFUL MIGHT HAPPEN: NOT AT ALL
5. BEING SO RESTLESS THAT IT IS HARD TO SIT STILL: NEARLY EVERY DAY
3. WORRYING TOO MUCH ABOUT DIFFERENT THINGS: NOT AT ALL
2. NOT BEING ABLE TO STOP OR CONTROL WORRYING: NOT AT ALL
GAD7 TOTAL SCORE: 6
1. FEELING NERVOUS, ANXIOUS, OR ON EDGE: NOT AT ALL
6. BECOMING EASILY ANNOYED OR IRRITABLE: NOT AT ALL

## 2020-09-09 NOTE — TELEPHONE ENCOUNTER
Reason for call: Writer reached out to patient's guardian to introduce self.    Outcome: Writer left message and encouraged patient's guardian to call if she has any questions relating to patient's care.    Follow-up: Writer will await callback.    WILLIAM SimSW

## 2020-09-09 NOTE — PROGRESS NOTES
Progress Note    Patient Name: Valentine Vu  Date: 9/9/2020         Service Type: Individual      Session Start Time: 9:33  Session End Time: 10:56     Session Length: 20    Session #: 10    Attendees: Client and  presented for last half of meeting    Service Modality:  Video Visit:    Telemedicine Visit: The patient's condition can be safely assessed and treated via synchronous audio and visual telemedicine encounter.      Reason for Telemedicine Visit: Services only offered telehealth    Originating Site (Patient Location): Patient's home    Distant Site (Provider Location): Provider Remote Setting    Consent:  The patient/guardian has verbally consented to: the potential risks and benefits of telemedicine (video visit) versus in person care; bill my insurance or make self-payment for services provided; and responsibility for payment of non-covered services.     Patient would like the video invitation sent by: Text to cell phone: [phone]}     Mode of Communication:  Video Conference via Amwell    As the provider I attest to compliance with applicable laws and regulations related to telemedicine.     Treatment Plan Last Reviewed: 9/9/2020  PHQ-9 / ORESTES-7 : 6 - 9/9/2020     DATA  Interactive Complexity: No  Crisis: No       Progress Since Last Session (Related to Symptoms / Goals / Homework):   Symptoms: Improving Classes helping her have things to do and decrease anxiety    Homework: Achieved / completed to satisfaction      Episode of Care Goals: Satisfactory progress - ACTION (Actively working towards change); Intervened by reinforcing change plan / affirming steps taken     Current / Ongoing Stressors and Concerns:   Patient reported getting excited,  reported that this is her anxiety and this happens before classes or before scheduled events.     Treatment Objective(s) Addressed in This Session:   use relaxation strategies 1 times  per day to reduce the physical symptoms of anxiety       Intervention:   CBT: reviewed what emotions patient has been noting.  Discussed her use of skills, attending classes and how this has decreased anxiety.  Reviewed and updated goals with patient.        ASSESSMENT: Current Emotional / Mental Status (status of significant symptoms):   Risk status (Self / Other harm or suicidal ideation)   Patient denies current fears or concerns for personal safety.   Patient denies current or recent suicidal ideation or behaviors.   Patient denies current or recent homicidal ideation or behaviors.   Patient denies current or recent self injurious behavior or ideation.   Patient denies other safety concerns.   Patient reports there has been no change in risk factors since their last session.     Patient reports there has been no change in protective factors since their last session.     Recommended that patient call 911 or go to the local ED should there be a change in any of these risk factors.     Appearance:   Appropriate    Eye Contact:   Good    Psychomotor Behavior: Normal    Attitude:   Cooperative  Friendly Pleasant   Orientation:   All   Speech    Rate / Production: Normal     Volume:  Normal    Mood:    Euthymic   Affect:    Appropriate    Thought Content:  Clear    Thought Form:  Coherent  [place]   Insight:    Fair      Medication Review:   No changes to current psychiatric medication(s)     Medication Compliance:   Yes     Changes in Health Issues:   None reported     Chemical Use Review:   Substance Use: Chemical use reviewed, no active concerns identified      Tobacco Use: No current tobacco use.      Diagnosis:  1. ORESTES (generalized anxiety disorder)        Collateral Reports Completed:   Communicated with: Patient's guardian in regards to transfer of care    PLAN: (Patient Tasks / Therapist Tasks / Other)  Patient will continue skill usage  Group home staff will encourage patient to use breathing or help  distract her when she is experiencing anticipatory anxiety.        Paulina Ridley, LICSW                                                         ______________________________________________________________________    Treatment Plan     Patient's Name: Valentine Vu                        YOB: 1960     Date: 9/9/2020     DSM5 Diagnoses: 300.02 (F41.1) Generalized Anxiety Disorder  Psychosocial / Contextual Factors: lives in a group home, increase in anxiety with COVID and having less social contact  WHODAS: 12     Referral / Collaboration:  Referral to another professional/service is not indicated at this time..     Anticipated number of session or this episode of care: 12        MeasurableTreatment Goal(s) related to diagnosis / functional impairment(s)  Goal 1: Patient's anxiety will remit as evidenced by a decrease in GAD7 score by at least 6 points or below a 5, where symptoms occur fewer than half the days for a minimum of 4 weeks.  Client's Goal:  I will know I've met my goal when I am able to relax.       Objective #A (Patient Action)                          Patient will use at least 3 coping skills for anxiety management in the next 8 weeks.  Status: Completed 9/9/2020     Intervention(s)  Therapist will teach coping strategies such as grounding techniques and cognitive restructuring to manage anxiety symptoms.     Objective #B  Patient will identify at least 3 triggers for anxiety.  Status: Completed 9/9/2020      Intervention(s)  Therapist will engage client in discussion to identify cognitions and situations that precede anxiety symptoms.     Objective #C  Patient will use relaxation strategies 1 times per day to reduce the physical symptoms of anxiety.  Status: Continued - Date:9/9/2020       Intervention(s)  Therapist will teach relaxation strategies and address any barriers in engaging in the strategies..        Patient has reviewed and agreed to the above plan.        Paulina  Keyana, LICSW                     9/9/2020

## 2020-09-10 ASSESSMENT — ANXIETY QUESTIONNAIRES: GAD7 TOTAL SCORE: 6

## 2020-10-12 ENCOUNTER — VIRTUAL VISIT (OUTPATIENT)
Dept: BEHAVIORAL HEALTH | Facility: CLINIC | Age: 60
End: 2020-10-12
Payer: MEDICARE

## 2020-10-12 DIAGNOSIS — F41.1 GAD (GENERALIZED ANXIETY DISORDER): Primary | ICD-10-CM

## 2020-10-12 PROCEDURE — 90832 PSYTX W PT 30 MINUTES: CPT | Mod: 95 | Performed by: SOCIAL WORKER

## 2020-10-12 ASSESSMENT — ANXIETY QUESTIONNAIRES
5. BEING SO RESTLESS THAT IT IS HARD TO SIT STILL: NOT AT ALL
2. NOT BEING ABLE TO STOP OR CONTROL WORRYING: NOT AT ALL
3. WORRYING TOO MUCH ABOUT DIFFERENT THINGS: NOT AT ALL
4. TROUBLE RELAXING: SEVERAL DAYS
1. FEELING NERVOUS, ANXIOUS, OR ON EDGE: NOT AT ALL
7. FEELING AFRAID AS IF SOMETHING AWFUL MIGHT HAPPEN: NOT AT ALL
GAD7 TOTAL SCORE: 1
6. BECOMING EASILY ANNOYED OR IRRITABLE: NOT AT ALL

## 2020-10-12 NOTE — PROGRESS NOTES
Progress Note    Patient Name: Valentine Vu  Date: 9/12/2020         Service Type: Individual      Session Start Time: 10:03  Session End Time: 10:40     Session Length: 37    Session #: 11    Attendees: Client and  presented for first half of meeting    Service Modality:  Video Visit:    Telemedicine Visit: The patient's condition can be safely assessed and treated via synchronous audio and visual telemedicine encounter.      Reason for Telemedicine Visit: Services only offered telehealth    Originating Site (Patient Location): Patient's home    Distant Site (Provider Location): Provider Remote Setting    Consent:  The patient/guardian has verbally consented to: the potential risks and benefits of telemedicine (video visit) versus in person care; bill my insurance or make self-payment for services provided; and responsibility for payment of non-covered services.     Patient would like the video invitation sent by: Text to cell phone: [phone]}     Mode of Communication:  Video Conference via Amwell    As the provider I attest to compliance with applicable laws and regulations related to telemedicine.     Treatment Plan Last Reviewed: 9/9/2020  PHQ-9 / ORESTES-7 : 1 - 10/12/2020     DATA  Interactive Complexity: No  Crisis: No       Progress Since Last Session (Related to Symptoms / Goals / Homework):   Symptoms: No change Patient continues to state doing well and using skills    Homework: Achieved / completed to satisfaction      Episode of Care Goals: Satisfactory progress - MAINTENANCE (Working to maintain change, with risk of relapse); Intervened by continuing to positively reinforce healthy behavior choice      Current / Ongoing Stressors and Concerns:    reported safety concerns that patient has been digging in trash for items to log food.  She reported that staff are helping her to show the food before they throw away.  Also  concerns about her double dipping peanut butter.       Treatment Objective(s) Addressed in This Session:   use distraction each time intrusive worry surfaces  Identify other ways to problem-solve getting needs met       Intervention:   CBT: discussed sign of anxiety is pacing and discussed what patient can do when she is feeling this way, using breathing or talking to others.  Patient also discussed problem-solving concerns that  brought up and was able to identify better ways to handle situations.        ASSESSMENT: Current Emotional / Mental Status (status of significant symptoms):   Risk status (Self / Other harm or suicidal ideation)   Patient denies current fears or concerns for personal safety.   Patient denies current or recent suicidal ideation or behaviors.   Patient denies current or recent homicidal ideation or behaviors.   Patient denies current or recent self injurious behavior or ideation.   Patient denies other safety concerns.   Patient reports there has been no change in risk factors since their last session.     Patient reports there has been no change in protective factors since their last session.     Recommended that patient call 911 or go to the local ED should there be a change in any of these risk factors.     Appearance:   Appropriate    Eye Contact:   Fair    Psychomotor Behavior: Restless    Attitude:   Cooperative  Friendly Pleasant   Orientation:   All   Speech    Rate / Production: Normal     Volume:  Normal    Mood:    Euthymic   Affect:    Appropriate    Thought Content:  Clear    Thought Form:  Coherent  [place]   Insight:    Fair      Medication Review:   No changes to current psychiatric medication(s)     Medication Compliance:   Yes     Changes in Health Issues:   None reported     Chemical Use Review:   Substance Use: Chemical use reviewed, no active concerns identified      Tobacco Use: No current tobacco use.      Diagnosis:  1. ORESTES (generalized anxiety  disorder)        Collateral Reports Completed:   Not Applicable    PLAN: (Patient Tasks / Therapist Tasks / Other)  Patient will continue skill usage  Patient will used identified solution of measuring out peanut butter she wants to eat.        Paulina Ridley, WILLIAMSW                                                         ______________________________________________________________________    Treatment Plan     Patient's Name: Valentine Vu                        YOB: 1960     Date: 9/9/2020     DSM5 Diagnoses: 300.02 (F41.1) Generalized Anxiety Disorder  Psychosocial / Contextual Factors: lives in a group home, increase in anxiety with COVID and having less social contact  WHODAS: 12     Referral / Collaboration:  Referral to another professional/service is not indicated at this time..     Anticipated number of session or this episode of care: 12        MeasurableTreatment Goal(s) related to diagnosis / functional impairment(s)  Goal 1: Patient's anxiety will remit as evidenced by a decrease in GAD7 score by at least 6 points or below a 5, where symptoms occur fewer than half the days for a minimum of 4 weeks.  Client's Goal:  I will know I've met my goal when I am able to relax.       Objective #A (Patient Action)                          Patient will use at least 3 coping skills for anxiety management in the next 8 weeks.  Status: Completed 9/9/2020     Intervention(s)  Therapist will teach coping strategies such as grounding techniques and cognitive restructuring to manage anxiety symptoms.     Objective #B  Patient will identify at least 3 triggers for anxiety.  Status: Completed 9/9/2020      Intervention(s)  Therapist will engage client in discussion to identify cognitions and situations that precede anxiety symptoms.     Objective #C  Patient will use relaxation strategies 1 times per day to reduce the physical symptoms of anxiety.  Status: Continued -  Date:9/9/2020       Intervention(s)  Therapist will teach relaxation strategies and address any barriers in engaging in the strategies..        Patient has reviewed and agreed to the above plan.        YAHAIRA Sim                     9/9/2020

## 2020-10-13 ASSESSMENT — ANXIETY QUESTIONNAIRES: GAD7 TOTAL SCORE: 1

## 2020-11-09 ENCOUNTER — VIRTUAL VISIT (OUTPATIENT)
Dept: BEHAVIORAL HEALTH | Facility: CLINIC | Age: 60
End: 2020-11-09
Payer: MEDICARE

## 2020-11-09 DIAGNOSIS — F41.1 GAD (GENERALIZED ANXIETY DISORDER): Primary | ICD-10-CM

## 2020-11-09 PROCEDURE — 90834 PSYTX W PT 45 MINUTES: CPT | Mod: 95 | Performed by: SOCIAL WORKER

## 2020-11-09 ASSESSMENT — ANXIETY QUESTIONNAIRES
7. FEELING AFRAID AS IF SOMETHING AWFUL MIGHT HAPPEN: NOT AT ALL
6. BECOMING EASILY ANNOYED OR IRRITABLE: NOT AT ALL
1. FEELING NERVOUS, ANXIOUS, OR ON EDGE: SEVERAL DAYS
2. NOT BEING ABLE TO STOP OR CONTROL WORRYING: NOT AT ALL
5. BEING SO RESTLESS THAT IT IS HARD TO SIT STILL: NEARLY EVERY DAY
4. TROUBLE RELAXING: NEARLY EVERY DAY
GAD7 TOTAL SCORE: 7
3. WORRYING TOO MUCH ABOUT DIFFERENT THINGS: NOT AT ALL

## 2020-11-10 ASSESSMENT — ANXIETY QUESTIONNAIRES: GAD7 TOTAL SCORE: 7

## 2020-11-24 ENCOUNTER — TRANSFERRED RECORDS (OUTPATIENT)
Dept: HEALTH INFORMATION MANAGEMENT | Facility: CLINIC | Age: 60
End: 2020-11-24

## 2020-12-03 ENCOUNTER — TELEPHONE (OUTPATIENT)
Dept: INTERNAL MEDICINE | Facility: CLINIC | Age: 60
End: 2020-12-03

## 2020-12-03 NOTE — TELEPHONE ENCOUNTER
Fax received from [employer] - Medication Form for review and signature.  Put in Dr. Dunlap's in basket.

## 2020-12-08 ENCOUNTER — VIRTUAL VISIT (OUTPATIENT)
Dept: PSYCHOLOGY | Facility: CLINIC | Age: 60
End: 2020-12-08
Payer: MEDICARE

## 2020-12-08 DIAGNOSIS — F41.1 GAD (GENERALIZED ANXIETY DISORDER): Primary | ICD-10-CM

## 2020-12-08 PROCEDURE — 90834 PSYTX W PT 45 MINUTES: CPT | Mod: 95 | Performed by: SOCIAL WORKER

## 2020-12-08 ASSESSMENT — ANXIETY QUESTIONNAIRES
4. TROUBLE RELAXING: NOT AT ALL
3. WORRYING TOO MUCH ABOUT DIFFERENT THINGS: SEVERAL DAYS
1. FEELING NERVOUS, ANXIOUS, OR ON EDGE: NOT AT ALL
GAD7 TOTAL SCORE: 4
7. FEELING AFRAID AS IF SOMETHING AWFUL MIGHT HAPPEN: NOT AT ALL
2. NOT BEING ABLE TO STOP OR CONTROL WORRYING: NOT AT ALL
5. BEING SO RESTLESS THAT IT IS HARD TO SIT STILL: NEARLY EVERY DAY
6. BECOMING EASILY ANNOYED OR IRRITABLE: NOT AT ALL

## 2020-12-08 ASSESSMENT — PATIENT HEALTH QUESTIONNAIRE - PHQ9: SUM OF ALL RESPONSES TO PHQ QUESTIONS 1-9: 4

## 2020-12-08 NOTE — PROGRESS NOTES
Progress Note    Patient Name: Valentine Vu  Date: 12/8/2020         Service Type: Individual      Session Start Time: 8:35 AM  Session End Time: 9:15 AM     Session Length: 38-52 minutes    Session #: 13    Attendees: Client and  briefly at the start of the session      Service Modality:  Video Visit:    Telemedicine Visit: The patient's condition can be safely assessed and treated via synchronous audio and visual telemedicine encounter.      Reason for Telemedicine Visit: Services only offered telehealth    Originating Site (Patient Location): Patient's home    Distant Site (Provider Location): Provider Remote Setting    Consent:  The patient/guardian has verbally consented to: the potential risks and benefits of telemedicine (video visit) versus in person care; bill my insurance or make self-payment for services provided; and responsibility for payment of non-covered services.     Patient would like the video invitation sent by: Send to e-mail at: [email or web address]    Mode of Communication:  Video Conference via [employer]    As the provider I attest to compliance with applicable laws and regulations related to telemedicine.     Treatment Plan Last Reviewed: 12/8/2020  PHQ-9 / ORESTES-7 :  4/4 On 12/8/2020    DATA  Interactive Complexity: No  Crisis: No       Progress Since Last Session (Related to Symptoms / Goals / Homework):   Symptoms: anxiety at night about falling back to sleep    Homework: initial session since July 2020      Episode of Care Goals: Satisfactory progress - MAINTENANCE (Working to maintain change, with risk of relapse); Intervened by continuing to positively reinforce healthy behavior choice      Current / Ongoing Stressors and Concerns:   COVID/not working and less engagement out of the house    shared some improvements specific to obsessive compulsive behaviors specific to counting calories and taking food.   Client reported  that her roommate wakes her daily around 3 am and she has difficulty falling back to sleep due to worry related to falling asleep.     Treatment Objective(s) Addressed in This Session:   use distraction each time intrusive worry surfaces      Client participates in zoom classes daily   Client has been unable to walk outside so has started walking around her home.       Intervention:   Reviewed treatment plan  Explored level of functioning   Provided education on use of grounding techniques  Provided education on anxiety      ASSESSMENT: Current Emotional / Mental Status (status of significant symptoms):   Risk status (Self / Other harm or suicidal ideation)   Patient denies current fears or concerns for personal safety.   Patient denies current or recent suicidal ideation or behaviors.   Patient denies current or recent homicidal ideation or behaviors.   Patient denies current or recent self injurious behavior or ideation.   Patient denies other safety concerns.   Patient reports there has been no change in risk factors since their last session.     Patient reports there has been no change in protective factors since their last session.     Recommended that patient call 911 or go to the local ED should there be a change in any of these risk factors.     Appearance:   Appropriate    Eye Contact:   Fair    Psychomotor Behavior: Normal    Attitude:   Cooperative  Friendly Pleasant   Orientation:   All   Speech    Rate / Production: Normal     Volume:  Normal    Mood:    Euthymic   Affect:    Appropriate    Thought Content:  Clear    Thought Form:  Coherent    Insight:    Fair      Medication Review:   No changes to current psychiatric medication(s)     Medication Compliance:   Yes     Changes in Health Issues:   None reported     Chemical Use Review:   Substance Use: Chemical use reviewed, no active concerns identified      Tobacco Use: No current tobacco use.      Diagnosis:  Generalized Anxiety Disorder    Collateral  Reports Completed:   Communicated with: Paulina Del Rosario, [facility]    PLAN: (Patient Tasks / Therapist Tasks / Other)  Therapist sent list of grounding techniques via [employer] and encouraged use when experiencing anxiety and to help improve sleep.  Therapist to continue to assess issues with sleep and provide strategies to cope with worry at night.    Arely Whiteside, [facility] 12/8/2020      ______________________________________________________________________    Treatment Plan     Patient's Name: Valentine Vu                        YOB: 1960     Date: 12/8/2020     DSM5 Diagnoses: 300.02 (F41.1) Generalized Anxiety Disorder  Psychosocial / Contextual Factors: lives in a group home, COVID/not working, less time out of the house  WHODAS: 13     Referral / Collaboration:  Referral to another professional/service is not indicated at this time..     Anticipated number of session or this episode of care: 12        MeasurableTreatment Goal(s) related to diagnosis / functional impairment(s)  Goal 1: Patient's anxiety will remit as evidenced by a decrease in GAD7 score by at least 6 points or below a 5, where symptoms occur fewer than half the days for a minimum of 4 weeks.  Client's Goal:  I will know I've met my goal when I am able to relax.       Objective #A (Patient Action)                          Patient will use at least 3 coping skills for anxiety management in the next 8 weeks.  Status: Completed 9/9/2020     Intervention(s)  Therapist will teach coping strategies such as grounding techniques and cognitive restructuring to manage anxiety symptoms.     Objective #B  Patient will identify at least 3 triggers for anxiety.  Status: Completed 9/9/2020      Intervention(s)  Therapist will engage client in discussion to identify cognitions and situations that precede anxiety symptoms.     Objective #C  Patient will use relaxation strategies 1 times per day to reduce the physical symptoms of  anxiety.  Status: Continued - Date: 9/9/2020, 12/8/2020      Intervention(s)  Therapist will teach relaxation strategies and address any barriers in engaging in the strategies.    Objective #D  Patient will use at least 2 coping strategies to decrease anxiety impacting ability to fall asleep.  Status: New - Date 12/8/2020    Intervention(s)  Therapist will teach coping strategies, such as grounding techniques and progressive muscle relaxation.      Patient has reviewed and agreed to the above plan.      Arely Whiteside, [facility]  12/8/2020

## 2020-12-09 ASSESSMENT — ANXIETY QUESTIONNAIRES: GAD7 TOTAL SCORE: 4

## 2020-12-31 NOTE — TELEPHONE ENCOUNTER
Pt's caretaker through [facility] is calling regarding allergy referral that was given to pt at her appointment yesterday. [facility] does not diagnose mast cell activation so the pt would like a different referral to a place that does. Call back number is the caretakers at [facility] and they can be reached at [phone]. Please advise. Thanks.   88.5

## 2021-01-05 ENCOUNTER — TELEPHONE (OUTPATIENT)
Dept: PSYCHOLOGY | Facility: CLINIC | Age: 61
End: 2021-01-05

## 2021-01-05 ENCOUNTER — VIRTUAL VISIT (OUTPATIENT)
Dept: PSYCHOLOGY | Facility: CLINIC | Age: 61
End: 2021-01-05
Payer: MEDICARE

## 2021-01-05 DIAGNOSIS — Z53.9 NO SHOW: Primary | ICD-10-CM

## 2021-01-05 NOTE — TELEPHONE ENCOUNTER
No Show-Therapist attempted to connect with client via video for appointment.  Therapist left voice message on home and patient cell phone in attempt to connect with client when she did not join session via video.  Therapist received notice from scheduling of client/ calling around 8:49 am.  Appointment rescheduled for 1/29.

## 2021-01-11 ENCOUNTER — OFFICE VISIT (OUTPATIENT)
Dept: INTERNAL MEDICINE | Facility: CLINIC | Age: 61
End: 2021-01-11
Payer: MEDICARE

## 2021-01-11 ENCOUNTER — MEDICAL CORRESPONDENCE (OUTPATIENT)
Dept: HEALTH INFORMATION MANAGEMENT | Facility: CLINIC | Age: 61
End: 2021-01-11

## 2021-01-11 VITALS
OXYGEN SATURATION: 96 % | WEIGHT: 143 LBS | BODY MASS INDEX: 28.07 KG/M2 | SYSTOLIC BLOOD PRESSURE: 110 MMHG | TEMPERATURE: 98 F | HEIGHT: 60 IN | RESPIRATION RATE: 12 BRPM | HEART RATE: 82 BPM | DIASTOLIC BLOOD PRESSURE: 70 MMHG

## 2021-01-11 DIAGNOSIS — I10 BENIGN ESSENTIAL HYPERTENSION: ICD-10-CM

## 2021-01-11 DIAGNOSIS — Z00.00 ROUTINE GENERAL MEDICAL EXAMINATION AT A HEALTH CARE FACILITY: Primary | ICD-10-CM

## 2021-01-11 DIAGNOSIS — F32.5 MAJOR DEPRESSION IN COMPLETE REMISSION (H): ICD-10-CM

## 2021-01-11 DIAGNOSIS — L74.9 SWEATING ABNORMALITY: ICD-10-CM

## 2021-01-11 PROCEDURE — G0439 PPPS, SUBSEQ VISIT: HCPCS | Performed by: INTERNAL MEDICINE

## 2021-01-11 ASSESSMENT — ENCOUNTER SYMPTOMS
FEVER: 0
SORE THROAT: 0
SHORTNESS OF BREATH: 0
WEAKNESS: 0
BREAST MASS: 0
PALPITATIONS: 0
DYSURIA: 0
JOINT SWELLING: 0
FREQUENCY: 0
HEMATOCHEZIA: 0
NERVOUS/ANXIOUS: 1
ARTHRALGIAS: 1
HEMATURIA: 0
COUGH: 0
DIZZINESS: 0
ABDOMINAL PAIN: 0
HEADACHES: 0
DIARRHEA: 0
MYALGIAS: 0
CONSTIPATION: 0
PARESTHESIAS: 0
HEARTBURN: 0
EYE PAIN: 0
NAUSEA: 0
CHILLS: 0

## 2021-01-11 ASSESSMENT — MIFFLIN-ST. JEOR: SCORE: 1127.2

## 2021-01-11 ASSESSMENT — ACTIVITIES OF DAILY LIVING (ADL): CURRENT_FUNCTION: NO ASSISTANCE NEEDED

## 2021-01-11 NOTE — NURSING NOTE
"/70   Pulse 82   Temp 98  F (36.7  C) (Oral)   Resp 12   Ht 1.511 m (4' 11.5\")   Wt 64.9 kg (143 lb)   LMP  (LMP Unknown)   SpO2 96%   Breastfeeding No   BMI 28.40 kg/m      "

## 2021-01-11 NOTE — PROGRESS NOTES
"   SUBJECTIVE:   CC: Valentine Vu is an 60 year old woman who presents for preventive health visit.       Patient has been advised of split billing requirements and indicates understanding: Yes  Healthy Habits:     In general, how would you rate your overall health?  Excellent    Frequency of exercise:  6-7 days/week    Duration of exercise:  30-45 minutes    Do you usually eat at least 4 servings of fruit and vegetables a day, include whole grains    & fiber and avoid regularly eating high fat or \"junk\" foods?  Yes    Taking medications regularly:  Yes    Medication side effects:  None    Ability to successfully perform activities of daily living:  No assistance needed    Home Safety:  No safety concerns identified    Hearing Impairment:  Difficulty following a conversation in a noisy restaurant or crowded room    In the past 6 months, have you been bothered by leaking of urine?  No    In general, how would you rate your overall mental or emotional health?  Excellent      PHQ-2 Total Score: 0    Additional concerns today:  No    Today's PHQ-2 Score:   PHQ-2 ( 1999 Pfizer) 1/11/2021   Q1: Little interest or pleasure in doing things 0   Q2: Feeling down, depressed or hopeless 0   PHQ-2 Score 0   Q1: Little interest or pleasure in doing things Not at all   Q2: Feeling down, depressed or hopeless Not at all   PHQ-2 Score 0       Abuse: Current or Past (Physical, Sexual or Emotional) - No  Do you feel safe in your environment? Yes        Social History     Tobacco Use     Smoking status: Never Smoker     Smokeless tobacco: Never Used   Substance Use Topics     Alcohol use: No     If you drink alcohol do you typically have >3 drinks per day or >7 drinks per week? No    Alcohol Use 1/11/2021   Prescreen: >3 drinks/day or >7 drinks/week? No   Prescreen: >3 drinks/day or >7 drinks/week? -     Reviewed orders with patient.  Reviewed health maintenance and updated orders accordingly - Yes      Mammogram Screening: Patient " "over age 50, mutual decision to screen reflected in health maintenance.    Pertinent mammograms are reviewed under the imaging tab.  History of abnormal Pap smear: NO - age 30-65 PAP every 5 years with negative HPV co-testing recommended  PAP / HPV Latest Ref Rng & Units 2/22/2019   PAP - NIL   HPV 16 DNA NEG:Negative Negative   HPV 18 DNA NEG:Negative Negative   OTHER HR HPV NEG:Negative Negative     Reviewed and updated as needed this visit by clinical staff  Tobacco  Allergies  Meds  Problems  Med Hx  Surg Hx  Fam Hx  Soc Hx          Reviewed and updated as needed this visit by Provider    Meds  Problems              Review of Systems   Constitutional: Negative for chills and fever.   HENT: Negative for congestion, ear pain, hearing loss and sore throat.    Eyes: Negative for pain and visual disturbance.   Respiratory: Negative for cough and shortness of breath.    Cardiovascular: Negative for chest pain, palpitations and peripheral edema.   Gastrointestinal: Negative for abdominal pain, constipation, diarrhea, heartburn, hematochezia and nausea.   Breasts:  Negative for tenderness, breast mass and discharge.   Genitourinary: Negative for dysuria, frequency, genital sores, hematuria, pelvic pain, urgency, vaginal bleeding and vaginal discharge.   Musculoskeletal: Positive for arthralgias. Negative for joint swelling and myalgias.   Skin: Negative for rash.   Neurological: Negative for dizziness, weakness, headaches and paresthesias.   Psychiatric/Behavioral: Negative for mood changes. The patient is nervous/anxious.           OBJECTIVE:   /70   Pulse 82   Temp 98  F (36.7  C) (Oral)   Resp 12   Ht 1.511 m (4' 11.5\")   Wt 64.9 kg (143 lb)   LMP  (LMP Unknown)   SpO2 96%   Breastfeeding No   BMI 28.40 kg/m    Physical Exam  GENERAL APPEARANCE: healthy, alert and no distress  EYES: Eyes grossly normal to inspection, PERRL and conjunctivae and sclerae normal  HENT: ear canals and TM's normal, " nose and mouth without ulcers or lesions, oropharynx clear and oral mucous membranes moist  NECK: no adenopathy, no asymmetry, masses, or scars and thyroid normal to palpation  RESP: lungs clear to auscultation - no rales, rhonchi or wheezes  BREAST: normal without masses, tenderness or nipple discharge and no palpable axillary masses or adenopathy  CV: regular rate and rhythm, normal S1 S2, no S3 or S4, no murmur, click or rub, no peripheral edema and peripheral pulses strong  ABDOMEN: soft, nontender, no hepatosplenomegaly, no masses and bowel sounds normal  MS: no musculoskeletal defects are noted and gait is age appropriate without ataxia  SKIN: no suspicious lesions or rashes  NEURO: Normal strength and tone, sensory exam grossly normal, mentation intact and speech normal  PSYCH: mentation appears normal and affect normal/bright    Patient had an episode of turning erythematous and sweaty.  Patient did not answer for 1 minute.    This is similar to her episodes she has had in the past and is seeing a neurologist and has seen a cardiologist.     Diagnostic Test Results:  Labs reviewed in Epic    ASSESSMENT/PLAN:     (Z00.00) Routine general medical examination at a health care facility  (primary encounter diagnosis)  Comment: assess  Plan: HIV Antigen Antibody Combo, Hepatitis C         antibody, Comprehensive metabolic panel (BMP +         Alb, Alk Phos, ALT, AST, Total. Bili, TP), CBC         with platelets and differential, Lipid panel         reflex to direct LDL Fasting            (F32.5) Major depression in complete remission (H)  Comment: at goal.  PHQ 6/3/2020 7/1/2020 12/8/2020   PHQ-9 Total Score 1 0 4   Q9: Thoughts of better off dead/self-harm past 2 weeks Not at all Not at all Not at all     Plan: celexa    (I10) Benign essential hypertension  Comment: at goal  Plan: monitor    (L74.9) Sweating abnormality  Comment:   Plan: episodes followed by specialist      Patient has been advised of split  "billing requirements and indicates understanding: Yes  COUNSELING:  Reviewed preventive health counseling, as reflected in patient instructions       Regular exercise       Healthy diet/nutrition    Estimated body mass index is 28.4 kg/m  as calculated from the following:    Height as of this encounter: 1.511 m (4' 11.5\").    Weight as of this encounter: 64.9 kg (143 lb).        She reports that she has never smoked. She has never used smokeless tobacco.      Counseling Resources:  ATP IV Guidelines  Pooled Cohorts Equation Calculator  Breast Cancer Risk Calculator  BRCA-Related Cancer Risk Assessment: FHS-7 Tool  FRAX Risk Assessment  ICSI Preventive Guidelines  Dietary Guidelines for Americans, 2010  USDA's MyPlate  ASA Prophylaxis  Lung CA Screening    Sabrina Dunlap MD  [facility]  "

## 2021-01-14 ENCOUNTER — TELEPHONE (OUTPATIENT)
Dept: INTERNAL MEDICINE | Facility: CLINIC | Age: 61
End: 2021-01-14

## 2021-01-14 NOTE — TELEPHONE ENCOUNTER
Standing orders for over the counter/ prn medications form dropped off at . Form in your mailbox to be signed.

## 2021-01-26 DIAGNOSIS — Z00.00 ROUTINE GENERAL MEDICAL EXAMINATION AT A HEALTH CARE FACILITY: ICD-10-CM

## 2021-01-26 LAB
ALBUMIN SERPL-MCNC: 3.9 G/DL (ref 3.4–5)
ALP SERPL-CCNC: 88 U/L (ref 40–150)
ALT SERPL W P-5'-P-CCNC: 38 U/L (ref 0–50)
ANION GAP SERPL CALCULATED.3IONS-SCNC: 6 MMOL/L (ref 3–14)
AST SERPL W P-5'-P-CCNC: 15 U/L (ref 0–45)
BASOPHILS # BLD AUTO: 0 10E9/L (ref 0–0.2)
BASOPHILS NFR BLD AUTO: 0.5 %
BILIRUB SERPL-MCNC: 0.8 MG/DL (ref 0.2–1.3)
BUN SERPL-MCNC: 21 MG/DL (ref 7–30)
CALCIUM SERPL-MCNC: 9.6 MG/DL (ref 8.5–10.1)
CHLORIDE SERPL-SCNC: 107 MMOL/L (ref 94–109)
CHOLEST SERPL-MCNC: 219 MG/DL
CO2 SERPL-SCNC: 26 MMOL/L (ref 20–32)
CREAT SERPL-MCNC: 0.66 MG/DL (ref 0.52–1.04)
DIFFERENTIAL METHOD BLD: ABNORMAL
EOSINOPHIL # BLD AUTO: 0.3 10E9/L (ref 0–0.7)
EOSINOPHIL NFR BLD AUTO: 5.6 %
ERYTHROCYTE [DISTWIDTH] IN BLOOD BY AUTOMATED COUNT: 13.7 % (ref 10–15)
GFR SERPL CREATININE-BSD FRML MDRD: >90 ML/MIN/{1.73_M2}
GLUCOSE SERPL-MCNC: 89 MG/DL (ref 70–99)
HCT VFR BLD AUTO: 46.4 % (ref 35–47)
HCV AB SERPL QL IA: NONREACTIVE
HDLC SERPL-MCNC: 66 MG/DL
HGB BLD-MCNC: 15.3 G/DL (ref 11.7–15.7)
HIV 1+2 AB+HIV1 P24 AG SERPL QL IA: NONREACTIVE
LDLC SERPL CALC-MCNC: 118 MG/DL
LYMPHOCYTES # BLD AUTO: 1.7 10E9/L (ref 0.8–5.3)
LYMPHOCYTES NFR BLD AUTO: 28.6 %
MCH RBC QN AUTO: 29.1 PG (ref 26.5–33)
MCHC RBC AUTO-ENTMCNC: 33 G/DL (ref 31.5–36.5)
MCV RBC AUTO: 88 FL (ref 78–100)
MONOCYTES # BLD AUTO: 0.8 10E9/L (ref 0–1.3)
MONOCYTES NFR BLD AUTO: 13.6 %
NEUTROPHILS # BLD AUTO: 3 10E9/L (ref 1.6–8.3)
NEUTROPHILS NFR BLD AUTO: 51.7 %
NONHDLC SERPL-MCNC: 153 MG/DL
PLATELET # BLD AUTO: 318 10E9/L (ref 150–450)
POTASSIUM SERPL-SCNC: 4.3 MMOL/L (ref 3.4–5.3)
PROT SERPL-MCNC: 7.4 G/DL (ref 6.8–8.8)
RBC # BLD AUTO: 5.25 10E12/L (ref 3.8–5.2)
SODIUM SERPL-SCNC: 139 MMOL/L (ref 133–144)
TRIGL SERPL-MCNC: 174 MG/DL
WBC # BLD AUTO: 5.9 10E9/L (ref 4–11)

## 2021-01-26 PROCEDURE — 85025 COMPLETE CBC W/AUTO DIFF WBC: CPT | Performed by: INTERNAL MEDICINE

## 2021-01-26 PROCEDURE — 80053 COMPREHEN METABOLIC PANEL: CPT | Performed by: INTERNAL MEDICINE

## 2021-01-26 PROCEDURE — 36415 COLL VENOUS BLD VENIPUNCTURE: CPT | Performed by: INTERNAL MEDICINE

## 2021-01-26 PROCEDURE — 87389 HIV-1 AG W/HIV-1&-2 AB AG IA: CPT | Performed by: INTERNAL MEDICINE

## 2021-01-26 PROCEDURE — 86803 HEPATITIS C AB TEST: CPT | Performed by: INTERNAL MEDICINE

## 2021-01-26 PROCEDURE — 80061 LIPID PANEL: CPT | Performed by: INTERNAL MEDICINE

## 2021-01-29 ENCOUNTER — VIRTUAL VISIT (OUTPATIENT)
Dept: PSYCHOLOGY | Facility: CLINIC | Age: 61
End: 2021-01-29
Payer: MEDICARE

## 2021-01-29 DIAGNOSIS — F41.1 GAD (GENERALIZED ANXIETY DISORDER): Primary | ICD-10-CM

## 2021-01-29 PROCEDURE — 90834 PSYTX W PT 45 MINUTES: CPT | Mod: 95 | Performed by: SOCIAL WORKER

## 2021-01-29 ASSESSMENT — ANXIETY QUESTIONNAIRES
4. TROUBLE RELAXING: SEVERAL DAYS
3. WORRYING TOO MUCH ABOUT DIFFERENT THINGS: NOT AT ALL
6. BECOMING EASILY ANNOYED OR IRRITABLE: NOT AT ALL
1. FEELING NERVOUS, ANXIOUS, OR ON EDGE: NOT AT ALL
7. FEELING AFRAID AS IF SOMETHING AWFUL MIGHT HAPPEN: NOT AT ALL
5. BEING SO RESTLESS THAT IT IS HARD TO SIT STILL: NOT AT ALL
2. NOT BEING ABLE TO STOP OR CONTROL WORRYING: NOT AT ALL
GAD7 TOTAL SCORE: 1

## 2021-01-29 ASSESSMENT — PATIENT HEALTH QUESTIONNAIRE - PHQ9: SUM OF ALL RESPONSES TO PHQ QUESTIONS 1-9: 0

## 2021-01-30 ASSESSMENT — ANXIETY QUESTIONNAIRES: GAD7 TOTAL SCORE: 1

## 2021-02-25 DIAGNOSIS — R55 SYNCOPE, UNSPECIFIED SYNCOPE TYPE: Primary | ICD-10-CM

## 2021-02-26 RX ORDER — LEVETIRACETAM 250 MG/1
TABLET ORAL
Qty: 62 TABLET | Refills: 11 | Status: SHIPPED | OUTPATIENT
Start: 2021-02-26 | End: 2022-02-17

## 2021-02-26 NOTE — TELEPHONE ENCOUNTER
Pending Prescriptions:                       Disp   Refills    levETIRAcetam (KEPPRA) 250 MG tablet [Phar*62 tab*11       Sig: TAKE 1 TABLET BY MOUTH TWICE DAILY    Routing refill request to provider for review/approval because:  Drug not on the [facility] refill protocol

## 2021-03-03 ENCOUNTER — VIRTUAL VISIT (OUTPATIENT)
Dept: PSYCHOLOGY | Facility: CLINIC | Age: 61
End: 2021-03-03
Payer: MEDICARE

## 2021-03-03 DIAGNOSIS — F41.1 GAD (GENERALIZED ANXIETY DISORDER): Primary | ICD-10-CM

## 2021-03-03 PROCEDURE — 90834 PSYTX W PT 45 MINUTES: CPT | Mod: 95 | Performed by: SOCIAL WORKER

## 2021-03-03 ASSESSMENT — ANXIETY QUESTIONNAIRES
5. BEING SO RESTLESS THAT IT IS HARD TO SIT STILL: NOT AT ALL
3. WORRYING TOO MUCH ABOUT DIFFERENT THINGS: NOT AT ALL
6. BECOMING EASILY ANNOYED OR IRRITABLE: NOT AT ALL
GAD7 TOTAL SCORE: 1
4. TROUBLE RELAXING: SEVERAL DAYS
2. NOT BEING ABLE TO STOP OR CONTROL WORRYING: NOT AT ALL
7. FEELING AFRAID AS IF SOMETHING AWFUL MIGHT HAPPEN: NOT AT ALL
1. FEELING NERVOUS, ANXIOUS, OR ON EDGE: NOT AT ALL

## 2021-03-03 ASSESSMENT — PATIENT HEALTH QUESTIONNAIRE - PHQ9: SUM OF ALL RESPONSES TO PHQ QUESTIONS 1-9: 0

## 2021-03-03 NOTE — PROGRESS NOTES
"                                           Progress Note    Patient Name: Valentine Vu  Date: 3/3/2021         Service Type: Individual      Session Start Time: 3:36 PM Session End Time:   4:14 PM  Session Length: 38-52 minutes    Session #: 15    Attendees: Client and  at the start of session     Service Modality:      Telemedicine Visit: The patient's condition can be safely assessed and treated via synchronous audio and visual telemedicine encounter.      Reason for Telemedicine Visit: Services only offered telehealth    Originating Site (Patient Location): Patient's home    Distant Site (Provider Location): Provider Remote Setting    Mode of Communication: Telephone  Client did not have access to video due to restrictions on her ipad and experienced issues with [employer]    As the provider I attest to compliance with applicable laws and regulations related to telemedicine.    The patient has been notified of the following:      \"We have found that certain health care needs can be provided without the need for a face to face visit.  This service lets us provide the care you need with a phone conversation.       I will have full access to your [place] medical record during this entire phone call.   I will be taking notes for your medical record.      Since this is like an office visit, we will bill your insurance company for this service.       There are potential benefits and risks of telephone visits (e.g. limits to patient confidentiality) that differ from in-person visits.?  Confidentiality still applies for telephone services, and nobody will record the visit.  It is important to be in a quiet, private space that is free of distractions (including cell phone or other devices) during the visit.??      If during the course of the call I believe a telephone visit is not appropriate, you will not be charged for this service\"     Consent has been obtained for this service by care team member: Yes "      Treatment Plan Last Reviewed: 3/3/2021  PHQ-9 / ORESTES-7 :  3/3/2021     DATA  Interactive Complexity: No  Crisis: No       Progress Since Last Session (Related to Symptoms / Goals / Homework):   Symptoms: stable , client denied symptoms    reported client had some difficulty after learning they wanted her to quarantine between her two vaccinations.  They were able to get her back into her zoom classes and this has been helpful    Homework: Completed in session      Episode of Care Goals: Satisfactory progress - ACTION (Actively working towards change); Intervened by reinforcing change plan / affirming steps taken     Current / Ongoing Stressors and Concerns:   pandemic and less engagement out of the house   Not being able to work this month      Treatment Objective(s) Addressed in This Session:   use at least 2 coping skills for anxiety management in the next 4 weeks      Intervention:   completed flowsheets  Explored level of functioning   Participated in reflective listening, used open-ended questions to engage client in conversation  Reviewed treatment plan  Engaged client in identifying feelings and coping strategies to manage unexpected changes    ASSESSMENT: Current Emotional / Mental Status (status of significant symptoms):   Risk status (Self / Other harm or suicidal ideation)   Patient denies current fears or concerns for personal safety.   Patient denies current or recent suicidal ideation or behaviors.   Patient denies current or recent homicidal ideation or behaviors.   Patient denies current or recent self injurious behavior or ideation.   Patient denies other safety concerns.   Patient reports there has been no change in risk factors since their last session.     Patient reports there has been no change in protective factors since their last session.     Recommended that patient call 911 or go to the local ED should there be a change in any of these risk  factors.     Appearance:   unable to assess    Eye Contact:   unable to assess    Psychomotor Behavior: unable to assess    Attitude:   Cooperative  Pleasant   Orientation:   All   Speech    Rate / Production: Slow some stutter and word finding difficulty    Volume:  Normal    Mood:    stable   Affect:    unable to assess    Thought Content:  Clear    Thought Form:  Coherent    Insight:    Poor      Medication Review:   No changes to current psychiatric medication(s)      Upcoming appointment with psychiatry in March 11     Medication Compliance:   Yes     Changes in Health Issues:   None reported     Chemical Use Review:   Substance Use: Chemical use reviewed, no active concerns identified      Tobacco Use: No current tobacco use.      Diagnosis:  Generalized Anxiety Disorder    Collateral Reports Completed:   Not Applicable    PLAN: (Patient Tasks / Therapist Tasks / Other)  Client encouraged to continue to engage in areas of interest and utilize support and coping strategies when unexpected change to routine occurs.    Arely Whiteside, [facility] 3/3/2021    ______________________________________________________________________    Treatment Plan     Patient's Name: Valentine Vu                        YOB: 1960     Date: 3/3/2021     DSM5 Diagnoses: 300.02 (F41.1) Generalized Anxiety Disorder  Psychosocial / Contextual Factors: lives in a group home, COVID/not working, less time out of the house  WHODAS: 14     Referral / Collaboration:  Referral to another professional/service is not indicated at this time..     Anticipated number of session or this episode of care: will review in 90 days        MeasurableTreatment Goal(s) related to diagnosis / functional impairment(s)  Goal 1: Patient's anxiety will remit as evidenced by a decrease in GAD7 score by at least 6 points or below a 5, where symptoms occur fewer than half the days for a minimum of 4 weeks.  Client's Goal:  I will know I've met my goal when  I am able to relax.       Objective #A (Patient Action)                          Patient will use at least 3 coping skills for anxiety management in the next 8 weeks.  Status: Completed 9/9/2020, restart date 3/3/2021     Intervention(s)  Therapist will teach coping strategies such as grounding techniques and cognitive restructuring to manage anxiety symptoms.     Objective #B   Patient will identify at least 3 triggers for anxiety.  Status: Completed 9/9/2020       Intervention(s)  Therapist will engage client in discussion to identify cognitions and situations that precede anxiety symptoms.     Objective #C  Patient will use relaxation strategies 1 times per day to reduce the physical symptoms of anxiety.  Status: Continued - Date: 9/9/2020, 12/8/2020, 3/3/2021     Intervention(s)  Therapist will teach relaxation strategies and address any barriers in engaging in the strategies.    Objective #D  Patient will use at least 2 coping strategies to decrease anxiety impacting ability to fall asleep.  Status: New - Date 12/8/2020, Completed 3/3/2021    Intervention(s)  Therapist will teach coping strategies, such as grounding techniques and progressive muscle relaxation.      Patient has reviewed and agreed to the above plan.      Arely Whiteside, [facility]  3/3/2021

## 2021-03-04 ASSESSMENT — ANXIETY QUESTIONNAIRES: GAD7 TOTAL SCORE: 1

## 2021-04-13 ENCOUNTER — VIRTUAL VISIT (OUTPATIENT)
Dept: PSYCHOLOGY | Facility: CLINIC | Age: 61
End: 2021-04-13
Payer: MEDICARE

## 2021-04-13 DIAGNOSIS — F41.1 GAD (GENERALIZED ANXIETY DISORDER): Primary | ICD-10-CM

## 2021-04-13 PROCEDURE — 90832 PSYTX W PT 30 MINUTES: CPT | Mod: 95 | Performed by: SOCIAL WORKER

## 2021-04-13 ASSESSMENT — ANXIETY QUESTIONNAIRES
5. BEING SO RESTLESS THAT IT IS HARD TO SIT STILL: NOT AT ALL
6. BECOMING EASILY ANNOYED OR IRRITABLE: NOT AT ALL
2. NOT BEING ABLE TO STOP OR CONTROL WORRYING: NOT AT ALL
1. FEELING NERVOUS, ANXIOUS, OR ON EDGE: NOT AT ALL
4. TROUBLE RELAXING: NOT AT ALL
GAD7 TOTAL SCORE: 0
3. WORRYING TOO MUCH ABOUT DIFFERENT THINGS: NOT AT ALL
7. FEELING AFRAID AS IF SOMETHING AWFUL MIGHT HAPPEN: NOT AT ALL

## 2021-04-13 ASSESSMENT — PATIENT HEALTH QUESTIONNAIRE - PHQ9: SUM OF ALL RESPONSES TO PHQ QUESTIONS 1-9: 3

## 2021-04-13 NOTE — PROGRESS NOTES
"                                           Progress Note    Patient Name: Valentine Vu  Date: 4/13/2021         Service Type: Individual      Session Start Time: 3:40 PM Session End Time:   4:00 PM  (started late due to client not having access to video)  Session Length: 16-32 minutes    Session #: 16    Attendees: Client     Service Modality:      Telemedicine Visit: The patient's condition can be safely assessed and treated via synchronous audio and visual telemedicine encounter.      Reason for Telemedicine Visit: Services only offered telehealth    Originating Site (Patient Location): Patient's home    Distant Site (Provider Location): Provider Remote Setting    Mode of Communication: Telephone  Client did not have access to video due to restrictions on her ipad    As the provider I attest to compliance with applicable laws and regulations related to telemedicine.    The patient has been notified of the following:      \"We have found that certain health care needs can be provided without the need for a face to face visit.  This service lets us provide the care you need with a phone conversation.       I will have full access to your [place] medical record during this entire phone call.   I will be taking notes for your medical record.      Since this is like an office visit, we will bill your insurance company for this service.       There are potential benefits and risks of telephone visits (e.g. limits to patient confidentiality) that differ from in-person visits.?  Confidentiality still applies for telephone services, and nobody will record the visit.  It is important to be in a quiet, private space that is free of distractions (including cell phone or other devices) during the visit.??      If during the course of the call I believe a telephone visit is not appropriate, you will not be charged for this service\"     Consent has been obtained for this service by care team member: Yes      Treatment Plan Last " "Reviewed: 3/3/2021  PHQ-9 / ORESTES-7 :  4/13/2021     DATA  Interactive Complexity: No  Crisis: No       Progress Since Last Session (Related to Symptoms / Goals / Homework):   Symptoms: stable, client denied symptoms, client reported feeling good    Homework: Completed in session      Episode of Care Goals: Satisfactory progress - ACTION (Actively working towards change); Intervened by reinforcing change plan / affirming steps taken     Current / Ongoing Stressors and Concerns:  Client reported having an episode where she \"blacked out\" for a brief moment when out to eat with her sister and brother-in-law; she denied memory of it.  She reported having positive feelings prior to the incident     Treatment Objective(s) Addressed in This Session:   Increase interest, engagement, and pleasure in doing things      Intervention:   completed flowsheets  Explored level of functioning   Used prompting questions to engage client in conversation    ASSESSMENT: Current Emotional / Mental Status (status of significant symptoms):   Risk status (Self / Other harm or suicidal ideation)   Patient denies current fears or concerns for personal safety.   Patient denies current or recent suicidal ideation or behaviors.   Patient denies current or recent homicidal ideation or behaviors.   Patient denies current or recent self injurious behavior or ideation.   Patient denies other safety concerns.   Patient reports there has been no change in risk factors since their last session.     Patient reports there has been no change in protective factors since their last session.     Recommended that patient call 911 or go to the local ED should there be a change in any of these risk factors.     Appearance:   unable to assess    Eye Contact:   unable to assess    Psychomotor Behavior: unable to assess    Attitude:   Cooperative  Pleasant   Orientation:   All   Speech    Rate / Production: Normal/ Responsive    Volume:  Normal "    Mood:    stable   Affect:    unable to assess    Thought Content:  Clear    Thought Form:  Coherent    Insight:    Poor      Medication Review:   No changes to current psychiatric medication(s)      Medication Compliance:   Yes     Changes in Health Issues:   None reported     Chemical Use Review:   Substance Use: no active concerns identified      Tobacco Use: No current tobacco use.      Diagnosis:  Generalized Anxiety Disorder    Collateral Reports Completed:   Not Applicable    PLAN: (Patient Tasks / Therapist Tasks / Other)  Therapist to talk to  about alternative ways of engaging client in appointments via video.    Arely Whiteside, [facility] 4/13/2021      ______________________________________________________________________    Treatment Plan     Patient's Name: Valentine Vu                        YOB: 1960     Date: 3/3/2021     DSM5 Diagnoses: 300.02 (F41.1) Generalized Anxiety Disorder  Psychosocial / Contextual Factors: lives in a group home, COVID/not working, less time out of the house  WHODAS: 14     Referral / Collaboration:  Referral to another professional/service is not indicated at this time..     Anticipated number of session or this episode of care: will review in 90 days        MeasurableTreatment Goal(s) related to diagnosis / functional impairment(s)  Goal 1: Patient's anxiety will remit as evidenced by a decrease in GAD7 score by at least 6 points or below a 5, where symptoms occur fewer than half the days for a minimum of 4 weeks.  Client's Goal:  I will know I've met my goal when I am able to relax.       Objective #A (Patient Action)                          Patient will use at least 3 coping skills for anxiety management in the next 8 weeks.  Status: Completed 9/9/2020, restart date 3/3/2021     Intervention(s)  Therapist will teach coping strategies such as grounding techniques and cognitive restructuring to manage anxiety symptoms.     Objective #B   Patient  will identify at least 3 triggers for anxiety.  Status: Completed 9/9/2020       Intervention(s)  Therapist will engage client in discussion to identify cognitions and situations that precede anxiety symptoms.     Objective #C  Patient will use relaxation strategies 1 times per day to reduce the physical symptoms of anxiety.  Status: Continued - Date: 9/9/2020, 12/8/2020, 3/3/2021     Intervention(s)  Therapist will teach relaxation strategies and address any barriers in engaging in the strategies.    Objective #D  Patient will use at least 2 coping strategies to decrease anxiety impacting ability to fall asleep.  Status: New - Date 12/8/2020, Completed 3/3/2021    Intervention(s)  Therapist will teach coping strategies, such as grounding techniques and progressive muscle relaxation.      Patient has reviewed and agreed to the above plan.      Arely Whiteside, [facility]  3/3/2021

## 2021-04-14 ASSESSMENT — ANXIETY QUESTIONNAIRES: GAD7 TOTAL SCORE: 0

## 2021-04-19 ENCOUNTER — HOSPITAL ENCOUNTER (OUTPATIENT)
Dept: MAMMOGRAPHY | Facility: CLINIC | Age: 61
Discharge: HOME | End: 2021-04-19
Attending: INTERNAL MEDICINE | Admitting: INTERNAL MEDICINE
Payer: MEDICARE

## 2021-04-19 DIAGNOSIS — Z12.31 VISIT FOR SCREENING MAMMOGRAM: ICD-10-CM

## 2021-04-19 PROCEDURE — 77063 BREAST TOMOSYNTHESIS BI: CPT

## 2021-04-26 DIAGNOSIS — M15.0 PRIMARY OSTEOARTHRITIS INVOLVING MULTIPLE JOINTS: ICD-10-CM

## 2021-04-29 RX ORDER — CHOLECALCIFEROL (VITAMIN D3) 50 MCG
TABLET ORAL
Qty: 100 TABLET | Refills: 11 | Status: SHIPPED | OUTPATIENT
Start: 2021-04-29

## 2021-04-29 NOTE — TELEPHONE ENCOUNTER
Pending Prescriptions:                       Disp   Refills    Cholecalciferol (VITAMIN D3) 50 MCG (2000 *100 ta*11       Sig: TAKE 1 TABLET BY MOUTH ONCE DAILY    Routing refill request to provider for review/approval because:  A break in medication

## 2021-05-31 VITALS — WEIGHT: 141 LBS | HEIGHT: 60 IN | BODY MASS INDEX: 27.68 KG/M2

## 2021-06-09 ENCOUNTER — VIRTUAL VISIT (OUTPATIENT)
Dept: PSYCHOLOGY | Facility: CLINIC | Age: 61
End: 2021-06-09
Payer: MEDICARE

## 2021-06-09 DIAGNOSIS — F41.1 GAD (GENERALIZED ANXIETY DISORDER): Primary | ICD-10-CM

## 2021-06-09 PROCEDURE — 90834 PSYTX W PT 45 MINUTES: CPT | Mod: 95 | Performed by: SOCIAL WORKER

## 2021-06-09 ASSESSMENT — ANXIETY QUESTIONNAIRES
GAD7 TOTAL SCORE: 4
6. BECOMING EASILY ANNOYED OR IRRITABLE: SEVERAL DAYS
1. FEELING NERVOUS, ANXIOUS, OR ON EDGE: SEVERAL DAYS
7. FEELING AFRAID AS IF SOMETHING AWFUL MIGHT HAPPEN: NOT AT ALL
3. WORRYING TOO MUCH ABOUT DIFFERENT THINGS: SEVERAL DAYS
5. BEING SO RESTLESS THAT IT IS HARD TO SIT STILL: SEVERAL DAYS
4. TROUBLE RELAXING: NOT AT ALL
2. NOT BEING ABLE TO STOP OR CONTROL WORRYING: NOT AT ALL

## 2021-06-09 ASSESSMENT — PATIENT HEALTH QUESTIONNAIRE - PHQ9: SUM OF ALL RESPONSES TO PHQ QUESTIONS 1-9: 0

## 2021-06-09 NOTE — PROGRESS NOTES
Progress Note    Patient Name: Valentine Vu  Date: 6/9/2021         Service Type: Individual      Session Start Time: 3:38 PM Session End Time: 4:20 PM  (started late due to issues with video)    Session Length: 38-52 minutes    Session #: 17    Attendees: Client, therapist spoke to  for part of the call, obtaining observations of client's level of functioning    Service Modality:  Video Visit:     Telemedicine Visit: The patient's condition can be safely assessed and treated via synchronous audio and visual telemedicine encounter.       Reason for Telemedicine Visit: Services only offered telehealth     Originating Site (Patient Location): Patient's home     Distant Site (Provider Location): Provider Remote Setting     Consent:  The patient/guardian has verbally consented to: the potential risks and benefits of telemedicine (video visit) versus in person care; bill my insurance or make self-payment for services provided; and responsibility for payment of non-covered services.      Patient would like the video invitation sent by: Send to [email or web address]     Mode of Communication:  Video Conference via [employer]     As the provider I attest to compliance with applicable laws and regulations related to telemedicine.                Treatment Plan Last Reviewed: 3/3/2021  PHQ-9 / ORESTES-7 : 6/9/2021    DATA  Interactive Complexity: No  Crisis: No       Progress Since Last Session (Related to Symptoms / Goals / Homework):   Symptoms: see GAD7 and PHQ9    Client reported having two episodes last week where she is described to not be responding for short periods of time   One episode occurred prior to her dentist appt.  Client rated anxiety 8/10 (10 being highest level of anxiety) prior to dentist appt    Homework: Completed in session      Episode of Care Goals: Satisfactory progress - ACTION (Actively working towards change); Intervened by reinforcing change plan  / affirming steps taken     Current / Ongoing Stressors and Concerns:   reported client will have to end her job at the end of the month; client is not aware of this transition at time of the appointment.  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   Patient denies current or recent suicidal ideation or behaviors.   Patient denies current or recent homicidal ideation or behaviors.   Patient denies current or recent self injurious behavior or ideation.   Patient denies other safety concerns.   Patient reports there has been no change in risk factors since their last session.     Patient reports there has been no change in protective factors since their last session.     Recommended that patient call 911 or go to the local ED should there be a change in any of these risk factors.     Appearance:   Appropriate    Eye Contact:   Good    Psychomotor Behavior: Normal    Attitude:   Cooperative  Pleasant   Orientation:   All   Speech    Rate / Production: Normal/ Responsive Stutters    Volume:  Normal    Mood:    stable Anxious   Affect:    Appropriate    Thought Content:  Clear    Thought  Form:  Coherent    Insight:    Poor      Medication Review:   No changes to current psychiatric medication(s)      Medication Compliance:   Yes     Changes in Health Issues:   None reported     Chemical Use Review:   Substance Use: no active concerns identified      Tobacco Use: No current tobacco use.      Diagnosis:  Generalized Anxiety Disorder    Collateral Reports Completed:   Not Applicable    PLAN: (Patient Tasks / Therapist Tasks / Other)  Therapist will review treatment plan in next session.  Therapist provided the following resource, CBT-I .  Therapist will review worksheets client has been completing to identify mood in next session.    Arely Whiteside, [facility] 6/9/2021  ______________________________________________________________________    Treatment Plan     Patient's Name: Valentine Vu                        YOB: 1960     Date: 3/3/2021     DSM5 Diagnoses: 300.02 (F41.1) Generalized Anxiety Disorder  Psychosocial / Contextual Factors: lives in a group home, COVID/not working, less time out of the house  WHODAS: 14     Referral / Collaboration:  Referral to another professional/service is not indicated at this time..     Anticipated number of session or this episode of care: will review in 90 days        MeasurableTreatment Goal(s) related to diagnosis / functional impairment(s)  Goal 1: Patient's anxiety will remit as evidenced by a decrease in GAD7 score by at least 6 points or below a 5, where symptoms occur fewer than half the days for a minimum of 4 weeks.  Client's Goal:  I will know I've met my goal when I am able to relax.       Objective #A (Patient Action)                          Patient will use at least 3 coping skills for anxiety management in the next 8 weeks.  Status: Completed 9/9/2020, restart date 3/3/2021     Intervention(s)  Therapist will teach coping strategies such as grounding techniques and cognitive restructuring to manage anxiety symptoms.     Objective #B    Patient will identify at least 3 triggers for anxiety.  Status: Completed 9/9/2020       Intervention(s)  Therapist will engage client in discussion to identify cognitions and situations that precede anxiety symptoms.     Objective #C  Patient will use relaxation strategies 1 times per day to reduce the physical symptoms of anxiety.  Status: Continued - Date: 9/9/2020, 12/8/2020, 3/3/2021     Intervention(s)  Therapist will teach relaxation strategies and address any barriers in engaging in the strategies.    Objective #D  Patient will use at least 2 coping strategies to decrease anxiety impacting ability to fall asleep.  Status: New - Date 12/8/2020, Completed 3/3/2021    Intervention(s)  Therapist will teach coping strategies, such as grounding techniques and progressive muscle relaxation.      Patient has reviewed and agreed to the above plan.      Arely Whiteside, [facility]  3/3/2021

## 2021-06-10 ASSESSMENT — ANXIETY QUESTIONNAIRES: GAD7 TOTAL SCORE: 4

## 2021-06-14 DIAGNOSIS — E63.9 NUTRITIONAL DEFICIENCY: Primary | ICD-10-CM

## 2021-06-14 NOTE — ANESTHESIA PREPROCEDURE EVALUATION
Anesthesia Evaluation      Patient summary reviewed   History of anesthetic complications     Airway   Mallampati: II  Neck ROM: full   Pulmonary - normal exam                          Cardiovascular - normal exam   Neuro/Psych    (+) anxiety/panic attacks,     Comments: Cognitive decline, developmental delay  Depression   ADHD    Endo/Other - negative ROS      GI/Hepatic/Renal - negative ROS      Other findings: [place]      Dental                         Anesthesia Plan  Planned anesthetic: general endotracheal    ASA 2   Induction: intravenous   Anesthetic plan and risks discussed with: patient    Post-op plan: routine recovery

## 2021-06-14 NOTE — ANESTHESIA POSTPROCEDURE EVALUATION
Patient: Valentine Vu  TRANSANAL EXCISION OF RECTAL CARCINOID TUMOR, PROCTOSCOPY, COLONOSCOPY  Anesthesia type: general    Patient location: PACU  Last vitals:   Vitals:    11/20/17 1040   BP: 144/83   Pulse: (!) 101   Resp: 18   Temp:    SpO2: 96%     Post vital signs: stable  Level of consciousness: awake and responds to simple questions  Post-anesthesia pain: pain controlled  Post-anesthesia nausea and vomiting: no  Pulmonary: unassisted, return to baseline  Cardiovascular: stable and blood pressure at baseline  Hydration: adequate  Anesthetic events: no    QCDR Measures:  ASA# 11 - Cathy-op Cardiac Arrest: ASA11B - Patient did NOT experience unanticipated cardiac arrest  ASA# 12 - Cathy-op Mortality Rate: ASA12B - Patient did NOT die  ASA# 13 - PACU Re-Intubation Rate: ASA13B - Patient did NOT require a new airway mgmt       ASA# 10 - Composite Anes Safety: ASA10A - No serious adverse event    Additional Notes:

## 2021-06-14 NOTE — ANESTHESIA CARE TRANSFER NOTE
Last vitals:   Vitals:    11/20/17 0840   BP: 162/79   Pulse: (!) 126   Resp: 12   Temp: 36.4  C (97.6  F)   SpO2: 100%     Patient's level of consciousness is drowsy  Spontaneous respirations: yes  Maintains airway independently: yes  Dentition unchanged: yes  Oropharynx: oropharynx clear of all foreign objects    QCDR Measures:  ASA# 20 - Surgical Safety Checklist: WHO surgical safety checklist completed prior to induction  PQRS# 430 - Adult PONV Prevention: 4558F - Pt received => 2 anti-emetic agents (different classes) preop & intraop  ASA# 8 - Peds PONV Prevention: NA - Not pediatric patient, not GA or 2 or more risk factors NOT present  PQRS# 424 - Cathy-op Temp Management: 4559F - At least one body temp DOCUMENTED => 35.5C or 95.9F within required timeframe  PQRS# 426 - PACU Transfer Protocol: - Transfer of care checklist used  ASA# 14 - Acute Post-op Pain: ASA14B - Patient did NOT experience pain >= 7 out of 10

## 2021-06-16 NOTE — TELEPHONE ENCOUNTER
Failed protocol.  please route to  team if patient needs an appointment     Merry PALOMINORN BSN  [facility]  [phone]

## 2021-06-18 RX ORDER — MULTIVITAMIN WITH FOLIC ACID 400 MCG
TABLET ORAL
Qty: 30 TABLET | Refills: 11 | Status: SHIPPED | OUTPATIENT
Start: 2021-06-18

## 2021-06-22 ENCOUNTER — TELEPHONE (OUTPATIENT)
Dept: INTERNAL MEDICINE | Facility: CLINIC | Age: 61
End: 2021-06-22

## 2021-06-22 NOTE — TELEPHONE ENCOUNTER
Medication orders dropped off at . Please call house staff person,  Katherin Vila when ready for  [phone].

## 2021-08-31 ENCOUNTER — VIRTUAL VISIT (OUTPATIENT)
Dept: PSYCHOLOGY | Facility: CLINIC | Age: 61
End: 2021-08-31
Payer: MEDICARE

## 2021-08-31 DIAGNOSIS — F41.1 GAD (GENERALIZED ANXIETY DISORDER): Primary | ICD-10-CM

## 2021-08-31 PROCEDURE — 90832 PSYTX W PT 30 MINUTES: CPT | Mod: 95 | Performed by: SOCIAL WORKER

## 2021-08-31 ASSESSMENT — ANXIETY QUESTIONNAIRES
6. BECOMING EASILY ANNOYED OR IRRITABLE: NOT AT ALL
1. FEELING NERVOUS, ANXIOUS, OR ON EDGE: NOT AT ALL
5. BEING SO RESTLESS THAT IT IS HARD TO SIT STILL: NOT AT ALL
7. FEELING AFRAID AS IF SOMETHING AWFUL MIGHT HAPPEN: NOT AT ALL
3. WORRYING TOO MUCH ABOUT DIFFERENT THINGS: NOT AT ALL
2. NOT BEING ABLE TO STOP OR CONTROL WORRYING: NOT AT ALL
GAD7 TOTAL SCORE: 0
4. TROUBLE RELAXING: NOT AT ALL

## 2021-08-31 ASSESSMENT — PATIENT HEALTH QUESTIONNAIRE - PHQ9: SUM OF ALL RESPONSES TO PHQ QUESTIONS 1-9: 3

## 2021-08-31 NOTE — PROGRESS NOTES
"                                           Progress Note    Patient Name: Valentine Vu  Date: 8/31/2021         Service Type: Individual       Session Start Time: 4:16 PM Session End Time: 4:34 PM    (client joined late)  Session Length: 16-37 minutes    Session #: 18    Attendees: Client, therapist briefly spoke with client's  who was not with client on this date    Service Modality:  Phone Visit:  The patient has been notified of the following:      \"We have found that certain health care needs can be provided without the need for a face to face visit.  This service lets us provide the care you need with a phone conversation.       I will have full access to your [place] medical record during this entire phone call.   I will be taking notes for your medical record.      Since this is like an office visit, we will bill your insurance company for this service.       There are potential benefits and risks of telephone visits (e.g. limits to patient confidentiality) that differ from in-person visits.?  Confidentiality still applies for telephone services, and nobody will record the visit.  It is important to be in a quiet, private space that is free of distractions (including cell phone or other devices) during the visit.??      If during the course of the call I believe a telephone visit is not appropriate, you will not be charged for this service\"     Consent has been obtained for this service by care team member: Yes      Telemedicine Visit: The patient's condition can be safely assessed and treated via synchronous audio and visual telemedicine encounter.       Reason for Telemedicine Visit: Services only offered telehealth     Originating Site (Patient Location): Patient's home     Distant Site (Provider Location): Provider Remote Setting     Consent:  The patient/guardian has verbally consented to: the potential risks and benefits of telemedicine (video visit) versus in person care; bill my " insurance or make self-payment for services provided; and responsibility for payment of non-covered services.      Patient would like the video invitation sent by: Send to [email or web address]     Mode of Communication:  Telephone  Client did not have access to video for appointment     As the provider I attest to compliance with applicable laws and regulations related to telemedicine.                Treatment Plan Last Reviewed: 3/3/2021  PHQ-9 / ORESTES-7 : 3/0 on 8/31/2021    DATA  Interactive Complexity: No  Crisis: No       Progress Since Last Session (Related to Symptoms / Goals / Homework):   Symptoms: see GAD7 and PHQ9; client denied any concerns other than uncertainty about why she feels tired      reported that client can get fixated, repeating the same thing to staff      Homework: Achieved / completed to satisfaction   Client reported she has been tracking her mood daily but could not remember where the worksheets were.  She reported her mood is always good.      Episode of Care Goals: Satisfactory progress - ACTION (Actively working towards change); Intervened by reinforcing change plan / affirming steps taken     Current / Ongoing Stressors and Concerns:  Feeling tired /unknown cause     Treatment Objective(s) Addressed in This Session:   Increase interest, engagement, and pleasure in doing things      Intervention:   reviewed flowsheets  Motivational interviewing: expressed empathy/understanding, use of reflective listening and open ended questions, supported autonomy  Used prompting questions to engage client in conversation  CBT: identified feelings, behaviors    ASSESSMENT: Current Emotional / Mental Status (status of significant symptoms):   Risk status (Self / Other harm or suicidal ideation)   Patient denies current fears or concerns for personal safety.   Patient denies current or recent suicidal ideation or behaviors.   Patient denies current or recent homicidal ideation or  behaviors.   Patient denies current or recent self injurious behavior or ideation.   Patient denies other safety concerns.   Patient reports there has been no change in risk factors since their last session.     Patient reports there has been no change in protective factors since their last session.     Recommended that patient call 911 or go to the local ED should there be a change in any of these risk factors.     Appearance:   unable to assess    Eye Contact:   unable to assess    Psychomotor Behavior: unable to assess    Attitude:   Cooperative  Pleasant   Orientation:   All   Speech    Rate / Production: Normal/ Responsive    Volume:  Normal    Mood:    stable    Affect:    unable to assess    Thought Content:  Clear    Thought Form:  Coherent    Insight:    Poor      Medication Review:   No changes to current psychiatric medication(s)      Medication Compliance:   Yes     Changes in Health Issues:   None reported     Chemical Use Review:   Substance Use: no active concerns identified      Tobacco Use: No current tobacco use.      Diagnosis:  Generalized Anxiety Disorder    Collateral Reports Completed:   Not Applicable    PLAN: (Patient Tasks / Therapist Tasks / Other)  Therapist will review treatment plan in next session.  Therapist previously sent the following resource, CBT-I .  Therapist will review worksheets client has been completing to identify mood in next session.   reported she will call to schedule next appointment.  Therapist to assess readiness for discharge, consider referral for art therapy.    Arely Whiteside, [facility] 8/31/2021  ______________________________________________________________________    Treatment Plan     Patient's Name: Valentine Vu                        YOB: 1960     Date: 3/3/2021     DSM5 Diagnoses: 300.02 (F41.1) Generalized Anxiety Disorder  Psychosocial / Contextual Factors: lives in a group home, COVID/not working, less time out of the  house  WHODAS: 14     Referral / Collaboration:  Referral to another professional/service is not indicated at this time..     Anticipated number of session or this episode of care: will review in 90 days        MeasurableTreatment Goal(s) related to diagnosis / functional impairment(s)  Goal 1: Patient's anxiety will remit as evidenced by a decrease in GAD7 score by at least 6 points or below a 5, where symptoms occur fewer than half the days for a minimum of 4 weeks.  Client's Goal:  I will know I've met my goal when I am able to relax.       Objective #A (Patient Action)                          Patient will use at least 3 coping skills for anxiety management in the next 8 weeks.  Status: Completed 9/9/2020, restart date 3/3/2021     Intervention(s)  Therapist will teach coping strategies such as grounding techniques and cognitive restructuring to manage anxiety symptoms.     Objective #B   Patient will identify at least 3 triggers for anxiety.  Status: Completed 9/9/2020       Intervention(s)  Therapist will engage client in discussion to identify cognitions and situations that precede anxiety symptoms.     Objective #C  Patient will use relaxation strategies 1 times per day to reduce the physical symptoms of anxiety.  Status: Continued - Date: 9/9/2020, 12/8/2020, 3/3/2021     Intervention(s)  Therapist will teach relaxation strategies and address any barriers in engaging in the strategies.    Objective #D  Patient will use at least 2 coping strategies to decrease anxiety impacting ability to fall asleep.  Status: New - Date 12/8/2020, Completed 3/3/2021    Intervention(s)  Therapist will teach coping strategies, such as grounding techniques and progressive muscle relaxation.      Patient has reviewed and agreed to the above plan.      Arely Whiteside, [facility]  3/3/2021

## 2021-09-01 ASSESSMENT — ANXIETY QUESTIONNAIRES: GAD7 TOTAL SCORE: 0

## 2021-09-30 ENCOUNTER — VIRTUAL VISIT (OUTPATIENT)
Dept: PSYCHOLOGY | Facility: CLINIC | Age: 61
End: 2021-09-30
Payer: MEDICARE

## 2021-09-30 DIAGNOSIS — F41.1 GAD (GENERALIZED ANXIETY DISORDER): Primary | ICD-10-CM

## 2021-09-30 PROCEDURE — 90834 PSYTX W PT 45 MINUTES: CPT | Mod: 95 | Performed by: SOCIAL WORKER

## 2021-09-30 ASSESSMENT — ANXIETY QUESTIONNAIRES
GAD7 TOTAL SCORE: 1
6. BECOMING EASILY ANNOYED OR IRRITABLE: NOT AT ALL
1. FEELING NERVOUS, ANXIOUS, OR ON EDGE: NOT AT ALL
7. FEELING AFRAID AS IF SOMETHING AWFUL MIGHT HAPPEN: NOT AT ALL
4. TROUBLE RELAXING: NOT AT ALL
2. NOT BEING ABLE TO STOP OR CONTROL WORRYING: NOT AT ALL
3. WORRYING TOO MUCH ABOUT DIFFERENT THINGS: SEVERAL DAYS
5. BEING SO RESTLESS THAT IT IS HARD TO SIT STILL: NOT AT ALL

## 2021-09-30 ASSESSMENT — PATIENT HEALTH QUESTIONNAIRE - PHQ9: SUM OF ALL RESPONSES TO PHQ QUESTIONS 1-9: 1

## 2021-09-30 NOTE — PROGRESS NOTES
"                                           Progress Note    Patient Name: Valentine Vu  Date: 9/30/2021         Service Type: Individual       Session Start Time: 8:05 AM Session End Time: 8:53 AM      Session Length: 38-52 minutes    Session #: 19    Attendees: Client, therapist briefly spoke with client's  at start of session to obtain her observations of patient's functioning    Service Modality:  Telephone visit due to issues with [employer]  The patient has been notified of the following:      \"We have found that certain health care needs can be provided without the need for a face to face visit.  This service lets us provide the care you need with a phone conversation.       I will have full access to your [place] medical record during this entire phone call.   I will be taking notes for your medical record.      Since this is like an office visit, we will bill your insurance company for this service.       There are potential benefits and risks of telephone visits (e.g. limits to patient confidentiality) that differ from in-person visits.?  Confidentiality still applies for telephone services, and nobody will record the visit.  It is important to be in a quiet, private space that is free of distractions (including cell phone or other devices) during the visit.??      If during the course of the call I believe a telephone visit is not appropriate, you will not be charged for this service\"     Consent has been obtained for this service by care team member: Yes        Mode of Communication:  Telephone     As the provider I attest to compliance with applicable laws and regulations related to telemedicine.                Treatment Plan Last Reviewed: 3/3/2021  PHQ-9 / ORESTES-7 : 1/1  On 9/30/2021    DATA  Interactive Complexity: No  Crisis: No       Progress Since Last Session (Related to Symptoms / Goals / Homework):   Symptoms: see GAD7 and PHQ9; client reported some difficulty falling asleep due to " thinking about COVID    Client's  reported she has had a couple more episodes where she stares off for short-period of time and does not engage in her surroundings.  She reported one was the day prior to an appointment with a medical provider, and the second was a couple days following the appointment.  She reported client continues to fixate on things.  Most recently client has been fixated on a family gathering taking place next month.     Client reported she has felt frustrated this week due to having to quarantine because a couple roommates are positive for COVID.    Homework: Achieved / completed to satisfaction   Client continues to journal      Episode of Care Goals: Satisfactory progress - ACTION (Actively working towards change); Intervened by reinforcing change plan / affirming steps taken     Current / Ongoing Stressors and Concerns:  Feeling tired /unknown cause  Quarantining due to two positive COVID cases in group home     Treatment Objective(s) Addressed in This Session:   use at least 2 coping skills for anxiety management in the next 4 weeks      Intervention:   reviewed flowsheets  Motivational interviewing: expressed empathy/understanding, use of reflective listening and open ended questions, supported autonomy  Used prompting questions to engage client in conversation  CBT: identified feelings, behaviors, and cognitions, discussed coping strategies to manage anxiety  Offered validation and support  Provided education on anxiety    ASSESSMENT: Current Emotional / Mental Status (status of significant symptoms):   Risk status (Self / Other harm or suicidal ideation)   Patient denies current fears or concerns for personal safety.   Patient denies current or recent suicidal ideation or behaviors.   Patient denies current or recent homicidal ideation or behaviors.   Patient denies current or recent self injurious behavior or ideation.   Patient denies other safety concerns.   Patient  reports there has been no change in risk factors since their last session.     Patient reports there has been no change in protective factors since their last session.     Recommended that patient call 911 or go to the local ED should there be a change in any of these risk factors.     Appearance:   unable to assess    Eye Contact:   unable to assess    Psychomotor Behavior: unable to assess    Attitude:   Cooperative  Interested   Orientation:   All   Speech    Rate / Production: Normal/ Responsive    Volume:  Normal    Mood:    stable , client reported feeling frustrated with current situation   Affect:    unable to assess    Thought Content:  Clear    Thought Form:  Coherent    Insight:    Poor      Medication Review:   No changes to current psychiatric medication(s)      Medication Compliance:   Yes     Changes in Health Issues:   None reported     Chemical Use Review:   Substance Use: no active concerns identified      Tobacco Use: No current tobacco use.      Diagnosis:  Generalized Anxiety Disorder    Collateral Reports Completed:   Not Applicable    PLAN: (Patient Tasks / Therapist Tasks / Other)  Therapist will review treatment plan and WHODAS in next session.  Therapist previously sent the following resource, CBT-I .  Client reported she has to have her sister download for her.  Therapist to consider art therapy.  Therapist sent grounding techniques, encouraged client to use.  Discussed use of mental exercises to aid with falling asleep.  Client reported she continues to journal her emotions daily.  Discussed client bringing journal to appointment to discuss emotions and coping strategies.    Arely Whiteside, [facility] 9/30/2021  ______________________________________________________________________    Treatment Plan     Patient's Name: Valentine Vu                        YOB: 1960     Date: 3/3/2021     DSM5 Diagnoses: 300.02 (F41.1) Generalized Anxiety Disorder  Psychosocial / Contextual  Factors: lives in a group home, COVID/not working, less time out of the house  WHODAS: 14     Referral / Collaboration:  Referral to another professional/service is not indicated at this time..     Anticipated number of session or this episode of care: will review in 90 days        MeasurableTreatment Goal(s) related to diagnosis / functional impairment(s)  Goal 1: Patient's anxiety will remit as evidenced by a decrease in GAD7 score by at least 6 points or below a 5, where symptoms occur fewer than half the days for a minimum of 4 weeks.  Client's Goal:  I will know I've met my goal when I am able to relax.       Objective #A (Patient Action)                          Patient will use at least 3 coping skills for anxiety management in the next 8 weeks.  Status: Completed 9/9/2020, restart date 3/3/2021     Intervention(s)  Therapist will teach coping strategies such as grounding techniques and cognitive restructuring to manage anxiety symptoms.     Objective #B   Patient will identify at least 3 triggers for anxiety.  Status: Completed 9/9/2020       Intervention(s)  Therapist will engage client in discussion to identify cognitions and situations that precede anxiety symptoms.     Objective #C  Patient will use relaxation strategies 1 times per day to reduce the physical symptoms of anxiety.  Status: Continued - Date: 9/9/2020, 12/8/2020, 3/3/2021     Intervention(s)  Therapist will teach relaxation strategies and address any barriers in engaging in the strategies.    Objective #D  Patient will use at least 2 coping strategies to decrease anxiety impacting ability to fall asleep.  Status: New - Date 12/8/2020, Completed 3/3/2021    Intervention(s)  Therapist will teach coping strategies, such as grounding techniques and progressive muscle relaxation.      Patient has reviewed and agreed to the above plan.      Arely Whiteside, [facility]  3/3/2021

## 2021-10-01 ASSESSMENT — ANXIETY QUESTIONNAIRES: GAD7 TOTAL SCORE: 1

## 2021-10-02 ENCOUNTER — HEALTH MAINTENANCE LETTER (OUTPATIENT)
Age: 61
End: 2021-10-02

## 2021-10-19 PROBLEM — F32.9 MAJOR DEPRESSION: Status: ACTIVE | Noted: 2018-08-03

## 2021-10-28 ENCOUNTER — VIRTUAL VISIT (OUTPATIENT)
Dept: PSYCHOLOGY | Facility: CLINIC | Age: 61
End: 2021-10-28
Payer: MEDICARE

## 2021-10-28 DIAGNOSIS — F41.1 GAD (GENERALIZED ANXIETY DISORDER): Primary | ICD-10-CM

## 2021-10-28 PROCEDURE — 90832 PSYTX W PT 30 MINUTES: CPT | Mod: 95 | Performed by: SOCIAL WORKER

## 2021-10-28 NOTE — PROGRESS NOTES
Progress Note    Patient Name: Valentine Vu  Date: 10/28/2021         Service Type: Individual       Session Start Time: 8:07 AM Session End Time: 8:40 AM    (started late due to issues with technology)    Session Length: 16-37 minutes    Session #: 20    Attendees: Client, therapist briefly spoke with client's  at end of session     Service Modality:  Video Visit:     Telemedicine Visit: The patient's condition can be safely assessed and treated via synchronous audio and visual telemedicine encounter.       Reason for Telemedicine Visit: Services only offered telehealth     Originating Site (Patient Location): Patient's home     Distant Site (Provider Location): Provider Remote Setting     Consent:  The patient/guardian has verbally consented to: the potential risks and benefits of telemedicine (video visit) versus in person care; bill my insurance or make self-payment for services provided; and responsibility for payment of non-covered services.      Patient would like the video invitation sent by: Send to [email or web address]     Mode of Communication:  Video Conference via [employer]     As the provider I attest to compliance with applicable laws and regulations related to telemedicine.                Treatment Plan Last Reviewed: 10/28/2021  PHQ-9 / ORESTES-7 : 1/1  On 9/30/2021    DATA  Interactive Complexity: No  Crisis: No       Progress Since Last Session (Related to Symptoms / Goals / Homework):   Symptoms: client and client's staff reported client having 6-8 episodes while with her family   Client is described as not being able to engage with others during these episodes.     Client denied recent stressors    Homework: Achieved / completed to satisfaction   Client continues to use her journal to document her emotions      Episode of Care Goals: Satisfactory progress - ACTION (Actively working towards change); Intervened by reinforcing change plan /  affirming steps taken     Current / Ongoing Stressors and Concerns:  COVID  Uncertainty of what is causing episodes; believed to be anxiety related     Treatment Objective(s) Addressed in This Session:   use at least 1 coping skills for anxiety management in the next 4 weeks      Intervention:   reviewed flowsheets and treatment plan  Motivational interviewing: expressed empathy/understanding, use of reflective listening and open ended questions, supported autonomy  Used prompting questions to engage client in conversation  CBT: identified feelings, behaviors  Therapist discussed additional resources that may be able to meet client's needs more than current therapist is able to    ASSESSMENT: Current Emotional / Mental Status (status of significant symptoms):   Risk status (Self / Other harm or suicidal ideation)   Patient denies current fears or concerns for personal safety.   Patient denies current or recent suicidal ideation or behaviors.   Patient denies current or recent homicidal ideation or behaviors.   Patient denies current or recent self injurious behavior or ideation.   Patient denies other safety concerns.   Patient reports there has been no change in risk factors since their last session.     Patient reports there has been no change in protective factors since their last session.     Recommended that patient call 911 or go to the local ED should there be a change in any of these risk factors.     Appearance:   Appropriate    Eye Contact:   Good    Psychomotor Behavior: Normal    Attitude:   Cooperative  Interested   Orientation:   All   Speech    Rate / Production: Normal/ Responsive    Volume:  Normal    Mood:    stable    Affect:    Appropriate    Thought Content:  Clear    Thought Form:  Coherent    Insight:    Poor      Medication Review:   No changes to current psychiatric medication(s)      Medication Compliance:   Yes     Changes in Health Issues:   None reported     Chemical Use  Review:   Substance Use: no active concerns identified      Tobacco Use: No current tobacco use.      Diagnosis:  Generalized Anxiety Disorder    Collateral Reports Completed:   Not Applicable    PLAN: (Patient Tasks / Therapist Tasks / Other)  Therapist sent resources to  for art therapy and individual therapy that offers in-person.   Plan for discharge due to transfer during next appointment.    Arelycrystal Whiteside, [facility] 10/28/2021  ______________________________________________________________________    Treatment Plan     Patient's Name: Valentine Vu                        YOB: 1960     Date: 3/3/2021, 10/28/2021     DSM5 Diagnoses: 300.02 (F41.1) Generalized Anxiety Disorder  Psychosocial / Contextual Factors: lives in a group home, COVID/not working, less time out of the house  WHODAS: 14     Referral / Collaboration:  Referral to another professional/service is not indicated at this time..     Anticipated number of session or this episode of care: will review in 90 days        MeasurableTreatment Goal(s) related to diagnosis / functional impairment(s)  Goal 1: Patient's anxiety will remit as evidenced by a decrease in GAD7 score by at least 6 points or below a 5, where symptoms occur fewer than half the days for a minimum of 4 weeks.  Client's Goal:  I will know I've met my goal when I am able to relax.       Objective #A (Patient Action)                          Patient will use at least 3 coping skills for anxiety management in the next 8 weeks.  Status: Completed 9/9/2020, restart date 3/3/2021, continued date: 10/28/2021     Intervention(s)  Therapist will teach coping strategies such as grounding techniques and cognitive restructuring to manage anxiety symptoms.     Objective #B   Patient will identify at least 3 triggers for anxiety.  Status: Completed 9/9/2020       Intervention(s)  Therapist will engage client in discussion to identify cognitions and situations that precede  anxiety symptoms.     Objective #C  Patient will use relaxation strategies 1 times per day to reduce the physical symptoms of anxiety.  Status: Continued - Date: 9/9/2020, 12/8/2020, 3/3/2021, 10/28/2021      Intervention(s)  Therapist will teach relaxation strategies and address any barriers in engaging in the strategies.    Objective #D  Patient will use at least 2 coping strategies to decrease anxiety impacting ability to fall asleep.  Status: New - Date 12/8/2020, Completed 3/3/2021    Intervention(s)  Therapist will teach coping strategies, such as grounding techniques and progressive muscle relaxation.      Patient has reviewed and agreed to the above plan.      Arely Whiteside, [facility]  3/3/2021  Arely Whiteside, [facility]  10/28/2021

## 2021-11-18 ENCOUNTER — VIRTUAL VISIT (OUTPATIENT)
Dept: PSYCHOLOGY | Facility: CLINIC | Age: 61
End: 2021-11-18
Payer: MEDICARE

## 2021-11-18 DIAGNOSIS — F41.1 GAD (GENERALIZED ANXIETY DISORDER): Primary | ICD-10-CM

## 2021-11-18 PROCEDURE — 90832 PSYTX W PT 30 MINUTES: CPT | Mod: 95 | Performed by: SOCIAL WORKER

## 2021-11-18 ASSESSMENT — ANXIETY QUESTIONNAIRES
1. FEELING NERVOUS, ANXIOUS, OR ON EDGE: NOT AT ALL
7. FEELING AFRAID AS IF SOMETHING AWFUL MIGHT HAPPEN: NOT AT ALL
3. WORRYING TOO MUCH ABOUT DIFFERENT THINGS: NOT AT ALL
GAD7 TOTAL SCORE: 2
6. BECOMING EASILY ANNOYED OR IRRITABLE: NOT AT ALL
4. TROUBLE RELAXING: NOT AT ALL
5. BEING SO RESTLESS THAT IT IS HARD TO SIT STILL: MORE THAN HALF THE DAYS
2. NOT BEING ABLE TO STOP OR CONTROL WORRYING: NOT AT ALL

## 2021-11-18 NOTE — PROGRESS NOTES
Progress Note    Patient Name: Valentine Vu  Date: 11/18/2021         Service Type: Individual       Session Start Time: 8:02 AM Session End Time: 8:24 AM    Session Length: 16-37 minutes    Session #: 21    Attendees: Client,  present for end of appointment     Service Modality:  Video Visit:     Telemedicine Visit: The patient's condition can be safely assessed and treated via synchronous audio and visual telemedicine encounter.       Reason for Telemedicine Visit: Services only offered telehealth     Originating Site (Patient Location): Patient's home     Distant Site (Provider Location): Provider Remote Setting     Consent:  The patient/guardian has verbally consented to: the potential risks and benefits of telemedicine (video visit) versus in person care; bill my insurance or make self-payment for services provided; and responsibility for payment of non-covered services.      Patient would like the video invitation sent by: Send to [email or web address]     Mode of Communication:  Video Conference via Amwell     As the provider I attest to compliance with applicable laws and regulations related to telemedicine.                Treatment Plan Last Reviewed: 10/28/2021  PHQ-9 / ORESTES-7 : 0/2 on 11/18/2021    DATA  Interactive Complexity: No  Crisis: No       Progress Since Last Session (Related to Symptoms / Goals / Homework):   Symptoms: see GAD7 and PHQ9, stable    Client denied any episodes since last appointment    Homework: Completed in session       Episode of Care Goals: Satisfactory progress - ACTION (Actively working towards change); Intervened by reinforcing change plan / affirming steps taken     Current / Ongoing Stressors and Concerns:  Uncertainty of what is causing episodes; believed to be anxiety related     Treatment Objective(s) Addressed in This Session:   identify 1 emotion   Identify resources for referral     Intervention:   reviewed  flowsheets   Used prompting questions to engage client in conversation  CBT: identified feelings, behaviors  Therapist engaged client and staff in discussion about referring to art therapist     ASSESSMENT: Current Emotional / Mental Status (status of significant symptoms):   Risk status (Self / Other harm or suicidal ideation)   Patient denies current fears or concerns for personal safety.   Patient denies current or recent suicidal ideation or behaviors.   Patient denies current or recent homicidal ideation or behaviors.   Patient denies current or recent self injurious behavior or ideation.   Patient denies other safety concerns.   Patient reports there has been no change in risk factors since their last session.     Patient reports there has been no change in protective factors since their last session.     Recommended that patient call 911 or go to the local ED should there be a change in any of these risk factors.     Appearance:   Appropriate    Eye Contact:   Good    Psychomotor Behavior: Normal    Attitude:   Cooperative  Interested Pleasant   Orientation:   All   Speech    Rate / Production: Normal/ Responsive    Volume:  Normal    Mood:    stable    Affect:    Appropriate Smiling   Thought Content:  Clear    Thought Form:  Coherent    Insight:    Poor      Medication Review:   No changes to current psychiatric medication(s)      Medication Compliance:   Yes     Changes in Health Issues:   None reported     Chemical Use Review:   Substance Use: no active concerns identified      Tobacco Use: No current tobacco use.      Diagnosis:  Generalized Anxiety Disorder    Collateral Reports Completed:   Not Applicable    PLAN: (Patient Tasks / Therapist Tasks / Other)  Therapist previously sent resources to  for art therapy.   reported they made a call but did not receive a call back and plan to follow-up.  Therapist encouraged  to reach out in-between appointments if there  are any issues with client getting established with a new provider.  Plan for discharge due to transfer during next appointment.    Arelycrystal Whiteside, [facility] 11/18/2021  ______________________________________________________________________    Treatment Plan     Patient's Name: Valentine Vu                        YOB: 1960     Date: 3/3/2021, 10/28/2021     DSM5 Diagnoses: 300.02 (F41.1) Generalized Anxiety Disorder  Psychosocial / Contextual Factors: lives in a group home, COVID/not working, less time out of the house  WHODAS: 14     Referral / Collaboration:  Referral to another professional/service is not indicated at this time..     Anticipated number of session or this episode of care: will review in 90 days        MeasurableTreatment Goal(s) related to diagnosis / functional impairment(s)  Goal 1: Patient's anxiety will remit as evidenced by a decrease in GAD7 score by at least 6 points or below a 5, where symptoms occur fewer than half the days for a minimum of 4 weeks.  Client's Goal:  I will know I've met my goal when I am able to relax.       Objective #A (Patient Action)                          Patient will use at least 3 coping skills for anxiety management in the next 8 weeks.  Status: Completed 9/9/2020, restart date 3/3/2021, continued date: 10/28/2021     Intervention(s)  Therapist will teach coping strategies such as grounding techniques and cognitive restructuring to manage anxiety symptoms.     Objective #B   Patient will identify at least 3 triggers for anxiety.  Status: Completed 9/9/2020       Intervention(s)  Therapist will engage client in discussion to identify cognitions and situations that precede anxiety symptoms.     Objective #C  Patient will use relaxation strategies 1 times per day to reduce the physical symptoms of anxiety.  Status: Continued - Date: 9/9/2020, 12/8/2020, 3/3/2021, 10/28/2021      Intervention(s)  Therapist will teach relaxation strategies and address any  barriers in engaging in the strategies.    Objective #D  Patient will use at least 2 coping strategies to decrease anxiety impacting ability to fall asleep.  Status: New - Date 12/8/2020, Completed 3/3/2021    Intervention(s)  Therapist will teach coping strategies, such as grounding techniques and progressive muscle relaxation.      Patient has reviewed and agreed to the above plan.      Arely Whiteside, [facility]  3/3/2021  Arely Whiteside, [facility]  10/28/2021

## 2021-11-19 ASSESSMENT — ANXIETY QUESTIONNAIRES: GAD7 TOTAL SCORE: 2

## 2021-11-19 ASSESSMENT — PATIENT HEALTH QUESTIONNAIRE - PHQ9: SUM OF ALL RESPONSES TO PHQ QUESTIONS 1-9: 0

## 2021-12-14 ENCOUNTER — TRANSFERRED RECORDS (OUTPATIENT)
Dept: HEALTH INFORMATION MANAGEMENT | Facility: CLINIC | Age: 61
End: 2021-12-14
Payer: MEDICARE

## 2021-12-23 ENCOUNTER — VIRTUAL VISIT (OUTPATIENT)
Dept: PSYCHOLOGY | Facility: CLINIC | Age: 61
End: 2021-12-23
Payer: MEDICARE

## 2021-12-23 DIAGNOSIS — F41.1 GAD (GENERALIZED ANXIETY DISORDER): Primary | ICD-10-CM

## 2021-12-23 PROCEDURE — 90832 PSYTX W PT 30 MINUTES: CPT | Mod: 95 | Performed by: SOCIAL WORKER

## 2021-12-23 ASSESSMENT — ANXIETY QUESTIONNAIRES
GAD7 TOTAL SCORE: 3
5. BEING SO RESTLESS THAT IT IS HARD TO SIT STILL: NOT AT ALL
1. FEELING NERVOUS, ANXIOUS, OR ON EDGE: NOT AT ALL
2. NOT BEING ABLE TO STOP OR CONTROL WORRYING: NOT AT ALL
6. BECOMING EASILY ANNOYED OR IRRITABLE: NOT AT ALL
4. TROUBLE RELAXING: NEARLY EVERY DAY
7. FEELING AFRAID AS IF SOMETHING AWFUL MIGHT HAPPEN: NOT AT ALL
3. WORRYING TOO MUCH ABOUT DIFFERENT THINGS: NOT AT ALL

## 2021-12-23 NOTE — PROGRESS NOTES
Progress Note    Patient Name: Valentine Vu  Date: 12/23/2021         Service Type: Individual       Session Start Time: 8:05 AM Session End Time: 8:32 AM    Session Length: 16-37 minutes    Session #: 22    Attendees: Client, spoke briefly with     Service Modality:  Video Visit:     Telemedicine Visit: The patient's condition can be safely assessed and treated via synchronous audio and visual telemedicine encounter.       Reason for Telemedicine Visit: Services only offered telehealth     Originating Site (Patient Location): Patient's home     Distant Site (Provider Location): Provider Remote Setting     Consent:  The patient/guardian has verbally consented to: the potential risks and benefits of telemedicine (video visit) versus in person care; bill my insurance or make self-payment for services provided; and responsibility for payment of non-covered services.      Patient would like the video invitation sent by: Send to [email or web address]     Mode of Communication:  Video Conference via Amwell     As the provider I attest to compliance with applicable laws and regulations related to telemedicine.                Treatment Plan Last Reviewed: 10/28/2021  PHQ-9 0 on 11/18/2021  ORESTES-7 3 on 12/23/2021    DATA  Interactive Complexity: No  Crisis: No       Progress Since Last Session (Related to Symptoms / Goals / Homework):   Symptoms: see GAD7; some worry related to uncertainty around [place] plans    Homework: Completed in session       Episode of Care Goals: Satisfactory progress - ACTION (Actively working towards change); Intervened by reinforcing change plan / affirming steps taken     Current / Ongoing Stressors and Concerns:  Uncertainty of what is causing episodes; believed to be anxiety related  Uncertainty of what she would be doing on [place] was creating worry     Treatment Objective(s) Addressed in This Session:   identify 1 fears /  thoughts that contribute to feeling anxious   Use distraction to manage worry     Intervention:   reviewed flowsheets   Used prompting questions to engage client in conversation  CBT: identified feelings, behaviors, discussed use of coping strategies to manage worry  Therapist engaged client and staff in discussion about referrals    ASSESSMENT: Current Emotional / Mental Status (status of significant symptoms):   Risk status (Self / Other harm or suicidal ideation)   Patient denies current fears or concerns for personal safety.   Patient denies current or recent suicidal ideation or behaviors.   Patient denies current or recent homicidal ideation or behaviors.   Patient denies current or recent self injurious behavior or ideation.   Patient denies other safety concerns.   Patient reports there has been no change in risk factors since their last session.     Patient reports there has been no change in protective factors since their last session.     Recommended that patient call 911 or go to the local ED should there be a change in any of these risk factors.     Appearance:   Appropriate    Eye Contact:   Good    Psychomotor Behavior: Restless    Attitude:   Cooperative  Interested Pleasant   Orientation:   All   Speech    Rate / Production: Normal/ Responsive    Volume:  Normal    Mood:    stable    Affect:    Appropriate Smiling   Thought Content:  Clear    Thought Form:  Coherent    Insight:    Poor      Medication Review:   No changes to current psychiatric medication(s)      Medication Compliance:   Yes     Changes in Health Issues:   None reported     Chemical Use Review:   Substance Use: no active concerns identified      Tobacco Use: No current tobacco use.      Diagnosis:  Generalized Anxiety Disorder    Collateral Reports Completed:   Not Applicable    PLAN: (Patient Tasks / Therapist Tasks / Other)  Therapist previously sent resources to  for art therapy.  Staff reported client is on the wait  list for an art therapist.  Staff reported they plan to make calls to other agencies in attempt to get client established with an in-person therapist.  Therapist will continue to meet with client until she transfers to a new therapist.  Referrals were provided to house staff.  Client will continue to use distraction to manage worry.    Arely Whiteside, [facility] 12/23/2021  ______________________________________________________________________    Treatment Plan     Patient's Name: Valentine Vu                        YOB: 1960     Date: 3/3/2021, 10/28/2021     DSM5 Diagnoses: 300.02 (F41.1) Generalized Anxiety Disorder  Psychosocial / Contextual Factors: lives in a group home, COVID/not working, less time out of the house  WHODAS: 14     Referral / Collaboration:  Referral to another professional/service is not indicated at this time..     Anticipated number of session or this episode of care: will review in 90 days        MeasurableTreatment Goal(s) related to diagnosis / functional impairment(s)  Goal 1: Patient's anxiety will remit as evidenced by a decrease in GAD7 score by at least 6 points or below a 5, where symptoms occur fewer than half the days for a minimum of 4 weeks.  Client's Goal:  I will know I've met my goal when I am able to relax.       Objective #A (Patient Action)                          Patient will use at least 3 coping skills for anxiety management in the next 8 weeks.  Status: Completed 9/9/2020, restart date 3/3/2021, continued date: 10/28/2021     Intervention(s)  Therapist will teach coping strategies such as grounding techniques and cognitive restructuring to manage anxiety symptoms.     Objective #B   Patient will identify at least 3 triggers for anxiety.  Status: Completed 9/9/2020       Intervention(s)  Therapist will engage client in discussion to identify cognitions and situations that precede anxiety symptoms.     Objective #C  Patient will use relaxation  strategies 1 times per day to reduce the physical symptoms of anxiety.  Status: Continued - Date: 9/9/2020, 12/8/2020, 3/3/2021, 10/28/2021      Intervention(s)  Therapist will teach relaxation strategies and address any barriers in engaging in the strategies.    Objective #D  Patient will use at least 2 coping strategies to decrease anxiety impacting ability to fall asleep.  Status: New - Date 12/8/2020, Completed 3/3/2021    Intervention(s)  Therapist will teach coping strategies, such as grounding techniques and progressive muscle relaxation.      Patient has reviewed and agreed to the above plan.      Arely Whiteside, [facility]  3/3/2021  Arely Whiteside, [facility]  10/28/2021

## 2021-12-24 ASSESSMENT — ANXIETY QUESTIONNAIRES: GAD7 TOTAL SCORE: 3

## 2022-01-28 ENCOUNTER — VIRTUAL VISIT (OUTPATIENT)
Dept: PSYCHOLOGY | Facility: CLINIC | Age: 62
End: 2022-01-28
Payer: MEDICARE

## 2022-01-28 DIAGNOSIS — F41.1 GAD (GENERALIZED ANXIETY DISORDER): Primary | ICD-10-CM

## 2022-01-28 PROCEDURE — 90832 PSYTX W PT 30 MINUTES: CPT | Mod: 95 | Performed by: SOCIAL WORKER

## 2022-01-28 ASSESSMENT — PATIENT HEALTH QUESTIONNAIRE - PHQ9: SUM OF ALL RESPONSES TO PHQ QUESTIONS 1-9: 0

## 2022-01-28 ASSESSMENT — ANXIETY QUESTIONNAIRES
GAD7 TOTAL SCORE: 1
7. FEELING AFRAID AS IF SOMETHING AWFUL MIGHT HAPPEN: NOT AT ALL
6. BECOMING EASILY ANNOYED OR IRRITABLE: NOT AT ALL
1. FEELING NERVOUS, ANXIOUS, OR ON EDGE: SEVERAL DAYS
2. NOT BEING ABLE TO STOP OR CONTROL WORRYING: NOT AT ALL
4. TROUBLE RELAXING: NOT AT ALL
5. BEING SO RESTLESS THAT IT IS HARD TO SIT STILL: NOT AT ALL
3. WORRYING TOO MUCH ABOUT DIFFERENT THINGS: NOT AT ALL

## 2022-01-28 NOTE — PROGRESS NOTES
Discharge Summary  Multiple Sessions    Client Name: Valentine Vu MRN#: [id number] YOB: 1960    Discharge Date:   January 28, 2022    Service Modality: Video Visit:      Provider verified identity through the following two step process.  Patient provided:  Patient is known previously to provider    Telemedicine Visit: The patient's condition can be safely assessed and treated via synchronous audio and visual telemedicine encounter.      Reason for Telemedicine Visit: Services only offered telehealth    Originating Site (Patient Location): Patient's home    Distant Site (Provider Location): Provider Remote Setting- Home Office    Consent:  The patient/guardian has verbally consented to: the potential risks and benefits of telemedicine (video visit) versus in person care; bill my insurance or make self-payment for services provided; and responsibility for payment of non-covered services.     Patient would like the video invitation sent by:  Send to e-mail at: [email or web address]    Mode of Communication:  Video Conference via Amwell    As the provider I attest to compliance with applicable laws and regulations related to telemedicine.    Service Type: Individual, met briefly with staff     Session Start Time: 8:03 AM Session End Time: 8:21 AM      Session Length: 16-37 minutes     Session #: 23     Attendees: Client attended alone      Focus of Treatment Objective(s):  Client's presenting concerns included: Anxiety - identify symptoms, alleviate symptoms, increase coping strategies  Stage of Change at time of Discharge: ACTION (Actively working towards change)    Medication Adherence:  Yes    Chemical Use:  no use    Assessment: Current Emotional / Mental Status (status of significant symptoms):    Risk status (Self / Other harm or suicidal ideation)  Client denies current fears or concerns for personal safety.  Client denies current or recent suicidal ideation or  behaviors.  Client denies current or recent homicidal ideation or behaviors.  Client denies current or recent self injurious behavior or ideation.  Client denies other safety concerns.  A safety and risk management plan has not been developed at this time, however client was given the after-hours number should there be a change in any of these risk factors.    Appearance:   Appropriate   Eye Contact:   Good   Psychomotor Behavior: Normal   Attitude:   Cooperative  Pleasant  Orientation:   All  Speech   Rate / Production: Normal/ Responsive   Volume:  Normal   Mood:    Stable Anxious  Affect:    Appropriate   Thought Content:  Clear   Thought Form:  Coherent   Insight:   Poor     DSM5 Diagnoses: (Sustained by DSM5 Criteria Listed Above)  Diagnoses: 300.02 (F41.1) Generalized Anxiety Disorder  Psychosocial & Contextual Factors: Lives in a group home    Reason for Discharge:  Referred to in-person individual therapy      Aftercare Plan:  Client may resume counseling services at any time in the future by calling the [facility] Intake Office, [phone].      Arely Whiteside, [facility]  January 28, 2022

## 2022-01-29 ASSESSMENT — ANXIETY QUESTIONNAIRES: GAD7 TOTAL SCORE: 1

## 2022-03-13 ENCOUNTER — HEALTH MAINTENANCE LETTER (OUTPATIENT)
Age: 62
End: 2022-03-13

## 2022-05-05 ENCOUNTER — APPOINTMENT (OUTPATIENT)
Dept: CT IMAGING | Facility: CLINIC | Age: 62
End: 2022-05-05
Attending: EMERGENCY MEDICINE
Payer: MEDICARE

## 2022-05-05 ENCOUNTER — HOSPITAL ENCOUNTER (EMERGENCY)
Facility: CLINIC | Age: 62
Discharge: HOME | End: 2022-05-05
Attending: EMERGENCY MEDICINE | Admitting: EMERGENCY MEDICINE
Payer: MEDICARE

## 2022-05-05 VITALS
OXYGEN SATURATION: 92 % | RESPIRATION RATE: 16 BRPM | SYSTOLIC BLOOD PRESSURE: 134 MMHG | HEART RATE: 88 BPM | DIASTOLIC BLOOD PRESSURE: 80 MMHG

## 2022-05-05 DIAGNOSIS — R56.9 SEIZURE-LIKE ACTIVITY (H): ICD-10-CM

## 2022-05-05 LAB
ALBUMIN SERPL-MCNC: 3.9 G/DL (ref 3.4–5)
ALBUMIN UR-MCNC: 30 MG/DL
ALP SERPL-CCNC: 72 U/L (ref 40–150)
ALT SERPL W P-5'-P-CCNC: 32 U/L (ref 0–50)
ANION GAP SERPL CALCULATED.3IONS-SCNC: 8 MMOL/L (ref 3–14)
APPEARANCE UR: CLEAR
AST SERPL W P-5'-P-CCNC: 22 U/L (ref 0–45)
BASOPHILS # BLD AUTO: 0.1 10E3/UL (ref 0–0.2)
BASOPHILS NFR BLD AUTO: 1 %
BILIRUB SERPL-MCNC: 0.3 MG/DL (ref 0.2–1.3)
BILIRUB UR QL STRIP: NEGATIVE
BUN SERPL-MCNC: 17 MG/DL (ref 7–30)
CALCIUM SERPL-MCNC: 9 MG/DL (ref 8.5–10.1)
CHLORIDE BLD-SCNC: 105 MMOL/L (ref 94–109)
CO2 SERPL-SCNC: 22 MMOL/L (ref 20–32)
COLOR UR AUTO: YELLOW
CREAT SERPL-MCNC: 0.57 MG/DL (ref 0.52–1.04)
EOSINOPHIL # BLD AUTO: 0.4 10E3/UL (ref 0–0.7)
EOSINOPHIL NFR BLD AUTO: 6 %
ERYTHROCYTE [DISTWIDTH] IN BLOOD BY AUTOMATED COUNT: 13 % (ref 10–15)
GFR SERPL CREATININE-BSD FRML MDRD: >90 ML/MIN/1.73M2
GLUCOSE BLD-MCNC: 112 MG/DL (ref 70–99)
GLUCOSE UR STRIP-MCNC: NEGATIVE MG/DL
HCT VFR BLD AUTO: 47.3 % (ref 35–47)
HGB BLD-MCNC: 15.7 G/DL (ref 11.7–15.7)
HGB UR QL STRIP: NEGATIVE
HOLD SPECIMEN: NORMAL
HYALINE CASTS: 22 /LPF
IMM GRANULOCYTES # BLD: 0 10E3/UL
IMM GRANULOCYTES NFR BLD: 0 %
KETONES UR STRIP-MCNC: ABNORMAL MG/DL
LACTATE SERPL-SCNC: 1.5 MMOL/L (ref 0.7–2)
LEUKOCYTE ESTERASE UR QL STRIP: ABNORMAL
LYMPHOCYTES # BLD AUTO: 2 10E3/UL (ref 0.8–5.3)
LYMPHOCYTES NFR BLD AUTO: 30 %
MCH RBC QN AUTO: 28.5 PG (ref 26.5–33)
MCHC RBC AUTO-ENTMCNC: 33.2 G/DL (ref 31.5–36.5)
MCV RBC AUTO: 86 FL (ref 78–100)
MONOCYTES # BLD AUTO: 0.9 10E3/UL (ref 0–1.3)
MONOCYTES NFR BLD AUTO: 13 %
MUCOUS THREADS #/AREA URNS LPF: PRESENT /LPF
NEUTROPHILS # BLD AUTO: 3.4 10E3/UL (ref 1.6–8.3)
NEUTROPHILS NFR BLD AUTO: 50 %
NITRATE UR QL: NEGATIVE
NRBC # BLD AUTO: 0 10E3/UL
NRBC BLD AUTO-RTO: 0 /100
PH UR STRIP: 5 [PH] (ref 5–7)
PLATELET # BLD AUTO: 307 10E3/UL (ref 150–450)
POTASSIUM BLD-SCNC: 3.5 MMOL/L (ref 3.4–5.3)
PROT SERPL-MCNC: 7.4 G/DL (ref 6.8–8.8)
RBC # BLD AUTO: 5.51 10E6/UL (ref 3.8–5.2)
RBC URINE: 2 /HPF
SODIUM SERPL-SCNC: 135 MMOL/L (ref 133–144)
SP GR UR STRIP: 1.03 (ref 1–1.03)
SQUAMOUS EPITHELIAL: <1 /HPF
UROBILINOGEN UR STRIP-MCNC: NORMAL MG/DL
WBC # BLD AUTO: 6.8 10E3/UL (ref 4–11)
WBC URINE: 16 /HPF

## 2022-05-05 PROCEDURE — 85025 COMPLETE CBC W/AUTO DIFF WBC: CPT | Performed by: EMERGENCY MEDICINE

## 2022-05-05 PROCEDURE — 99285 EMERGENCY DEPT VISIT HI MDM: CPT | Mod: 25

## 2022-05-05 PROCEDURE — 250N000013 HC RX MED GY IP 250 OP 250 PS 637: Performed by: EMERGENCY MEDICINE

## 2022-05-05 PROCEDURE — 83605 ASSAY OF LACTIC ACID: CPT | Performed by: EMERGENCY MEDICINE

## 2022-05-05 PROCEDURE — 81001 URINALYSIS AUTO W/SCOPE: CPT | Performed by: EMERGENCY MEDICINE

## 2022-05-05 PROCEDURE — 93005 ELECTROCARDIOGRAM TRACING: CPT

## 2022-05-05 PROCEDURE — 80053 COMPREHEN METABOLIC PANEL: CPT | Performed by: EMERGENCY MEDICINE

## 2022-05-05 PROCEDURE — 87086 URINE CULTURE/COLONY COUNT: CPT | Performed by: EMERGENCY MEDICINE

## 2022-05-05 PROCEDURE — 36415 COLL VENOUS BLD VENIPUNCTURE: CPT | Performed by: EMERGENCY MEDICINE

## 2022-05-05 PROCEDURE — G1004 CDSM NDSC: HCPCS

## 2022-05-05 RX ORDER — CEPHALEXIN 500 MG/1
500 CAPSULE ORAL 2 TIMES DAILY
Qty: 10 CAPSULE | Refills: 0 | Status: SHIPPED | OUTPATIENT
Start: 2022-05-05

## 2022-05-05 RX ORDER — CEPHALEXIN 500 MG/1
500 CAPSULE ORAL ONCE
Status: COMPLETED | OUTPATIENT
Start: 2022-05-05 | End: 2022-05-05

## 2022-05-05 RX ORDER — CEPHALEXIN 500 MG/1
500 CAPSULE ORAL 2 TIMES DAILY
Qty: 10 CAPSULE | Refills: 0 | Status: SHIPPED | OUTPATIENT
Start: 2022-05-05 | End: 2022-05-05

## 2022-05-05 RX ADMIN — CEPHALEXIN 500 MG: 500 CAPSULE ORAL at 19:57

## 2022-05-05 ASSESSMENT — ENCOUNTER SYMPTOMS
CHILLS: 0
SEIZURES: 1
DYSURIA: 0
VOMITING: 1
FEVER: 0
ABDOMINAL PAIN: 1

## 2022-05-05 NOTE — ED NOTES
Bed: ED23  Expected date: 5/5/22  Expected time: 4:57 PM  Means of arrival: Ambulance  Comments:  BV 2 61 F seizure

## 2022-05-05 NOTE — ED TRIAGE NOTES
"Pt presents from group home after being found down when staff walked away from the deck the pt was sitting on. Pt was found non responsive and with her eyes shaking. She was not arousalable for her staff. Pt appeared post ictal for EMS> Has tongue bite on right hand side. Alert and oriented upon arrival. Pt not on blood thinners. Hx of seizures a \"long time\" ago.  staff at bedside     Triage Assessment     Row Name 05/05/22 [id number]       Triage Assessment (Adult)    Airway WDL WDL       Cardiac WDL    Cardiac WDL WDL       Cognitive/Neuro/Behavioral WDL    Cognitive/Neuro/Behavioral WDL WDL              "

## 2022-05-06 LAB
ATRIAL RATE - MUSE: 76 BPM
DIASTOLIC BLOOD PRESSURE - MUSE: NORMAL MMHG
INTERPRETATION ECG - MUSE: NORMAL
P AXIS - MUSE: 24 DEGREES
PR INTERVAL - MUSE: 180 MS
QRS DURATION - MUSE: 84 MS
QT - MUSE: 398 MS
QTC - MUSE: 447 MS
R AXIS - MUSE: 37 DEGREES
SYSTOLIC BLOOD PRESSURE - MUSE: NORMAL MMHG
T AXIS - MUSE: 47 DEGREES
VENTRICULAR RATE- MUSE: 76 BPM

## 2022-05-06 NOTE — RESULT ENCOUNTER NOTE
[facility] Emergency Dept discharge antibiotic (if prescribed): Cephalexin (Keflex) 500 mg capsule, 1 capsule (500 mg) by mouth 2 times daily for 5 days.   Date of Rx (if applicable):  5/5/22  No changes in treatment per [facility] ED Lab Result Urine culture protocol.

## 2022-05-06 NOTE — DISCHARGE INSTRUCTIONS
Discharge Instructions  Recurrent Seizure (Convulsion)    You were seen today for a seizure. The most common reason for a recurrent seizure is having missed a dose of your medication or taken it at a different time than normal. Other things that increase the risk of seizures include fever, sleep deprivation, alcohol, and stress. Although anti-seizure medications (anti-epileptic drugs) work for many people with seizure disorders, some people continue to have seizures even after trying several medications.    Generally, every Emergency Department visit should have a follow-up clinic visit with either a primary or a specialty clinic/provider. Please follow-up as instructed by your emergency provider today.    Return to the Emergency Department if:   You develop a fever over 100.4 F.  You feel much more ill, or develop new symptoms like severe headache.  You have trouble walking, seeing, or develop weakness or numbness in your arms or legs.     What can I do to help myself?  Take your medication exactly as directed, at the right times, and the right doses.   If you develop uncomfortable side effects, do not stop taking your anti-seizure medication without first speaking to your provider.   Do not let your prescription run out. Stopping anti-seizure medication abruptly can put you at risk of a seizure.  While taking an anti-seizure medication, do not start taking any other medications including over-the-counter medications and herbal supplements without first checking with your provider because mixing them can be dangerous.  Do not drive until you have been rechecked by your provider and have been told it is safe to drive.  If you have a seizure while driving you may cause a motor vehicle accident with injury or death to yourself or others.   Do not swim, climb ladders, or do anything else that would be dangerous if you had another seizure or spell of loss of consciousness, until you are cleared by your provider.     Check your state driving requirements for patients with seizures on the Epilepsy Foundation Website at www.epilepsyfoundation.org/resources/drivingandtravel.cfm.  Do not drink alcohol.  Drinking alcohol increases the risk of seizures and can interfere with the effect of anti-seizure medications.  Start a seizure calendar to record any seizure triggers, such as days when you were sleep-deprived, stressed, drank alcohol, or (if you are a woman) had your period.  Remember, if one medication does not work for you, either because you cannot tolerate the side effects or because you continue to have seizures, your provider can suggest alternate medications or alternate methods of taking the medication.  If you were given a prescription for medicine here today, be sure to read all of the information (including the package insert) that comes with your prescription.  This will include important information about the medicine, its side effects, and any warnings that you need to know about.  The pharmacist who fills the prescription can provide more information and answer questions you may have about the medicine.  If you have questions or concerns that the pharmacist cannot address, please call or return to the Emergency Department.   Remember that you can always come back to the Emergency Department if you are not able to see your regular provider in the amount of time listed above, if you get any new symptoms, or if there is anything that worries you.    Discharge Instructions  Urinary Tract Infection  You or your child have been diagnosed with a urinary tract infection, or UTI. The urinary tract includes the kidneys (which make urine/pee), ureters (the tubes that carry urine/pee from the kidneys to the bladder), the bladder (which stores urine/pee), and urethra (the tube that carries urine/pee out of the bladder). Urinary tract infections occur when bacteria travel up the urethra into the bladder (bladder infection) and,  in some cases, from there into the kidneys (kidney infection).  Generally, every Emergency Department visit should have a follow-up clinic visit with either a primary or a specialty clinic/provider. Please follow-up as instructed by your emergency provider today.  Return to the Emergency Department if:  You or your child have severe back pain.  You or your child are vomiting (throwing up) so that you cannot take your medicine.  You or your child have a new fever (had not previously had a fever) over 101 F.  You or your child have confusion or are very weak, or feel very ill.  Your child seems much more ill, will not wake up, will not respond right, or is crying for a long time and will not calm down.  You or your child are showing signs of dehydration. These signs may include decreased urination (pee), dry mouth/gums/tongue, or decreased activity.    Follow-up with your provider:   Children under 24 months need to be seen by their regular provider within one week after a diagnosis of a UTI. It may be necessary to do some more tests to look at the child s kidney or bladder.  You should begin to feel better within 24 - 48 hours of starting your antibiotic; follow-up with your regular clinic/doctor/provider if this is not the case.    Treatment:   You will be treated with an antibiotic to kill the bacteria. We have to make an educated guess, based on what we know about common bacteria and antibiotics, as to which antibiotic will work for your infection. We will be correct most times but there will be some cases where the antibiotic chosen is not correct (see urine cultures below).  Take a pain medication such as acetaminophen (Tylenol ) or ibuprofen (Advil , Motrin , Nuprin ).  Phenazopyridine (Pyridium , Uristat ) is a prescription medication that numbs the bladder to reduce the burning pain of some UTIs.  The same medication is available in a non-prescription version (Azo-Standard , Urodol ). This medication will  "change the color of the urine and tears (usually blue or orange). If you wear contacts, do not wear them while taking this medication as they may be stained by the medication.    Urine Cultures:  If indicated, a urine culture may have been performed today. This test generally takes 24-48 hours to complete so the results are not known at this time. The results can confirm that an infection is present but also determine which antibiotic is effective for the specific bacteria that is causing the infection. If your urine culture shows that the antibiotic you were given today will not work to treat your infection, we will attempt to contact you to make arrangements to change the antibiotic. If the culture confirms that the antibiotic is effective for your infection, you will not be contacted. We often recommend follow-up with your regular physician/provider on the culture results regardless of this process.    Antibiotic Warning:   If you have been placed on antibiotics - watch for signs of allergic reaction.  These include rash, lip swelling, difficulty breathing, wheezing, and dizziness.  If you develop any of these symptoms, stop the antibiotic immediately and go to an emergency room or urgent care for evaluation.    Probiotics: If you have been given an antibiotic, you may want to also take a probiotic pill or eat yogurt with live cultures. Probiotics have \"good bacteria\" to help your intestines stay healthy. Studies have shown that probiotics help prevent diarrhea and other intestine problems (including C. diff infection) when you take antibiotics. You can buy these without a prescription in the pharmacy section of the store.   If you were given a prescription for medicine here today, be sure to read all of the information (including the package insert) that comes with your prescription.  This will include important information about the medicine, its side effects, and any warnings that you need to know about.  The " pharmacist who fills the prescription can provide more information and answer questions you may have about the medicine.  If you have questions or concerns that the pharmacist cannot address, please call or return to the Emergency Department.   Remember that you can always come back to the Emergency Department if you are not able to see your regular provider in the amount of time listed above, if you get any new symptoms, or if there is anything that worries you.

## 2022-05-07 LAB — BACTERIA UR CULT: NORMAL

## 2022-07-10 ENCOUNTER — HOSPITAL ENCOUNTER (EMERGENCY)
Facility: CLINIC | Age: 62
Discharge: HOME | End: 2022-07-10
Attending: EMERGENCY MEDICINE | Admitting: EMERGENCY MEDICINE
Payer: MEDICARE

## 2022-07-10 VITALS
HEART RATE: 77 BPM | OXYGEN SATURATION: 96 % | SYSTOLIC BLOOD PRESSURE: 135 MMHG | BODY MASS INDEX: 30.17 KG/M2 | WEIGHT: 151.9 LBS | TEMPERATURE: 97.9 F | DIASTOLIC BLOOD PRESSURE: 72 MMHG | RESPIRATION RATE: 22 BRPM

## 2022-07-10 DIAGNOSIS — R56.9 SEIZURE-LIKE ACTIVITY (H): ICD-10-CM

## 2022-07-10 LAB
ANION GAP SERPL CALCULATED.3IONS-SCNC: 13 MMOL/L (ref 3–14)
BASOPHILS # BLD AUTO: 0 10E3/UL (ref 0–0.2)
BASOPHILS NFR BLD AUTO: 1 %
BUN SERPL-MCNC: 12 MG/DL (ref 7–30)
CALCIUM SERPL-MCNC: 9 MG/DL (ref 8.5–10.1)
CHLORIDE BLD-SCNC: 106 MMOL/L (ref 94–109)
CO2 SERPL-SCNC: 21 MMOL/L (ref 20–32)
CREAT SERPL-MCNC: 0.47 MG/DL (ref 0.52–1.04)
EOSINOPHIL # BLD AUTO: 0.3 10E3/UL (ref 0–0.7)
EOSINOPHIL NFR BLD AUTO: 4 %
ERYTHROCYTE [DISTWIDTH] IN BLOOD BY AUTOMATED COUNT: 13.2 % (ref 10–15)
GFR SERPL CREATININE-BSD FRML MDRD: >90 ML/MIN/1.73M2
GLUCOSE BLD-MCNC: 141 MG/DL (ref 70–99)
HCT VFR BLD AUTO: 46.3 % (ref 35–47)
HGB BLD-MCNC: 15.3 G/DL (ref 11.7–15.7)
IMM GRANULOCYTES # BLD: 0 10E3/UL
IMM GRANULOCYTES NFR BLD: 1 %
LYMPHOCYTES # BLD AUTO: 1.7 10E3/UL (ref 0.8–5.3)
LYMPHOCYTES NFR BLD AUTO: 26 %
MCH RBC QN AUTO: 28.6 PG (ref 26.5–33)
MCHC RBC AUTO-ENTMCNC: 33 G/DL (ref 31.5–36.5)
MCV RBC AUTO: 87 FL (ref 78–100)
MONOCYTES # BLD AUTO: 0.7 10E3/UL (ref 0–1.3)
MONOCYTES NFR BLD AUTO: 11 %
NEUTROPHILS # BLD AUTO: 3.9 10E3/UL (ref 1.6–8.3)
NEUTROPHILS NFR BLD AUTO: 57 %
NRBC # BLD AUTO: 0 10E3/UL
NRBC BLD AUTO-RTO: 0 /100
PLATELET # BLD AUTO: 305 10E3/UL (ref 150–450)
POTASSIUM BLD-SCNC: 3.6 MMOL/L (ref 3.4–5.3)
RBC # BLD AUTO: 5.35 10E6/UL (ref 3.8–5.2)
SODIUM SERPL-SCNC: 140 MMOL/L (ref 133–144)
WBC # BLD AUTO: 6.7 10E3/UL (ref 4–11)

## 2022-07-10 PROCEDURE — 80048 BASIC METABOLIC PNL TOTAL CA: CPT

## 2022-07-10 PROCEDURE — 96365 THER/PROPH/DIAG IV INF INIT: CPT

## 2022-07-10 PROCEDURE — 250N000011 HC RX IP 250 OP 636: Performed by: EMERGENCY MEDICINE

## 2022-07-10 PROCEDURE — C9803 HOPD COVID-19 SPEC COLLECT: HCPCS

## 2022-07-10 PROCEDURE — 99284 EMERGENCY DEPT VISIT MOD MDM: CPT | Mod: 25,CS

## 2022-07-10 PROCEDURE — 85025 COMPLETE CBC W/AUTO DIFF WBC: CPT

## 2022-07-10 PROCEDURE — 36415 COLL VENOUS BLD VENIPUNCTURE: CPT

## 2022-07-10 RX ORDER — LEVETIRACETAM 15 MG/ML
1500 INJECTION INTRAVASCULAR ONCE
Status: COMPLETED | OUTPATIENT
Start: 2022-07-10 | End: 2022-07-10

## 2022-07-10 RX ORDER — LEVETIRACETAM 500 MG/1
500 TABLET ORAL 2 TIMES DAILY
Qty: 14 TABLET | Refills: 0 | Status: SHIPPED | OUTPATIENT
Start: 2022-07-10 | End: 2022-07-17

## 2022-07-10 RX ADMIN — LEVETIRACETAM 1500 MG: 15 INJECTION INTRAVENOUS at 16:37

## 2022-07-10 ASSESSMENT — ENCOUNTER SYMPTOMS
SHORTNESS OF BREATH: 0
FEVER: 0
SEIZURES: 1
ABDOMINAL PAIN: 0
COUGH: 0

## 2022-07-10 NOTE — ED TRIAGE NOTES
Patient arrives via EMS. Per report, patient was at [employer] today and had a seizure. Sister present at the time, emesis x1. No fall and denies hitting her head. Has a hx of seizures and dementia. VSS in route. Blood sugar 127     Triage Assessment     Row Name 07/10/22 1500       Triage Assessment (Adult)    Airway WDL WDL       Respiratory WDL    Respiratory WDL WDL       Skin Circulation/Temperature WDL    Skin Circulation/Temperature WDL WDL       Cardiac WDL    Cardiac WDL WDL       Peripheral/Neurovascular WDL    Peripheral Neurovascular WDL WDL       Cognitive/Neuro/Behavioral WDL    Cognitive/Neuro/Behavioral WDL WDL

## 2022-07-10 NOTE — DISCHARGE INSTRUCTIONS
You were seen in the emergency department today for seizure-like activity that was different in nature from previous seizure-like episodes.  You were provided with intravenous Keppra to help prevent seizures and you will be given a prescription for 5 days.  Please call and set up an appointment for follow-up with your neurologist, Dr. Castellanos, for recheck.  In the meantime, please return to the emergency department if you develop any new or worsening symptoms.

## 2022-07-10 NOTE — ED PROVIDER NOTES
"Emergency Department Attending Supervision Note        I evaluated this patient with [place] [facility].       Briefly, the patient presented with seizure-like activity in the context of history of nonepileptic form seizures.  Her exam shows nonfocal neurologic exam.  Based on the history of nonepileptic seizures, and previous work-up, brain imaging is not indicated.  Screening labs showed no concerning findings.  We had a long shared decision-making conversation with the patient, her sister, and the group home staff member.  Based on this event being highly atypical of previous and possibly involving a postictal phase, this could represent epileptiform seizure event.  Furthermore, they would like to initiate anticonvulsant therapy.  Patient will be loaded with Keppra here and started on Keppra 500 mg twice daily for, with plan for neurology follow-up for repeat EEG as scheduled should new seizure-like activity develop.  Patient is safe for discharge at this time with the above follow-up, and return precautions.      In summary, my impression is     Visit Diagnosis, Associated Orders, and Comments     ICD-10-CM    1. Seizure-like activity (H)  R56.9        Hank Ureña MD  [facility]  [facility] Emergency Department        History   Chief Complaint:  Seizures       The history is provided by the patient, a relative and a caregiver.      Valentine Vu is a 61 year old female with history of dementia, hypertension, syncope, nonepileptic seizures, generalized anxiety, ADHD, who presents following seizure-like activity via EMS. She was at [employer] shopping with her sister when she began to get a \"vacant\" look and she stopped walking.  She groaned and then began convulsing at 1410. Sister stabilized her on the ground.  Patient did not fall or hit her head.  No tongue laceration and no urinary continence . Sister reports that the patient tensed up, shaking her hands and bobbing her head.  By the time EMS appeared, the " patient had stopped shaking.  According to EMS, she was confused and postictal for a bit.  She was not provided any medication in route.  Patient denies any abdominal pain, nausea, vomiting, urinary symptoms, fever, cough, cold symptoms, shortness of breath. Most recent seizure-like activity was May 13th. Patient was seen by neurology in March of 2022 and was diagnosed with non-epileptic seizures, and Keppra was discontinued at that time.  According to her sister and , her seizure-like activity is usually less than 2 minutes and seems more like absence seizures.  Of note , patient discontinued citalopram and began taking Zoloft June 2022.     Review of Systems   Constitutional: Negative for fever.   Respiratory: Negative for cough and shortness of breath.    Gastrointestinal: Negative for abdominal pain.   Neurological: Positive for seizures.   All other systems reviewed and are negative.    Allergies:  The patient has no known allergies.     Medications:  Keppra  Zoloft  Zofran  Flonase  Zyrtec     Past Medical History:     Dementia   Hypertension  Syncope  Generalized anxiety  ADHD  Non-epileptic seizures  Major depression    Past Surgical History:    Colonoscopy  Tonsillectomy  Knee replacement  Proctoscopy    Family History:    Brother: Leukemia    Social History:  Lives in group home, was brought in via EMS from [facility]. Joined by sister and later joined by .    Physical Exam     Patient Vitals for the past 24 hrs:   BP Temp Temp src Pulse Resp SpO2 Weight   07/10/22 1715 135/72 97.9  F (36.6  C) Oral 77 -- 96 % --   07/10/22 1627 -- -- -- -- -- -- 68.9 kg (151 lb 14.4 oz)   07/10/22 1530 126/71 -- -- 81 -- 96 % --   07/10/22 1458 134/82 97.1  F (36.2  C) Oral 88 22 95 % --       Physical Exam  General: Tired appearing female laying on \[facility]\"" with sister at bedside.  HENT: Head appears atraumatic.  No scalp hematomas.  No bony step-offs.  No ecchymosis.  Patient wearing  face mask. When taken off, mucous membranes appear moist.  No tongue laceration.  No loose dentition.  Eyes: Conjunctive and sclera clear.  PERRLA.  Extraocular movements intact.  CV: Regular rate and rhythm. Normal S1, S2. No appreciable murmurs, gallops or rubs.  Resp: Lungs clear to auscultation bilaterally. Normal respiratory effort. Speaks in full sentences. No stridor or cough observed.  GI: Abdomen soft, non distended and nontender. No rebound or guarding.  MSK: Moves all extremities without difficulty. No lower extremity edema.  Skin: Warm and dry.  No ecchymosis or signs of trauma.  Neuro: Awake, alert, oriented x3. Cranial nerves II-XII intact.  Psych: Cooperative.       Emergency Department Course   ECG  ECG results from 05/05/22   EKG 12-lead, tracing only     Value    Systolic Blood Pressure     Diastolic Blood Pressure     Ventricular Rate 76    Atrial Rate 76    NV Interval 180    QRS Duration 84        QTc 447    P Axis 24    R AXIS 37    T Axis 47    Interpretation ECG      Sinus rhythm  Normal ECG  When compared with ECG of 21-APR-2019 08:52,  No significant change was found  Confirmed by - EMERGENCY ROOM, PHYSICIAN ([id number]),  LILLIAM MUNOZ ([id number]) on 5/6/2022 7:13:41 AM       Laboratory:  Labs Ordered and Resulted from Time of ED Arrival to Time of ED Departure   BASIC METABOLIC PANEL - Abnormal       Result Value    Sodium 140      Potassium 3.6      Chloride 106      Carbon Dioxide (CO2) 21      Anion Gap 13      Urea Nitrogen 12      Creatinine 0.47 (*)     Calcium 9.0      Glucose 141 (*)     GFR Estimate >90     CBC WITH PLATELETS AND DIFFERENTIAL - Abnormal    WBC Count 6.7      RBC Count 5.35 (*)     Hemoglobin 15.3      Hematocrit 46.3      MCV 87      MCH 28.6      MCHC 33.0      RDW 13.2      Platelet Count 305      % Neutrophils 57      % Lymphocytes 26      % Monocytes 11      % Eosinophils 4      % Basophils 1      % Immature Granulocytes 1      NRBCs per 100 WBC 0       Absolute Neutrophils 3.9      Absolute Lymphocytes 1.7      Absolute Monocytes 0.7      Absolute Eosinophils 0.3      Absolute Basophils 0.0      Absolute Immature Granulocytes 0.0      Absolute NRBCs 0.0          Emergency Department Course:  Reviewed:  I reviewed nursing notes, vitals, past medical history and Care Everywhere    Assessments:  1458 I obtained history and examined the patient as noted above.    1650   I rechecked the patient and explained findings.    1719 I rechecked the patient and explained findings.    Interventions:  Medications   levETIRAcetam (KEPPRA) intermittent infusion 1,500 mg (0 mg Intravenous Stopped 7/10/22 1710)     Disposition:  The patient was discharged to home.     Impression & Plan     Medical Decision Making:  Valentine Vu is a 61 year old female with history of Dementia, hypertension, syncope, nonepileptic seizures, generalized anxiety, ADHD, who presented by EMS for seizure like activity per HPI above.  According to her sister at bedside, she did have a period of confusion afterwards which resolved by the time I evaluated the patient.  She had normal vital signs and a normal physical exam per above.  The patient had been on Keppra and was taken off by neurology in March, as she was thought to have nonepileptic seizures.  Apparently, she had negative EEG testing back in 2019.  She started with a new neurologist this spring, Dr. Castellanos, who agreed with holding off on any Keppra.  However he had indicated to the patient and group home staff that if she developed new symptoms she should undergo EEG testing and perhaps be placed back on Keppra.  Seizure-like activity today was different in nature to her typical seizure-like activity and the patient will need to follow-up closely with Dr. Castellanos for a follow-up appointment.  In the meantime she was prescribed 1 week of Keppra to take twice daily for seizure prophylaxis.  Her sister and  understand that they can  return here to the emergency department if she develops any new or worsening symptoms.  The patient appeared well and had normal vital signs at the time of discharge.    Diagnosis:    ICD-10-CM    1. Seizure-like activity (H)  R56.9        Discharge Medications:  New Prescriptions    LEVETIRACETAM (KEPPRA) 500 MG TABLET    Take 1 tablet (500 mg) by mouth 2 times daily for 7 days       Scribe Disclosure:  I, Khadijah Belcher, am serving as a scribe at 2:58 PM on 7/10/2022 to document services personally performed by Lindsay Nichols PA-C  based on my observations and the provider's statements to me.     Lindsay Nichols PA-C  [facility] APC-T  I saw and evaluated this patient under attending provider Dr. Ureña.           Lindsay Nichols PA-C  07/10/22 [id number]       Hank Ureña MD  07/11/22 0016

## 2022-08-24 ENCOUNTER — TRANSFERRED RECORDS (OUTPATIENT)
Dept: HEALTH INFORMATION MANAGEMENT | Facility: CLINIC | Age: 62
End: 2022-08-24

## 2023-01-14 ENCOUNTER — HEALTH MAINTENANCE LETTER (OUTPATIENT)
Age: 63
End: 2023-01-14

## 2023-04-23 ENCOUNTER — HEALTH MAINTENANCE LETTER (OUTPATIENT)
Age: 63
End: 2023-04-23

## 2023-05-01 NOTE — PROGRESS NOTES
Progress Note    Patient Name: Valentine Vu  Date: 11/9/2020         Service Type: Individual      Session Start Time: 11:02  Session End Time: 11:32     Session Length: 30    Session #: 12    Attendees: Client and  presented for first half of meeting    Service Modality:  Video Visit:    Telemedicine Visit: The patient's condition can be safely assessed and treated via synchronous audio and visual telemedicine encounter.      Reason for Telemedicine Visit: Services only offered telehealth    Originating Site (Patient Location): Patient's home    Distant Site (Provider Location): Provider Remote Setting    Consent:  The patient/guardian has verbally consented to: the potential risks and benefits of telemedicine (video visit) versus in person care; bill my insurance or make self-payment for services provided; and responsibility for payment of non-covered services.     Patient would like the video invitation sent by: Text to cell phone: [phone]}     Mode of Communication:  Video Conference via [employer]    As the provider I attest to compliance with applicable laws and regulations related to telemedicine.     Treatment Plan Last Reviewed: 9/9/2020  PHQ-9 / ORESTES-7 : 7 - 11/9/2020     DATA  Interactive Complexity: No  Crisis: No       Progress Since Last Session (Related to Symptoms / Goals / Homework):   Symptoms: Patient began talking to writer more about how she has been doing and what has been bothering her    Homework: Achieved / completed to satisfaction      Episode of Care Goals: Satisfactory progress - MAINTENANCE (Working to maintain change, with risk of relapse); Intervened by continuing to positively reinforce healthy behavior choice      Current / Ongoing Stressors and Concerns:   Patient reports being anxious about the Corona Virus and that this is causing her to pace.       Treatment Objective(s) Addressed in This Session:   use distraction  each time intrusive worry surfaces       Intervention:   Solution Focused: Discussed with patient things that she does to entertain herself that she enjoys.  Patient reported Dez movies and puzzles on his IPad.  She reports that when she does her puzzle that she is able to relax.  She decided that she would try doing her puzzle when she would normally pace due to increased worry.        ASSESSMENT: Current Emotional / Mental Status (status of significant symptoms):   Risk status (Self / Other harm or suicidal ideation)   Patient denies current fears or concerns for personal safety.   Patient denies current or recent suicidal ideation or behaviors.   Patient denies current or recent homicidal ideation or behaviors.   Patient denies current or recent self injurious behavior or ideation.   Patient denies other safety concerns.   Patient reports there has been no change in risk factors since their last session.     Patient reports there has been no change in protective factors since their last session.     Recommended that patient call 911 or go to the local ED should there be a change in any of these risk factors.     Appearance:   Appropriate    Eye Contact:   Fair    Psychomotor Behavior: Restless    Attitude:   Cooperative  Friendly Pleasant   Orientation:   All   Speech    Rate / Production: Pressured  Normal     Volume:  Normal    Mood:    Euthymic   Affect:    Appropriate    Thought Content:  Clear    Thought Form:  Coherent  Commerce   Insight:    Fair      Medication Review:   No changes to current psychiatric medication(s)     Medication Compliance:   Yes     Changes in Health Issues:   None reported     Chemical Use Review:   Substance Use: Chemical use reviewed, no active concerns identified      Tobacco Use: No current tobacco use.      Diagnosis:  1. ORESTES (generalized anxiety disorder)        Collateral Reports Completed:   Communicated with: YAHAIRA Olson  Not Applicable    PLAN: (Patient  Tasks / Therapist Tasks / Other)  Patient will do her puzzle when she notices that she is starting to worry during the day  Patient will transition back to former therapist        YAHAIRA Sim                                                         ______________________________________________________________________    Treatment Plan     Patient's Name: Valentine Vu                        YOB: 1960     Date: 9/9/2020     DSM5 Diagnoses: 300.02 (F41.1) Generalized Anxiety Disorder  Psychosocial / Contextual Factors: lives in a group home, increase in anxiety with COVID and having less social contact  WHODAS: 12     Referral / Collaboration:  Referral to another professional/service is not indicated at this time..     Anticipated number of session or this episode of care: 12        MeasurableTreatment Goal(s) related to diagnosis / functional impairment(s)  Goal 1: Patient's anxiety will remit as evidenced by a decrease in GAD7 score by at least 6 points or below a 5, where symptoms occur fewer than half the days for a minimum of 4 weeks.  Client's Goal:  I will know I've met my goal when I am able to relax.       Objective #A (Patient Action)                          Patient will use at least 3 coping skills for anxiety management in the next 8 weeks.  Status: Completed 9/9/2020     Intervention(s)  Therapist will teach coping strategies such as grounding techniques and cognitive restructuring to manage anxiety symptoms.     Objective #B  Patient will identify at least 3 triggers for anxiety.  Status: Completed 9/9/2020      Intervention(s)  Therapist will engage client in discussion to identify cognitions and situations that precede anxiety symptoms.     Objective #C  Patient will use relaxation strategies 1 times per day to reduce the physical symptoms of anxiety.  Status: Continued - Date:9/9/2020       Intervention(s)  Therapist will teach relaxation strategies and address any  barriers in engaging in the strategies..        Patient has reviewed and agreed to the above plan.        Paulina Ridley, YAHAIRA                     9/9/2020   No

## 2024-02-27 ENCOUNTER — HOSPITAL ENCOUNTER (EMERGENCY)
Facility: CLINIC | Age: 64
Discharge: HOME | End: 2024-02-27
Attending: EMERGENCY MEDICINE | Admitting: EMERGENCY MEDICINE
Payer: MEDICARE

## 2024-02-27 ENCOUNTER — APPOINTMENT (OUTPATIENT)
Dept: CT IMAGING | Facility: CLINIC | Age: 64
End: 2024-02-27
Attending: EMERGENCY MEDICINE
Payer: MEDICARE

## 2024-02-27 VITALS
RESPIRATION RATE: 18 BRPM | DIASTOLIC BLOOD PRESSURE: 75 MMHG | HEART RATE: 89 BPM | TEMPERATURE: 97.4 F | SYSTOLIC BLOOD PRESSURE: 147 MMHG | OXYGEN SATURATION: 96 %

## 2024-02-27 DIAGNOSIS — H10.32 ACUTE CONJUNCTIVITIS OF LEFT EYE, UNSPECIFIED ACUTE CONJUNCTIVITIS TYPE: ICD-10-CM
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
## 2024-02-27 DIAGNOSIS — Z87.898 HISTORY OF PSYCHOGENIC NONEPILEPTIC SEIZURE: ICD-10-CM

## 2024-02-27 PROCEDURE — 70450 CT HEAD/BRAIN W/O DYE: CPT | Mod: MG

## 2024-02-27 PROCEDURE — 99284 EMERGENCY DEPT VISIT MOD MDM: CPT | Mod: 25

## 2024-02-27 RX ORDER — POLYMYXIN B SULFATE AND TRIMETHOPRIM 1; 10000 MG/ML; [USP'U]/ML
1-2 SOLUTION OPHTHALMIC EVERY 4 HOURS
Qty: 10 ML | Refills: 0 | Status: SHIPPED | OUTPATIENT
Start: 2024-02-27 | End: 2024-03-03

## 2024-02-27 ASSESSMENT — ACTIVITIES OF DAILY LIVING (ADL)
ADLS_ACUITY_SCORE: 34
ADLS_ACUITY_SCORE: 36

## 2024-02-27 ASSESSMENT — COLUMBIA-SUICIDE SEVERITY RATING SCALE - C-SSRS
2. HAVE YOU ACTUALLY HAD ANY THOUGHTS OF KILLING YOURSELF IN THE PAST MONTH?: NO
6. HAVE YOU EVER DONE ANYTHING, STARTED TO DO ANYTHING, OR PREPARED TO DO ANYTHING TO END YOUR LIFE?: NO
1. IN THE PAST MONTH, HAVE YOU WISHED YOU WERE DEAD OR WISHED YOU COULD GO TO SLEEP AND NOT WAKE UP?: NO

## 2024-02-27 NOTE — ED TRIAGE NOTES
"Pt presents with concern for fall and probably seizure. Pt was on stairs and had partially witness fall down 1/2 flight stairs. Hx nonepileptic seizure. Group home staff report pt is close to baseline but seems still \"out of it\" from episode. L eye swelling. Pt denies pain. Reports taking meds and eating breakfast as normal.      Triage Assessment (Adult)       Row Name 02/27/24 0819          Triage Assessment    Airway WDL WDL        Respiratory WDL    Respiratory WDL WDL        Skin Circulation/Temperature WDL    Skin Circulation/Temperature WDL WDL        Cardiac WDL    Cardiac WDL WDL        Peripheral/Neurovascular WDL    Peripheral Neurovascular WDL WDL        Cognitive/Neuro/Behavioral WDL    Cognitive/Neuro/Behavioral WDL X  slow to respond                     "

## 2024-02-27 NOTE — ED PROVIDER NOTES
History     Chief Complaint:  Fall and Seizures       HPI   Valentine Vu is a 63 year old female with history of nonepileptic seizures, dementia, and hypertension who presents with group home staff for evaluation of a fall after a possible nonepileptic seizure around 0745. The group home staff member reports the patient being nervous for a dentist appointment and went up half a flight of stairs when they heard her fall. Nobody witnessed the fall. She states the patient possibly hit her head. She states the patient was confused and couldn't answer questions correctly after fall but was ambulatory. She notes the patient never falling down a flight of stairs before saying during episodes of seizures in the past, the staff has always caught her before falling. During evaluation the patient states remembering the fall and was able to recall what she had for breakfast. The patient also has an irritated and red left eye. Denies pain and headache.     Independent Historian:    Group home staff reports as noted above    Review of External Notes:  Reviewed outpt neurology notes from Dr. Castellanos.       Medications:    Keppra  Namenda  Aricept  Zyrtec  Zoloft  Melatonin    Past Medical History:    Sleep apnea  Nonepileptic seizure  Anxiety  Hypertension  Depression  Dementia  Hearing loss  Polyp of colon    Past Surgical History:    Total knee replacement  Tonsillectomy  Proctoscopy  Colonoscopy       Physical Exam   Patient Vitals for the past 24 hrs:   BP Temp Temp src Pulse Resp SpO2   02/27/24 0817 (!) 152/85 97.4  F (36.3  C) Temporal 101 18 98 %        Physical Exam  Nursing note and vitals reviewed.  HENT:   Mouth/Throat: Moist mucous membranes.   Eyes: EOMI, nonicteric sclera. Left conjunctiva injected with matting on eyelashes. Pupil reacts normally. No APD.  Cardiovascular: Normal rate, regular rhythm, no murmurs, rubs, or gallops  Pulmonary/Chest: Effort normal and breath sounds normal. No respiratory distress.  No wheezes. No rales.   Abdominal: Soft. Nontender, nondistended, no guarding or rigidity.   Musculoskeletal: Normal/painless range of motion of bilateral shoulders, elbows, wrists, hips, knees, ankles. No midline C-spine tenderness.   Neurological: Alert. Moves all extremities spontaneously. Face symmetric. Speech fluent. Oriented.   Skin: Skin is warm and dry. No rash noted.         Emergency Department Course       Imaging:  Head CT w/o contrast   Final Result   IMPRESSION:   No intracranial hemorrhage, mass, or definite CT evidence of recent   ischemia.      BHAVESH JACKSON MD            SYSTEM ID:  [id number]          Emergency Department Course & Assessments:    Interventions:  Medications - No data to display     Assessments:  0912 I obtained history and performed physical exam as noted above.  1019 I rechecked and updated the patient in regards to discharge.     Independent Interpretation (X-rays, CTs, rhythm strip):  I independently reviewed the CT. I see no evidence of intracranial hemorrhage.      Consultations/Discussion of Management or Tests:  None       Social Determinants of Health affecting care:  Education/Literacy     Disposition:  The patient was discharged.    Impression & Plan        Medical Decision Making:  Pt presents after suspected fall down stairs. She has no complaints. Group home staff concerned for nonepileptic seizure. Pt recalls the fall itself, therefore epileptic seizure quite unlikely. Pt has no complaints of pain. Her exam is normal. Group home staff and pt's guardian requesting head CT. At pt's age, she may have increased risk of intracranial hemorrhage. With dementia, CT indicated. This returns negative. Reassured pt/staff. Pt incidentally noted to have conjunctivitis of her left eye we'll treat with polytrim. She is in stable condition at the time of discharge, red flags that should merit ED return were discussed as well as recommended follow-up instructions. All questions  were answered and pt/staff are in agreement with the plan.          Diagnosis:    ICD-10-CM    1. Fall down stairs, initial encounter  W10.8XXA       2. Acute conjunctivitis of left eye, unspecified acute conjunctivitis type  H10.32       3. History of psychogenic nonepileptic seizure  Z87.898            Discharge Medications:  New Prescriptions    POLYMIXIN B-TRIMETHOPRIM (POLYTRIM) 94746-8.1 UNIT/ML-% OPHTHALMIC SOLUTION    Place 1-2 drops Into the left eye every 4 hours for 5 days          Scribe Disclosure:  Viridiana AGUSTIN, am serving as a scribe at 10:19 AM on 2/27/2024 to document services personally performed by Bhupendra Jefferson MD based on my observations and the provider's statements to me.               Bhupendra Jefferson MD  02/29/24 2124

## 2024-09-30 NOTE — TELEPHONE ENCOUNTER
Ashwini Saavedra is a 28 year old female.  Chief Complaint   Patient presents with    Physical     Here for annual physical and pap    Behavioral Problem     Pt would like to discuss anxiety . Pt had a therapist from [id number]. Pt has a telehealth visit today with new insurance.     HPI:   Ashwini Saavedra presented to the clinic for annual physical examination. New patient. No changes in family/personal history. Normal Sleep. Normal appetite. Balanced diet. Working with dietician to improve diet. Normal BM/Urination. Physically active. limited.  Sexually active. One partner. LMP 9/26/24. No daily medications.  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      Additional concern related to anxiety and depression. Previously followed with therapist [id number]. Feels symptoms un-managed. Previously tried lexapro 2018, discontinued due to lethargy. Unsure what dose.  Has appointment with therapist today, telehealth.         No current outpatient medications on file.      Past Medical History:    Allergic rhinitis    Depression    treated in     High cholesterol      History reviewed. No pertinent surgical history.   Social History:  Social History     Socioeconomic History    Marital status: Single   Tobacco Use    Smoking status: Never    Smokeless tobacco: Never   Substance and Sexual Activity    Alcohol use: Yes     Comment: social     Drug use: No    Sexual activity: Yes      Family History   Problem Relation Age of Onset    Cancer Maternal Grandmother     Diabetes Maternal Grandfather     High Blood Pressure Paternal Grandmother     High Cholesterol Paternal Grandmother     Cancer Paternal Grandfather     Other (hypothyroid) Mother       No Known Allergies     REVIEW OF SYSTEMS:   Review of Systems   Constitutional: Negative.  Negative for activity change.   HENT: Negative.     Eyes: Negative.    Respiratory: Negative.  Negative for chest tightness and shortness of breath.    Cardiovascular: Negative.  Negative for chest  Katherin, staff at [facility], calls back and reports patient takes 1 tablespoon of Metamucil once a day in the evening. Requests call back to [facility] # [phone].   pain.   Gastrointestinal:  Negative for abdominal distention, abdominal pain, constipation, diarrhea and nausea.   Genitourinary: Negative.  Negative for difficulty urinating, menstrual problem and vaginal discharge.   Musculoskeletal: Negative.    Skin: Negative.    Neurological: Negative.    Psychiatric/Behavioral:  Positive for decreased concentration. Negative for self-injury and suicidal ideas. The patient is nervous/anxious.       Wt Readings from Last 5 Encounters:   09/30/24 255 lb (115.7 kg)   03/03/21 252 lb 6.4 oz (114.5 kg)   12/28/20 244 lb (110.7 kg)   02/07/20 238 lb (108 kg)   12/20/19 236 lb (107 kg)     Body mass index is 41.16 kg/m².      EXAM:   /80 (BP Location: Right arm, Patient Position: Sitting, Cuff Size: large)   Pulse 87   Temp 97.2 °F (36.2 °C) (Temporal)   Ht 5' 6\" (1.676 m)   Wt 255 lb (115.7 kg)   LMP 09/26/2024 (Exact Date)   SpO2 97%   BMI 41.16 kg/m²   Physical Exam  Vitals reviewed.   Constitutional:       Appearance: Normal appearance.   HENT:      Head: Normocephalic and atraumatic.      Right Ear: Tympanic membrane and external ear normal.      Left Ear: Tympanic membrane and external ear normal.      Mouth/Throat:      Mouth: Mucous membranes are moist.      Pharynx: Oropharynx is clear.   Eyes:      Pupils: Pupils are equal, round, and reactive to light.   Cardiovascular:      Rate and Rhythm: Normal rate and regular rhythm.      Pulses: Normal pulses.      Heart sounds: Normal heart sounds.   Pulmonary:      Effort: Pulmonary effort is normal.      Breath sounds: Normal breath sounds.   Chest:   Breasts:     Right: Normal. No mass, nipple discharge or tenderness.      Left: Normal. No mass, nipple discharge or tenderness.   Abdominal:      General: Abdomen is flat. Bowel sounds are normal. There is no distension.      Palpations: Abdomen is soft. There is no mass.      Tenderness: There is no abdominal tenderness. There is no guarding or rebound.      Hernia: No  hernia is present.   Genitourinary:     General: Normal vulva.      Labia:         Right: No rash or lesion.         Left: No rash or lesion.       Urethra: No urethral lesion.      Vagina: Normal. No vaginal discharge, erythema, tenderness or lesions.      Cervix: Cervical bleeding (spotting, finishing LMP) present. No discharge, lesion or erythema.      Uterus: Normal.    Musculoskeletal:         General: Normal range of motion.      Cervical back: Normal range of motion.   Lymphadenopathy:      Upper Body:      Right upper body: No axillary adenopathy.      Left upper body: No axillary adenopathy.   Skin:     General: Skin is warm.   Neurological:      General: No focal deficit present.      Mental Status: She is alert and oriented to person, place, and time.   Psychiatric:         Mood and Affect: Mood normal.         Behavior: Behavior normal.            ASSESSMENT AND PLAN:   (Z76.89) Encounter to establish care  (primary encounter diagnosis)  (Z00.00) Encounter for annual physical exam  Plan: LIPID PANEL [7630] [Q], TSH W REFLEX TO FREE T4        [59181][Q], COMPLETE METABOLIC PANEL [Q],         HGB A1C [496] [Q]        - Immunizations UTD   - tobacco, alcohol, illicit drug use discouraged  - safe sexual practices advised  - Reinforced healthy diet, lifestyle, and exercise.  - Past Medical/Social/Family histories reviewed  - Regular dental visits recommended   - Regular eye exams recommended     Health Maintenance   Topic Date Due    Pap Smear  09/27/2022         (Z12.4) Screening for cervical cancer  Plan: THIN PREP PAP W/ HPV REFLEX [95887][Q]        Pap smear completed today.  Further management pending results.    (Z11.3) Screening examination for STD (sexually transmitted disease)  Plan: HIV-1/HIV-2 ANTIBODIES [99680] [Q], RPR W/REF         TO TITER & CONFIRM [55815][Q], Chlamydia/Gc         Amplification [E]        STD screening completed today.  Further management pending results.  Advised if sexual  practices.    (Z83.49) Family history of hypothyroidism  Plan: THYROID PEROXIDASE (TPO) AB [5081] [Q]        Family history of Hashimoto's.  Labs completed today.  Further management pending results.    (Z68.41) BMI 40.0-44.9, adult (HCC)  Plan: HGB A1C [496] [Q]  Patient working with dietitian.  Working to improve diet and regular physical activity.  Goal 150 minutes of moderate activity per week.  Advise lifestyle modification.  If desires pharmacological management in the future.  Please follow-up.    (F41.9,  F32.A) Anxiety and depression  Plan: OP REFERRAL TO [facility]     Patient with anxiety depression.  Has appointment to establish with therapy today.  Discussed pharmacological management.  Previously tried Lexapro but discontinued due to lethargy.  Discussed alternative SSRIs such as Prozac versus Zoloft.  Will follow-up desires in the future.  Denies suicidal ideations or thoughts self-harm.    (R41.840) Poor concentration  Plan: OP REFERRAL TO [facility]  Patient with complaints of poor concentration, decreased executive functioning.  Interested in neuropsych testing for ADHD.  Referral placed.    Further management pending results.    (E55.9) Vitamin D deficiency  Plan: VITAMIN D, SCREEN [94765][Q]        Labs completed today.  Further management pending results.      Follow up in 1 year or sooner if needed      The patient indicates understanding of these issues and agrees to the plan.  Chaperone offered to the patient prior to examination    This note was prepared using Dragon Medical voice recognition dictation software. As a result errors may occur. When identified these errors have been corrected. While every attempt is made to correct errors during dictation discrepancies may still exist.

## 2025-02-11 NOTE — PROGRESS NOTES
Progress Note    Patient Name: Valentine Vu  Date: 1/29/2021         Service Type: Individual      Session Start Time: 8:08 AM Session End Time: 8:46 AM   (started late due to issues with technology)    Session Length: 38-52 minutes    Session #: 14    Attendees: Client and  at the end of session     Service Modality:  Video Visit:    Telemedicine Visit: The patient's condition can be safely assessed and treated via synchronous audio and visual telemedicine encounter.      Reason for Telemedicine Visit: Services only offered telehealth    Originating Site (Patient Location): Patient's home    Distant Site (Provider Location): Provider Remote Setting    Consent:  The patient/guardian has verbally consented to: the potential risks and benefits of telemedicine (video visit) versus in person care; bill my insurance or make self-payment for services provided; and responsibility for payment of non-covered services.     Patient would like the video invitation sent by: Send to [phone]    Mode of Communication:  Video Conference via [employer]    As the provider I attest to compliance with applicable laws and regulations related to telemedicine.     Treatment Plan Last Reviewed: 12/8/2020  PHQ-9 / ORESTES-7 :  0/1 On 1/29/2021     DATA  Interactive Complexity: No  Crisis: No       Progress Since Last Session (Related to Symptoms / Goals / Homework):   Symptoms: stable     Homework: Did not complete       Episode of Care Goals: Satisfactory progress - MAINTENANCE (Working to maintain change, with risk of relapse); Intervened by continuing to positively reinforce healthy behavior choice      Current / Ongoing Stressors and Concerns:   pandemic and less engagement out of the house    reported client had a brief non-responsive episode when at PCP appointment, similar to those she experienced in the past.  Continued unknown cause.  Suggested to may be anxiety  Viral rash, symptomatic cares as tolerated   related     Treatment Objective(s) Addressed in This Session:   Increase interest, engagement, and pleasure in doing things      Intervention:   completed flowsheets  Explored level of functioning   Participated in reflective listening, used open-ended questions to engage client in conversation  Obtained observations from     ASSESSMENT: Current Emotional / Mental Status (status of significant symptoms):   Risk status (Self / Other harm or suicidal ideation)   Patient denies current fears or concerns for personal safety.   Patient denies current or recent suicidal ideation or behaviors.   Patient denies current or recent homicidal ideation or behaviors.   Patient denies current or recent self injurious behavior or ideation.   Patient denies other safety concerns.   Patient reports there has been no change in risk factors since their last session.     Patient reports there has been no change in protective factors since their last session.     Recommended that patient call 911 or go to the local ED should there be a change in any of these risk factors.     Appearance:   Appropriate    Eye Contact:   Good    Psychomotor Behavior: Normal    Attitude:   Cooperative  Pleasant   Orientation:   All   Speech    Rate / Production: Slow some stutter and word finding difficulty    Volume:  Normal    Mood:    stable   Affect:    Appropriate    Thought Content:  Clear    Thought Form:  Coherent    Insight:    Poor      Medication Review:   No changes to current psychiatric medication(s)     Medication Compliance:   Yes     Changes in Health Issues:   None reported     Chemical Use Review:   Substance Use: Chemical use reviewed, no active concerns identified      Tobacco Use: No current tobacco use.      Diagnosis:  Generalized Anxiety Disorder    Collateral Reports Completed:   Not Applicable    PLAN: (Patient Tasks / Therapist Tasks / Other)   suggested working on supporting client with any future  change in routine.  Therapist encouraged  to join the start of future sessions to share any observations or concerns if client is agreeable.    Arely Whiteside, LICSW 1/29/2021    ______________________________________________________________________    Treatment Plan     Patient's Name: Valentine Vu                        YOB: 1960     Date: 12/8/2020     DSM5 Diagnoses: 300.02 (F41.1) Generalized Anxiety Disorder  Psychosocial / Contextual Factors: lives in a group home, COVID/not working, less time out of the house  WHODAS: 13     Referral / Collaboration:  Referral to another professional/service is not indicated at this time..     Anticipated number of session or this episode of care: 12        MeasurableTreatment Goal(s) related to diagnosis / functional impairment(s)  Goal 1: Patient's anxiety will remit as evidenced by a decrease in GAD7 score by at least 6 points or below a 5, where symptoms occur fewer than half the days for a minimum of 4 weeks.  Client's Goal:  I will know I've met my goal when I am able to relax.       Objective #A (Patient Action)                          Patient will use at least 3 coping skills for anxiety management in the next 8 weeks.  Status: Completed 9/9/2020     Intervention(s)  Therapist will teach coping strategies such as grounding techniques and cognitive restructuring to manage anxiety symptoms.     Objective #B  Patient will identify at least 3 triggers for anxiety.  Status: Completed 9/9/2020      Intervention(s)  Therapist will engage client in discussion to identify cognitions and situations that precede anxiety symptoms.     Objective #C  Patient will use relaxation strategies 1 times per day to reduce the physical symptoms of anxiety.  Status: Continued - Date: 9/9/2020, 12/8/2020      Intervention(s)  Therapist will teach relaxation strategies and address any barriers in engaging in the strategies.    Objective #D  Patient will use at  least 2 coping strategies to decrease anxiety impacting ability to fall asleep.  Status: New - Date 12/8/2020    Intervention(s)  Therapist will teach coping strategies, such as grounding techniques and progressive muscle relaxation.      Patient has reviewed and agreed to the above plan.      Arely Whiteside, [facility]  12/8/2020

## 2025-03-08 ENCOUNTER — HEALTH MAINTENANCE LETTER (OUTPATIENT)
Age: 65
End: 2025-03-08

## 2025-06-22 ENCOUNTER — HEALTH MAINTENANCE LETTER (OUTPATIENT)
Age: 65
End: 2025-06-22

## (undated) DEVICE — KIT ENDO TURNOVER/PROCEDURE W/CLEAN A SCOPE LINERS 103888

## (undated) RX ORDER — FENTANYL CITRATE 50 UG/ML
INJECTION, SOLUTION INTRAMUSCULAR; INTRAVENOUS
Status: DISPENSED
Start: 2019-05-28